# Patient Record
Sex: MALE | Race: WHITE | NOT HISPANIC OR LATINO | Employment: UNEMPLOYED | URBAN - METROPOLITAN AREA
[De-identification: names, ages, dates, MRNs, and addresses within clinical notes are randomized per-mention and may not be internally consistent; named-entity substitution may affect disease eponyms.]

---

## 2017-02-07 ENCOUNTER — OFFICE VISIT (OUTPATIENT)
Dept: FAMILY MEDICINE | Facility: CLINIC | Age: 32
End: 2017-02-07
Payer: COMMERCIAL

## 2017-02-07 VITALS
BODY MASS INDEX: 26.65 KG/M2 | DIASTOLIC BLOOD PRESSURE: 76 MMHG | WEIGHT: 191 LBS | HEART RATE: 68 BPM | SYSTOLIC BLOOD PRESSURE: 143 MMHG

## 2017-02-07 DIAGNOSIS — R25.2 BILATERAL LEG CRAMPS: ICD-10-CM

## 2017-02-07 DIAGNOSIS — G47.62 NOCTURNAL LEG CRAMPS: ICD-10-CM

## 2017-02-07 DIAGNOSIS — G47.00 PERSISTENT DISORDER OF INITIATING OR MAINTAINING SLEEP: Primary | ICD-10-CM

## 2017-02-07 PROCEDURE — 99213 OFFICE O/P EST LOW 20 MIN: CPT | Performed by: FAMILY MEDICINE

## 2017-02-07 RX ORDER — GABAPENTIN 600 MG/1
600 TABLET ORAL
Qty: 90 TABLET | Refills: 1 | Status: SHIPPED | OUTPATIENT
Start: 2017-02-07 | End: 2019-04-08

## 2017-02-07 RX ORDER — TRAZODONE HYDROCHLORIDE 100 MG/1
50-100 TABLET ORAL
Qty: 90 TABLET | Refills: 1 | Status: SHIPPED | OUTPATIENT
Start: 2017-02-07 | End: 2019-04-08

## 2017-02-07 NOTE — PROGRESS NOTES
SUBJECTIVE:                                                    Germain Iraheta is a 31 year old male who presents to clinic today for the following health issues:    Chief Complaint   Patient presents with     Medication Request     refill gabapentin and discuss getting back on ambien for sleep.      Germain Iraheta is a 31 year old male who  Has not been seen in this clinic since August 2015, though he did have some ED visits after that. He was being treated at that time for insomnia and muscle cramps with gabapentin and Ambien.  He got into some legal trouble and was able to avoid incarceration by going through the program at [facility], where he spent some months. This program did not allow the Ambien and he was placed on trazodone, which he states is working as well or better. The gabapentin has been adjust to 600 mg at bedtime.    He has a new job now that has him sitting more, so we discussed the need for muscle stretching and activity.    Since the trazodone is working for sleep, he is not interested in seeing a sleep physician.    He denies use of any illicit substances, admits to 5 alcoholic beverages a week, quit smoking 10 years ago.    OBJECTIVE: /76 mmHg  Pulse 68  Wt 191 lb (86.637 kg)    He is pleasant, calm, gives a clear history, states he is determined not to make the same mistake that got him into trouble, has a fiancee with children and feels responsible. 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    ASSESSMENT: /PLAN:      ICD-10-CM    1. Persistent disorder of initiating or maintaining sleep G47.00 traZODone (DESYREL) 100 MG tablet   2. Bilateral leg cramps R25.2 gabapentin (NEURONTIN) 600 MG tablet   3. Nocturnal leg cramps G47.62 gabapentin (NEURONTIN) 600 MG tablet     Current meds renewed for six months.    Maryellen Carrizales md

## 2017-02-07 NOTE — MR AVS SNAPSHOT
"              After Visit Summary   2017    Germain Iraheta    MRN: [id number]           Patient Information     Date Of Birth          1985        Visit Information        Provider Department      2017 2:40 PM Maryellen Carrizales MD [facility]        Today's Diagnoses     Persistent disorder of initiating or maintaining sleep    -  1     Bilateral leg cramps         Nocturnal leg cramps            Follow-ups after your visit        Who to contact     If you have questions or need follow up information about today's clinic visit or your schedule please contact [facility] directly at [phone].  Normal or non-critical lab and imaging results will be communicated to you by Mitralign[employer], letter or phone within 4 business days after the clinic has received the results. If you do not hear from us within 7 days, please contact the clinic through Mitralignhart or phone. If you have a critical or abnormal lab result, we will notify you by phone as soon as possible.  Submit refill requests through [employer] or call your pharmacy and they will forward the refill request to us. Please allow 3 business days for your refill to be completed.          Additional Information About Your Visit        MyChart Information     [employer] lets you send messages to your doctor, view your test results, renew your prescriptions, schedule appointments and more. To sign up, go to [email or web address] . Click on \"Log in\" on the left side of the screen, which will take you to the Welcome page. Then click on \"Sign up Now\" on the right side of the page.     You will be asked to enter the access code listed below, as well as some personal information. Please follow the directions to create your username and password.     Your access code is: [email or web address]  Expires: 2017  3:39 PM     Your access code will  in 90 days. If you need help or a new code, please call your [facility] or " [phone].        Care EveryWhere ID     This is your Care EveryWhere ID. This could be used by other organizations to access your [facility] medical records  [id number]        Your Vitals Were     Pulse                   68            Blood Pressure from Last 3 Encounters:   02/07/17 143/76   11/15/15 140/80   10/07/15 152/98    Weight from Last 3 Encounters:   02/07/17 191 lb (86.637 kg)   11/15/15 193 lb (87.544 kg)   08/24/15 198 lb (89.812 kg)              Today, you had the following     No orders found for display         Today's Medication Changes          These changes are accurate as of: 2/7/17  3:39 PM.  If you have any questions, ask your nurse or doctor.               Start taking these medicines.        Dose/Directions    gabapentin 600 MG tablet   Commonly known as:  NEURONTIN   Used for:  Nocturnal leg cramps, Bilateral leg cramps   Replaces:  gabapentin 400 MG capsule   Started by:  Maryellen Carrizales MD        Dose:  600 mg   Take 1 tablet (600 mg) by mouth nightly as needed   Quantity:  90 tablet   Refills:  1       traZODone 100 MG tablet   Commonly known as:  DESYREL   Used for:  Persistent disorder of initiating or maintaining sleep   Started by:  Maryellen Carrizales MD        Dose:   mg   Take 0.5-1 tablets ( mg) by mouth nightly as needed for sleep   Quantity:  90 tablet   Refills:  1         Stop taking these medicines if you haven't already. Please contact your care team if you have questions.     AMBIEN 5 MG tablet   Generic drug:  zolpidem   Stopped by:  Maryellen Carrizales MD           cyclobenzaprine 10 MG tablet   Commonly known as:  FLEXERIL   Stopped by:  Maryellen Carrizales MD           gabapentin 400 MG capsule   Commonly known as:  NEURONTIN   Replaced by:  gabapentin 600 MG tablet   Stopped by:  Maryellen Carrizales MD                Where to get your medicines      These medications were sent to [facility] – [place] - [address]  [address] [facility] [address]     Phone:  [phone]    - gabapentin 600 MG tablet  - traZODone 100 MG tablet             Primary Care Provider Office Phone # Fax #    Maryellen Naty Carrizales [phone]       [facility] [address]  [facility] [address]        Thank you!     Thank you for choosing [facility]  for your care. Our goal is always to provide you with excellent care. Hearing back from our patients is one way we can continue to improve our services. Please take a few minutes to complete the written survey that you may receive in the mail after your visit with us. Thank you!             Your Updated Medication List - Protect others around you: Learn how to safely use, store and throw away your medicines at www.disposemymeds.org.          This list is accurate as of: 2/7/17  3:39 PM.  Always use your most recent med list.                   Brand Name Dispense Instructions for use    gabapentin 600 MG tablet    NEURONTIN    90 tablet    Take 1 tablet (600 mg) by mouth nightly as needed       tadalafil 20 MG tablet    CIALIS    6 tablet    Take 1 tablet by mouth daily as needed for erectile dysfunction.       traZODone 100 MG tablet    DESYREL    90 tablet    Take 0.5-1 tablets ( mg) by mouth nightly as needed for sleep

## 2017-02-07 NOTE — NURSING NOTE
"Chief Complaint   Patient presents with     Medication Request     refill gabapentin and discuss getting back on ambien for sleep.        Initial /76 mmHg  Pulse 68  Wt 191 lb (86.637 kg) Estimated body mass index is 26.65 kg/(m^2) as calculated from the following:    Height as of 11/15/15: 5' 10.98\" (1.803 m).    Weight as of this encounter: 191 lb (86.637 kg).  Medication Reconciliation: complete   Shilpi Tamez CMA    "

## 2017-06-05 ENCOUNTER — HOSPITAL ENCOUNTER (EMERGENCY)
Facility: CLINIC | Age: 32
Discharge: LEFT BEFORE DISCHARGE | End: 2017-06-05
Admitting: EMERGENCY MEDICINE
Payer: COMMERCIAL

## 2017-06-05 ENCOUNTER — HOSPITAL ENCOUNTER (EMERGENCY)
Facility: CLINIC | Age: 32
Discharge: HOME | End: 2017-06-05
Attending: PHYSICIAN ASSISTANT | Admitting: PHYSICIAN ASSISTANT
Payer: COMMERCIAL

## 2017-06-05 ENCOUNTER — APPOINTMENT (OUTPATIENT)
Dept: GENERAL RADIOLOGY | Facility: CLINIC | Age: 32
End: 2017-06-05
Attending: PHYSICIAN ASSISTANT
Payer: COMMERCIAL

## 2017-06-05 VITALS
SYSTOLIC BLOOD PRESSURE: 133 MMHG | OXYGEN SATURATION: 97 % | HEIGHT: 71 IN | BODY MASS INDEX: 28.7 KG/M2 | RESPIRATION RATE: 18 BRPM | TEMPERATURE: 98 F | HEART RATE: 68 BPM | DIASTOLIC BLOOD PRESSURE: 86 MMHG | WEIGHT: 205 LBS

## 2017-06-05 DIAGNOSIS — M79.672 PAIN OF LEFT HEEL: ICD-10-CM

## 2017-06-05 PROCEDURE — 99213 OFFICE O/P EST LOW 20 MIN: CPT | Performed by: PHYSICIAN ASSISTANT

## 2017-06-05 PROCEDURE — 99213 OFFICE O/P EST LOW 20 MIN: CPT

## 2017-06-05 PROCEDURE — 73650 X-RAY EXAM OF HEEL: CPT | Mod: LT

## 2017-06-05 PROCEDURE — 73630 X-RAY EXAM OF FOOT: CPT | Mod: LT

## 2017-06-05 RX ORDER — ROPINIROLE 2 MG/1
2 TABLET, FILM COATED, EXTENDED RELEASE ORAL AT BEDTIME
COMMUNITY
End: 2019-04-08 | Stop reason: ALTCHOICE

## 2017-06-05 NOTE — ED AVS SNAPSHOT
[facility] Emergency Department    [address] [address]    Phone:  [phone]    Fax:  [phone]                                       Germain Iraheta   MRN: [id number]    Department:  [facility] Emergency Department   Date of Visit:  6/5/2017           Patient Information     Date Of Birth          1985        Your diagnoses for this visit were:     Pain of left heel        You were seen by Aidee Lopez PA-C.      Follow-up Information     Follow up with Rufus Mckinney DPM In 1 week.    Specialty:  Podiatry    Why:  as needed if symptoms worsen    Contact information:    [facility]  [address] [address] [phone]          Follow up with Javier Moser DPM In 1 week.    Specialty:  Podiatry    Why:  As needed, If symptoms worsen    Contact information:    [facility]  [address] [facility] [address] [phone]          Discharge Instructions         Understanding Heel Pain  Your heel is the back part of your foot. A band of tissue called the plantar fascia connects the heel bone to the bones in the ball of your foot. Nerves run from the heel up the inside of your ankle and into your leg. When you feel pain in the bottom of your heel, the plantar fascia may be inflamed. Overuse, Achilles tightness, or excess body weight can cause the tissue to tear or pull away from the bone. Sometimes the inflamed plantar fascia also irritates a nerve, causing more pain.    What causes heel pain?  Wearing shoes with poor cushioning can irritate the tissue in your heel (plantar fascia). Being overweight or standing for long periods can also irritate the tissue. Running, walking, tennis, and other sports that put stress on the heels can cause tiny tears in the tissue. If your lower leg muscles are tight, this is more likely to occur. A tight Achilles tendon will also contribute to heel pain.  Symptoms  You may  feel pain on the bottom or on the inside edge of your heel. The pain may be sharp when you get out of bed or when you stand up after sitting for a while. You may feel a dull ache in your heel after you ve been standing for a long time on a hard surface. Running can also cause a dull ache.  Preventing future problems  To prevent future heel pain, wear shoes with well-cushioned heels. And do exercises prescribed by your healthcare provider to stretch the plantar fascia and the muscles in the lower leg.     0294-4258 [employer]. [address] [place] [address]. All rights reserved. This information is not intended as a substitute for professional medical care. Always follow your healthcare professional's instructions.          24 Hour Appointment Hotline       To make an appointment at any [facility]3], call [phone] ([phone]). If you don't have a family doctor or clinic, we will help you find one. [facility] clinics are conveniently located to serve the needs of you and your family.          ED Discharge Orders     Alum Crutches: Adult       Use gait belt during crutch training.                     Review of your medicines      Our records show that you are taking the medicines listed below. If these are incorrect, please call your family doctor or clinic.        Dose / Directions Last dose taken    gabapentin 600 MG tablet   Commonly known as:  NEURONTIN   Dose:  600 mg   Quantity:  90 tablet        Take 1 tablet (600 mg) by mouth nightly as needed   Refills:  1        rOPINIRole HCl 2 MG Tb24 tablet   Commonly known as:  REQUIP   Dose:  2 mg        Take 2 mg by mouth At Bedtime   Refills:  0        tadalafil 20 MG tablet   Commonly known as:  CIALIS   Dose:  20 mg   Quantity:  6 tablet        Take 1 tablet by mouth daily as needed for erectile dysfunction.   Refills:  12        traZODone 100 MG tablet   Commonly known as:  DESYREL   Dose:   mg   Quantity:  90 tablet      "   Take 0.5-1 tablets ( mg) by mouth nightly as needed for sleep   Refills:  1                Procedures and tests performed during your visit     Foot XR, G/E 3 views, left    XR Calcaneus Left G/E 2 Views      Orders Needing Specimen Collection     None      Pending Results     No orders found from 6/3/2017 to 6/6/2017.            Pending Culture Results     No orders found from 6/3/2017 to 6/6/2017.            Pending Results Instructions     If you had any lab results that were not finalized at the time of your Discharge, you can call the ED Lab Result RN at [phone]. You will be contacted by this team for any positive Lab results or changes in treatment. The nurses are available 7 days a week from 10A to 6:30P.  You can leave a message 24 hours per day and they will return your call.        Test Results From Your Hospital Stay        6/5/2017  6:21 PM      Narrative     XR FOOT LT G/E 3 VW 6/5/2017 6:08 PM    HISTORY: Pain.    COMPARISON: None.        Impression     IMPRESSION: No evidence of acute fracture or malalignment.    WILLIAM BUSH MD         6/5/2017  6:14 PM      Narrative     XR CALCANEUS LT G/E 2 VW 6/5/2017 6:08 PM    HISTORY: Pain.    COMPARISON: None.        Impression     IMPRESSION: No evidence of acute fracture.    WILLIAM BUSH MD                Thank you for choosing [facility]       Thank you for choosing [facility] for your care. Our goal is always to provide you with excellent care. Hearing back from our patients is one way we can continue to improve our services. Please take a few minutes to complete the written survey that you may receive in the mail after you visit with us. Thank you!        [employer] Information     [employer] lets you send messages to your doctor, view your test results, renew your prescriptions, schedule appointments and more. To sign up, go to [email or web address] . Click on \"Log in\" on the left side of the screen, which will take you to the Welcome page. " "Then click on \"Sign up Now\" on the right side of the page.     You will be asked to enter the access code listed below, as well as some personal information. Please follow the directions to create your username and password.     Your access code is: [email or web address]  Expires: 9/3/2017  6:28 PM     Your access code will  in 90 days. If you need help or a new code, please call your [facility] clinic or [phone].        Care EveryWhere ID     This is your Care EveryWhere ID. This could be used by other organizations to access your [facility] medical records  [id number]        After Visit Summary       This is your record. Keep this with you and show to your community pharmacist(s) and doctor(s) at your next visit.                  "

## 2017-06-05 NOTE — DISCHARGE INSTRUCTIONS
Understanding Heel Pain  Your heel is the back part of your foot. A band of tissue called the plantar fascia connects the heel bone to the bones in the ball of your foot. Nerves run from the heel up the inside of your ankle and into your leg. When you feel pain in the bottom of your heel, the plantar fascia may be inflamed. Overuse, Achilles tightness, or excess body weight can cause the tissue to tear or pull away from the bone. Sometimes the inflamed plantar fascia also irritates a nerve, causing more pain.    What causes heel pain?  Wearing shoes with poor cushioning can irritate the tissue in your heel (plantar fascia). Being overweight or standing for long periods can also irritate the tissue. Running, walking, tennis, and other sports that put stress on the heels can cause tiny tears in the tissue. If your lower leg muscles are tight, this is more likely to occur. A tight Achilles tendon will also contribute to heel pain.  Symptoms  You may feel pain on the bottom or on the inside edge of your heel. The pain may be sharp when you get out of bed or when you stand up after sitting for a while. You may feel a dull ache in your heel after you ve been standing for a long time on a hard surface. Running can also cause a dull ache.  Preventing future problems  To prevent future heel pain, wear shoes with well-cushioned heels. And do exercises prescribed by your healthcare provider to stretch the plantar fascia and the muscles in the lower leg.     4589-5335 [employer]. [address] [place] [address], [place] [address]. All rights reserved. This information is not intended as a substitute for professional medical care. Always follow your healthcare professional's instructions.

## 2017-06-05 NOTE — ED PROVIDER NOTES
History     Chief Complaint   Patient presents with     Ankle Pain     pt jumping from rock to rock while swimming yesterday and landed on L ankle.  c/o pain     HPI  Germain Iraheta is a 31 year old male who resents to the urgent care with concerns over left heel pain after sustaining injury yesterday.  Patient states that he was playing at [employer] and jumped from rock to rock.  He states that he jumped from a height of the 4-5 feet landing directly on his left heel.  He had acute onset of pain.  He additionally complains of ecchymosis, swelling.  He denies any distal numbness or paresthesias.  He states he has been unable to bear weight on the heel due to discomfort.  He has attempted to treat with ice and ibuprofen without relief.  He denies any any history of frequent recurrent heel pain previously.      History reviewed. No pertinent past medical history.  Current Outpatient Prescriptions   Medication Sig Dispense Refill     gabapentin (NEURONTIN) 600 MG tablet Take 1 tablet (600 mg) by mouth nightly as needed 90 tablet 1     rOPINIRole HCl (REQUIP) 2 MG TB24 tablet Take 2 mg by mouth At Bedtime       traZODone (DESYREL) 100 MG tablet Take 0.5-1 tablets ( mg) by mouth nightly as needed for sleep 90 tablet 1     tadalafil (CIALIS) 20 MG tablet Take 1 tablet by mouth daily as needed for erectile dysfunction. 6 tablet 12     Social History   Substance Use Topics     Smoking status: Former Smoker     Smokeless tobacco: Never Used      Comment: quit 2007     Alcohol use 0.0 oz/week     0 Standard drinks or equivalent per week      Comment: 5 drinks a week     I have reviewed the Medications, Allergies, Past Medical and Surgical History, and Social History in the Epic system.    Review of Systems  CONSTITUTIONAL:NEGATIVE for fever, chills, change in weight  INTEGUMENTARY/SKIN: POSITIVE for ecchymosis NEGATIVE for lacerations, abrasions or rashes   RESP:NEGATIVE for significant cough or  "SOB  MUSCULOSKELETAL: POSITIVE  for left heel pain and swelling and NEGATIVE for other significant arthralgias or myalgias   NEURO: NEGATIVE for numbness or paresthesias   Physical Exam   /86  Pulse 68  Temp 98  F (36.7  C) (Temporal)  Resp 18  Ht 1.803 m (5' 11\")  Wt 93 kg (205 lb)  SpO2 97%  BMI 28.59 kg/m2  Physical Exam   Constitutional: He is oriented to person, place, and time. He appears well-developed and well-nourished. He appears distressed (mild patient appars in pain).   Cardiovascular:   Pulses:       Dorsalis pedis pulses are 2+ on the left side.        Posterior tibial pulses are 2+ on the left side.   Musculoskeletal:        Left ankle: He exhibits decreased range of motion (actively with dorsiflexion due to pain). He exhibits no swelling and no ecchymosis. No tenderness. Achilles tendon exhibits no defect and normal Osullivan's test results.        Left lower leg: Normal.        Left foot: There is tenderness, bony tenderness (left calcaneus, posterior heel) and swelling. There is normal range of motion, normal capillary refill, no crepitus, no deformity and no laceration.        Feet:    Neurological: He is alert and oriented to person, place, and time. No sensory deficit.   Skin: Skin is warm and dry. Ecchymosis noted. No abrasion, no laceration and no rash noted. No erythema.       ED Course     ED Course     Procedures        Critical Care time:  none            Results for orders placed or performed during the hospital encounter of 06/05/17   Foot XR, G/E 3 views, left    Narrative    XR FOOT LT G/E 3 VW 6/5/2017 6:08 PM    HISTORY: Pain.    COMPARISON: None.      Impression    IMPRESSION: No evidence of acute fracture or malalignment.    WILLIAM BUSH MD   XR Calcaneus Left G/E 2 Views    Narrative    XR CALCANEUS LT G/E 2 VW 6/5/2017 6:08 PM    HISTORY: Pain.    COMPARISON: None.      Impression    IMPRESSION: No evidence of acute fracture.    WILLIAM BUSH MD     Labs Ordered and " Resulted from Time of ED Arrival Up to the Time of Departure from the ED - No data to display    Assessments & Plan (with Medical Decision Making)     I have reviewed the nursing notes.    I have reviewed the findings, diagnosis, plan and need for follow up with the patient.  Discharge Medication List as of 6/5/2017  6:28 PM        Final diagnoses:   Pain of left heel     31-year-old male presents to urgent care with concerns of her left heel pain after injury yesterday when he jumped from a height of 3-5 feet landing directly on his left heel with immediate onsets of pain.  He had stable vital signs upon arrival.  Physical exam findings as described above. He did not have any defect of his achilles tendon. His distal neurovascular status was intact.  As part of evaluation patient had x-ray of his left foot and calcaneus which are negative for acute fracture, dislocation.  Despite negative x-rays, I did recommend splinting for comfort given patient's mechanism of injury and physical exam findings.  He declines, however he did agree to use crutches for the next several days.  He is instructed to follow up with podiatry if no improvement of pain for the next 5-7 days.  Consider repeat imaging at that time.  Worrisome reasons to return to ER/UC sooner discussed.      Disclaimer: This note consists of symbols derived from keyboarding, dictation, and/or voice recognition software. As a result, there may be errors in the script that have gone undetected.  Please consider this when interpreting information found in the chart.     6/5/2017   [facility] EMERGENCY DEPARTMENT     Aidee Lopez PA-C  06/06/17 1246

## 2017-06-05 NOTE — ED AVS SNAPSHOT
[facility] Emergency Department    [address] [address]    Phone:  [phone]    Fax:  [phone]                                       Germain Iraheta   MRN: [id number]    Department:  [facility] Emergency Department   Date of Visit:  6/5/2017           After Visit Summary Signature Page     I have received my discharge instructions, and my questions have been answered. I have discussed any challenges I see with this plan with the nurse or doctor.    ..........................................................................................................................................  Patient/Patient Representative Signature      ..........................................................................................................................................  Patient Representative Print Name and Relationship to Patient    ..................................................               ................................................  Date                                            Time    ..........................................................................................................................................  Reviewed by Signature/Title    ...................................................              ..............................................  Date                                                            Time

## 2017-12-18 ENCOUNTER — TELEPHONE (OUTPATIENT)
Dept: FAMILY MEDICINE | Facility: CLINIC | Age: 32
End: 2017-12-18

## 2017-12-18 NOTE — TELEPHONE ENCOUNTER
Pt with bad cough which is causing back pain,pulled muscle every time he coughs.  Hard for pt to talk with out coughing.  Deep breath is painful.  No clinic appts.  Recommended Urgent Care and pt agreed.Manuel

## 2017-12-18 NOTE — TELEPHONE ENCOUNTER
Reason for call:  Patient reporting a symptom    Symptom or request: Pt has had a bad cough and congestion X 2 weeks and he now has back pain from coughing so hard.  Pt wants to know if can get in today for an appt.      Duration (how long have symptoms been present): 2 weeks    Have you been treated for this before? No    Additional comments:     Phone Number patient can be reached at:  Cell number on file:    Telephone Information:   Mobile [phone]       Best Time:  any    Can we leave a detailed message on this number:  YES    Call taken on 12/18/2017 at 11:04 AM by Kaitlynn Langford

## 2018-01-06 ENCOUNTER — HOSPITAL ENCOUNTER (EMERGENCY)
Facility: CLINIC | Age: 33
Discharge: HOME | End: 2018-01-06
Attending: PHYSICIAN ASSISTANT | Admitting: PHYSICIAN ASSISTANT

## 2018-01-06 VITALS
WEIGHT: 215 LBS | TEMPERATURE: 97.8 F | HEART RATE: 77 BPM | RESPIRATION RATE: 16 BRPM | BODY MASS INDEX: 29.99 KG/M2 | SYSTOLIC BLOOD PRESSURE: 136 MMHG | DIASTOLIC BLOOD PRESSURE: 88 MMHG | OXYGEN SATURATION: 99 %

## 2018-01-06 DIAGNOSIS — R07.0 THROAT PAIN: ICD-10-CM

## 2018-01-06 LAB
INTERNAL QC OK POCT: YES
S PYO AG THROAT QL IA.RAPID: NEGATIVE

## 2018-01-06 PROCEDURE — 87081 CULTURE SCREEN ONLY: CPT | Performed by: PHYSICIAN ASSISTANT

## 2018-01-06 PROCEDURE — 99213 OFFICE O/P EST LOW 20 MIN: CPT | Performed by: PHYSICIAN ASSISTANT

## 2018-01-06 PROCEDURE — G0463 HOSPITAL OUTPT CLINIC VISIT: HCPCS

## 2018-01-06 PROCEDURE — 87880 STREP A ASSAY W/OPTIC: CPT | Performed by: PHYSICIAN ASSISTANT

## 2018-01-06 RX ORDER — DEXAMETHASONE 4 MG/1
8 TABLET ORAL ONCE
Qty: 2 TABLET | Refills: 0 | Status: SHIPPED | OUTPATIENT
Start: 2018-01-06 | End: 2019-07-03

## 2018-01-06 NOTE — DISCHARGE INSTRUCTIONS
Use Medication as directed; no antibiotics indicated at this time; will wait for throat culture.     Patient advised to call for any lab results (if obtained during visit) within 2-3 days.     Symptomatic treatment with fluids, rest, salt water gargles, and cool humidifier.  May use acetaminophen, ibuprofen prn.    Return to care if any worsening symptoms or if not improving (Kinney may need to be ruled out if symptoms fail to improve).    Patient to go to Emergency Room if drooling, change in voice, difficulty swallowing or talking, or persistent fevers occur.      Patient voiced understanding of instructions given.

## 2018-01-06 NOTE — ED PROVIDER NOTES
History     Chief Complaint   Patient presents with     Pharyngitis     HPI    Germain Iraheta  is a 32 year old male who is here today because of: Sore Throat for the past 3 days. URI symptoms on and off for the past month.   The patient has had symptoms of cough, sore throat, nasal congestion/runny nose and headache.   Onset of symptoms was 3 days ago. Course of illness is same.  Patient denies exposure to illness at home or work/school.   Patient denies fever, earache, vomiting and diarrhea  Treatment measures tried include ibuprofen.    Problem list, Medication list, Allergies, and Medical/Social/Surgical histories reviewed in [facility] and updated as appropriate.    Problem List:    Patient Active Problem List    Diagnosis Date Noted     Impotence of organic origin 06/05/2013     Priority: Medium     CARDIOVASCULAR SCREENING; LDL GOAL LESS THAN 160 06/04/2013     Priority: Medium        Past Medical History:    No past medical history on file.    Past Surgical History:    No past surgical history on file.    Family History:    Family History   Problem Relation Age of Onset     DIABETES Father      CANCER Maternal Grandfather      lung     Hypertension No family hx of        Social History:  Marital Status:  Single [1]  Social History   Substance Use Topics     Smoking status: Former Smoker     Smokeless tobacco: Never Used      Comment: quit 2007     Alcohol use 0.0 oz/week     0 Standard drinks or equivalent per week      Comment: 5 drinks a week        Medications:      dexamethasone (DECADRON) 4 MG tablet   rOPINIRole HCl (REQUIP) 2 MG TB24 tablet   traZODone (DESYREL) 100 MG tablet   gabapentin (NEURONTIN) 600 MG tablet   tadalafil (CIALIS) 20 MG tablet         Review of Systems    Physical Exam   BP: 136/88  Pulse: 77  Temp: 97.8  F (36.6  C)  Resp: 16  Weight: 97.5 kg (215 lb)  SpO2: 99 %      Physical Exam     /88  Pulse 77  Temp 97.8  F (36.6  C) (Temporal)  Resp 16  Wt 97.5 kg (215 lb)  SpO2  99%  BMI 29.99 kg/m2  General: healthy, alert with no acute distress, and non toxic in appearance  Eyes - conjunctivae clear.  Ears - External ears normal. Canals clear. TM's normal.  Nose/Sinuses - Nares normal.Mucosa normal. No drainage or sinus tenderness.  Oropharynx - Lips, mucosa, and tongue normal. Positive findings: oropharyngeal erythema, tonsillar hypertrophy with no exudates present.   Neck - Neck supple; Positive findings: moderate anterior cervical nodes, no meningeal signs.   Lungs - Lungs clear; no wheezing or rales.  Heart - regular rate and rhythm. No murmurs, rub.  Abdomen: Abdomen soft, non-tender. BS normal. No masses, organomegaly  SKIN: no suspicious lesions or rashes    Labs:  Rapid Strep test is negative; await throat culture results.  Results for orders placed or performed during the hospital encounter of 01/06/18 (from the past 24 hour(s))   Rapid strep group A screen POCT   Result Value Ref Range    Rapid Strep A Screen NEGATIVE neg    Internal QC OK Yes        ED Course     ED Course     Procedures              Critical Care time:  none               Labs Ordered and Resulted from Time of ED Arrival Up to the Time of Departure from the ED   RAPID STREP GROUP A SCREEN POCT   RAPID STREP GROUP A SCREEN POCT   BETA STREP GROUP A CULTURE       Assessments & Plan (with Medical Decision Making)     I have reviewed the nursing notes.    I have reviewed the findings, diagnosis, plan and need for follow up with the patient.    Throat culture pending.        New Prescriptions    DEXAMETHASONE (DECADRON) 4 MG TABLET    Take 2 tablets (8 mg) by mouth once for 1 dose       Final diagnoses:   Throat pain       1/6/2018   [facility] EMERGENCY DEPARTMENT     Emi Villalobos PA-C  01/06/18 [id number]

## 2018-01-06 NOTE — ED AVS SNAPSHOT
[facility] Emergency Department    [address] [address]    Phone:  [phone]    Fax:  [phone]                                       Germain Iraheta   MRN: [id number]    Department:  [facility] Emergency Department   Date of Visit:  1/6/2018           After Visit Summary Signature Page     I have received my discharge instructions, and my questions have been answered. I have discussed any challenges I see with this plan with the nurse or doctor.    ..........................................................................................................................................  Patient/Patient Representative Signature      ..........................................................................................................................................  Patient Representative Print Name and Relationship to Patient    ..................................................               ................................................  Date                                            Time    ..........................................................................................................................................  Reviewed by Signature/Title    ...................................................              ..............................................  Date                                                            Time

## 2018-01-06 NOTE — ED AVS SNAPSHOT
[facility] Emergency Department    [address] [address]    Phone:  [phone]    Fax:  [phone]                                       Germain Iraheta   MRN: [id number]    Department:  [facility] Emergency Department   Date of Visit:  1/6/2018           Patient Information     Date Of Birth          1985        Your diagnoses for this visit were:     Throat pain        You were seen by Emi Villalobos PA-C.      Follow-up Information     Follow up with Maryellen Carrizales MD.    Specialty:  Family Practice    Why:  As needed, If symptoms worsen        Discharge Instructions       Use Medication as directed; no antibiotics indicated at this time; will wait for throat culture.     Patient advised to call for any lab results (if obtained during visit) within 2-3 days.     Symptomatic treatment with fluids, rest, salt water gargles, and cool humidifier.  May use acetaminophen, ibuprofen prn.    Return to care if any worsening symptoms or if not improving ([place] may need to be ruled out if symptoms fail to improve).    Patient to go to Emergency Room if drooling, change in voice, difficulty swallowing or talking, or persistent fevers occur.      Patient voiced understanding of instructions given.            24 Hour Appointment Hotline       To make an appointment at any [facility], call [phone] ([phone]). If you don't have a family doctor or clinic, we will help you find one. [facility] clinics are conveniently located to serve the needs of you and your family.             Review of your medicines      START taking        Dose / Directions Last dose taken    dexamethasone 4 MG tablet   Commonly known as:  DECADRON   Dose:  8 mg   Quantity:  2 tablet        Take 2 tablets (8 mg) by mouth once for 1 dose   Refills:  0          Our records show that you are taking the medicines listed below. If these are incorrect, please call your family doctor or clinic.         Dose / Directions Last dose taken    gabapentin 600 MG tablet   Commonly known as:  NEURONTIN   Dose:  600 mg   Quantity:  90 tablet        Take 1 tablet (600 mg) by mouth nightly as needed   Refills:  1        rOPINIRole HCl 2 MG Tb24 tablet   Commonly known as:  REQUIP   Dose:  2 mg        Take 2 mg by mouth At Bedtime   Refills:  0        tadalafil 20 MG tablet   Commonly known as:  CIALIS   Dose:  20 mg   Quantity:  6 tablet        Take 1 tablet by mouth daily as needed for erectile dysfunction.   Refills:  12        traZODone 100 MG tablet   Commonly known as:  DESYREL   Dose:   mg   Quantity:  90 tablet        Take 0.5-1 tablets ( mg) by mouth nightly as needed for sleep   Refills:  1                Prescriptions were sent or printed at these locations (1 Prescription)                   [facility] [address] [place] [address]   [address] [address]    Telephone:  [phone]   Fax:  [phone]   Hours:                  E-Prescribed (1 of 1)         dexamethasone (DECADRON) 4 MG tablet                Procedures and tests performed during your visit     Beta strep group A r/o culture    Rapid strep group A screen POCT      Orders Needing Specimen Collection     None      Pending Results     No orders found from 1/4/2018 to 1/7/2018.            Pending Culture Results     No orders found from 1/4/2018 to 1/7/2018.            Pending Results Instructions     If you had any lab results that were not finalized at the time of your Discharge, you can call the ED Lab Result RN at [phone]. You will be contacted by this team for any positive Lab results or changes in treatment. The nurses are available 7 days a week from 10A to 6:30P.  You can leave a message 24 hours per day and they will return your call.        Test Results From Your Hospital Stay        1/6/2018  5:22 PM      Component Results     Component Value Ref Range & Units Status    Rapid Strep A  "Screen NEGATIVE neg Final    Internal QC OK Yes  Final                Thank you for choosing [facility]       Thank you for choosing [facility] for your care. Our goal is always to provide you with excellent care. Hearing back from our patients is one way we can continue to improve our services. Please take a few minutes to complete the written survey that you may receive in the mail after you visit with us. Thank you!        [employer] Information     [email or web address] lets you send messages to your doctor, view your test results, renew your prescriptions, schedule appointments and more. To sign up, go to [email or web address] . Click on \"Log in\" on the left side of the screen, which will take you to the Welcome page. Then click on \"Sign up Now\" on the right side of the page.     You will be asked to enter the access code listed below, as well as some personal information. Please follow the directions to create your username and password.     Your access code is: [email or web address]  Expires: 2018  5:31 PM     Your access code will  in 90 days. If you need help or a new code, please call your [facility] clinic or [phone].        Care EveryWhere ID     This is your Care EveryWhere ID. This could be used by other organizations to access your [facility] medical records  [id number]        Equal Access to Services     JOSE MARIA VALDEZ : Khadra Martini, waaxda luqadaha, qaybta kaalmada adesepidehyada, deborah hernandez. So [facility] [phone].    ATENCIÓN: Si habla español, tiene a berger disposición servicios gratuitos de asistencia lingüística. Llame al [phone].    We comply with applicable federal civil rights laws and [place] laws. We do not discriminate on the basis of race, color, national origin, age, disability, sex, sexual orientation, or gender identity.            After Visit Summary       This is your record. Keep this with you and show to your community pharmacist(s) and doctor(s) at " your next visit.

## 2018-01-08 LAB
BACTERIA SPEC CULT: NORMAL
Lab: NORMAL
SPECIMEN SOURCE: NORMAL

## 2018-12-03 ENCOUNTER — OFFICE VISIT (OUTPATIENT)
Dept: FAMILY MEDICINE | Facility: CLINIC | Age: 33
End: 2018-12-03

## 2018-12-03 VITALS
HEIGHT: 71 IN | HEART RATE: 70 BPM | TEMPERATURE: 97.4 F | WEIGHT: 215 LBS | SYSTOLIC BLOOD PRESSURE: 134 MMHG | DIASTOLIC BLOOD PRESSURE: 82 MMHG | RESPIRATION RATE: 16 BRPM | OXYGEN SATURATION: 96 % | BODY MASS INDEX: 30.1 KG/M2

## 2018-12-03 DIAGNOSIS — M62.830 SPASM OF MUSCLE OF LOWER BACK: Primary | ICD-10-CM

## 2018-12-03 DIAGNOSIS — S39.012A STRAIN OF LUMBAR REGION, INITIAL ENCOUNTER: ICD-10-CM

## 2018-12-03 PROCEDURE — 99214 OFFICE O/P EST MOD 30 MIN: CPT | Performed by: FAMILY MEDICINE

## 2018-12-03 RX ORDER — CYCLOBENZAPRINE HCL 10 MG
10 TABLET ORAL 3 TIMES DAILY PRN
Qty: 90 TABLET | Refills: 1 | Status: SHIPPED | OUTPATIENT
Start: 2018-12-03 | End: 2019-04-08

## 2018-12-03 RX ORDER — ASPIRIN 81 MG
TABLET,CHEWABLE ORAL 4 TIMES DAILY
Qty: 30 G | Refills: 11 | Status: SHIPPED | OUTPATIENT
Start: 2018-12-03 | End: 2019-04-08

## 2018-12-03 NOTE — NURSING NOTE
"Initial /82  Pulse 70  Temp 97.4  F (36.3  C) (Tympanic)  Resp 16  Ht 5' 11\" (1.803 m)  Wt 215 lb (97.5 kg)  SpO2 96%  BMI 29.99 kg/m2 Estimated body mass index is 29.99 kg/(m^2) as calculated from the following:    Height as of this encounter: 5' 11\" (1.803 m).    Weight as of this encounter: 215 lb (97.5 kg). .      "

## 2018-12-03 NOTE — PROGRESS NOTES
"  SUBJECTIVE:   Germain Iraheta is a 32 year old male who presents to clinic today for the following health issues:    Back Pain       Duration: 4 days        Specific cause: work-related    Description:   Location of pain: low back bilateral  Character of pain: sharp, dull ache, stabbing and constant  Pain radiation:radiates into the right buttocks and radiates into the left buttocks  New numbness or weakness in legs, not attributed to pain:  no     Intensity: Currently 10/10, moderate, severe    History:   Pain interferes with job: YES  History of back problems: no prior back problems  Any previous MRI or X-rays: None  Sees a specialist for back pain:  No  Therapies tried without relief: none    Alleviating factors:   Improved by: unknown      Precipitating factors:  Worsened by: Lifting, Bending, Standing, Sitting, Lying Flat, Walking and Coughing      Problem list and histories reviewed & adjusted, as indicated.  Additional history: There was no 1 specific incident that he can recall that triggered the pain.  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  There is no radiation down into his legs and he does not notice any weakness.  He has never had any previous significant episodes of back pain        Reviewed and updated as needed this visit by clinical staff  Tobacco  Allergies  Meds  Med Hx  Surg Hx  Fam Hx  Soc Hx      Reviewed and updated as needed this visit by Provider                 ROS:  Constitutional, HEENT, cardiovascular, pulmonary, gi and gu systems are negative, except as otherwise noted.        OBJECTIVE:                                                    /82  Pulse 70  Temp 97.4  F (36.3  C) (Tympanic)  Resp 16  Ht 5' 11\" (1.803 m)  Wt 215 lb (97.5 kg)  SpO2 96%  BMI 29.99 kg/m2    GENERAL: healthy, alert and no " distress  EYES: Eyes grossly normal to inspection, extraocular movements - intact, and PERRL  MS: Back exam: Normal appearance and posture, able to move about the exam room without too much difficulty; range of motion three quarters normal in all directions, with pain at the extremes; straight leg raising negative bilaterally  SKIN: no suspicious lesions, no rashes  NEURO: strength and tone- normal, sensory exam- grossly normal, mentation- intact,  reflexes-both lower extremities normal and symmetric         ASSESSMENT/PLAN:                                                    ASSESSMENT:  1. Spasm of muscle of lower back    2. Strain of lumbar region, initial encounter    Discussed pathophysiology of this condition and implications.  Questions answered.  He wondered about an x-ray but I told him with his description it is not likely to give us any additional information    PLAN:  Orders Placed This Encounter     cyclobenzaprine (FLEXERIL) 10 MG tablet     Capsaicin 0.1 % cream   Discussed how to take the medication(s), expected outcomes, potential side effects..    His employer does not have any work scheduled for the next 2 weeks, so hopefully with the above measures and with resting and doing exercises below this will settle down.  If he is not able to return to work when the employer calls him back will let us know and we can give him a note.  Stressed the importance of avoiding twisting and bending with lifting or pulling    Patient Instructions        Low Back Pain        What is low back pain?   Low back pain is pain and stiffness in the lower back. It is one of the most common reasons people miss work.   How does it occur?   Your lower back is called your lumbar spine. It is made up of 5 bones called lumbar vertebrae. In between the vertebrae are shock absorbers called disks. Back pain can occur from an injury to the vertebrae or when a disk bulges or herniates.   Low back pain is usually caused when a  ligament or muscle holding a vertebra in its proper position is strained. Vertebrae are bones that make up the spinal column through which the spinal cord passes. When these muscles or ligaments become weak or strained, the spine loses its stability, resulting in pain.   Low back pain can occur if your job involves lifting and carrying heavy objects, or if you spend a lot of time sitting or standing in one position or bending over. It can be caused by a fall or by unusually strenuous exercise. It can be brought on by the tension and stress that cause headaches in some people. It can even be brought on by violent sneezing or coughing.   People who are overweight may have low back pain because of the added stress on their back.   Back pain may occur when the muscles, joints, bones, and connective tissues of the back become inflamed as a result of an infection or an immune system problem. Arthritic disorders as well as some congenital and degenerative conditions may cause back pain.   Back pain accompanied by loss of bladder or bowel control, trouble moving your legs, or numbness or tingling in your arms or legs requires immediate medical treatment.   What are the symptoms?   Symptoms include:   pain in the back or legs   stiffness, spasm, or limited motion   The pain may be constant or may happen only in certain positions. It may get worse when you cough, sneeze, bend, twist, or strain during a bowel movement. The pain may be in only one spot or may spread to other areas, most commonly down the buttocks and into the back of the thigh.   A low back strain typically does not produce pain past the knee into the calf or foot. Tingling or numbness in the calf or foot may indicate a herniated disk or pinched nerve.   Be sure to see your healthcare provider if:   You have weakness in your leg, especially if you cannot lift your foot, because this may be a sign of nerve damage.   You have new bowel or bladder problems as well  as back pain, which may be a sign of severe injury to your spinal cord.   You have pain that gets worse despite treatment.   How is it diagnosed?   Your healthcare provider will review your medical history and examine you. You may have X-rays, an MRI, CT scan, or a bone scan.   How is it treated?   To treat this condition:   Put an ice pack, gel pack, or package of frozen vegetables, wrapped in a cloth on the area every 3 to 4 hours, for up to 20 minutes at a time for the first 2 or 3 days.   Use a heating pad or hot water bottle. Don't let the heating pad get too hot, and don't fall asleep with it. You could get a burn.   Rest in bed on a firm mattress. Often it helps to lie on your back with your knees raised on a pillow. However, some people prefer to lie on their side with their knees bent. It's best to try to stay active, so try not to rest in bed longer than 1 to 2 days.   Take muscle relaxants as recommended by your healthcare provider.   Take an anti-inflammatory such as ibuprofen, or other medicine as directed by your provider. Nonsteroidal anti-inflammatory medicines (NSAIDs) may cause stomach bleeding and other problems. These risks increase with age. Read the label and take as directed. Unless recommended by your healthcare provider, do not take for more than 10 days.   Get a back massage by a trained person.   Wear a belt or corset to support your back.   Do the exercises recommended by your provider. Your provider may also prescribe physical therapy.   Talk with a counselor, if your back pain is related to tension caused by emotional problems.   When the pain is gone, ask your healthcare provider about starting an exercise program such as the following:   Exercise moderately every day, using stretching and warm-up exercises suggested by your provider or physical therapist.   Exercise vigorously for about 30 minutes 3 times a week by walking, swimming, using a stationary bicycle, or doing low-impact  aerobics.   Exercising regularly will not only help your back, it will also help keep you healthier overall.   How long will the effects last?   The effects of back pain last as long as the cause exists or until your body recovers from the strain, usually a day or two but sometimes weeks.   How can I take care of myself?   In addition to the treatment described above, keep in mind these suggestions:   Practice good posture. Stand with your head up, shoulders straight, chest forward, weight balanced evenly on both feet, and pelvis tucked in.   Lose weight if you are overweight   Keep your core muscles strong. These are your abdominal and back muscles.   Sleep without a pillow under your head.   Pain is the best way to  the pace you should set in increasing your activity and exercise. Minor discomfort, stiffness, soreness, and mild aches need not interfere with activity. However, limit your activities temporarily if:   Your symptoms return.   The pain increases when you are more active.   The pain increases within 24 hours after a new or higher level of activity.   When can I return to my normal activities?   Everyone recovers from an injury at a different rate. Return to your activities depends on how soon your back recovers, not by how many days or weeks it has been since your injury has occurred. In general, the longer you have symptoms before you start treatment, the longer it will take to get better. The goal is to return to your normal activities as soon as is safely possible. If you return too soon you may worsen your injury.   It is important that you have fully recovered from your low back pain before you return to any strenuous activity. You must be able to have the same range of motion that you had before your injury. You must be able to walk and twist without pain.   What can I do to help prevent low back pain?   You can reduce the strain on your back by doing the following:   Don't push with your  arms when you move a heavy object. Turn around and push backwards so the strain is taken by your legs.   Whenever you sit, sit in a straight-backed chair and hold your spine against the back of the chair.   Bend your knees and hips and keep your back straight when you lift a heavy object.   Avoid lifting heavy objects higher than your waist.   Hold packages you carry close to your body, with your arms bent.   Use a footrest for one foot when you stand or sit in one spot for a long time. This keeps your back straight.   Bend your knees when you bend over.   Sit close to the pedals when you drive and use your seat belt and a hard backrest or pillow.   Lie on your side with your knees bent when you sleep or rest. It may help to put a pillow between your knees.   Put a pillow under your knees when you sleep on your back.   Raise the foot of the bed 8 inches to discourage sleeping on your stomach unless you have other problems that require that you keep your head elevated.   To rest your back, hold each of these positions for 5?minutes or longer:   Lie on your back, bend your knees, and put pillows under your knees.   Lie on your back on the floor with a pillow under your neck. Bend your knees to a 90-degree angle, and put your lower legs and feet on a chair.   Lie on your back, bend your knees, and bring one knee up to your chest and hold it there. Repeat with the other knee, then bring both knees to your chest. When holding your knee to your chest, grab your thigh rather than your lower leg to avoid over flexing your knee.     Published by [employer].  This content is reviewed periodically and is subject to change as new health information becomes available. The information is intended to inform and educate and is not a replacement for medical evaluation, advice, diagnosis or treatment by a healthcare professional.   Developed by Carolina Conrad RN, [place], and [facility].   ? 2010 [employer] and/or its affiliates.  All Rights Reserved.           Low Back Pain Exercise      Standing hamstring stretch: Put the heel of one leg on a stool about 15 inches high. Keep your leg straight. Lean forward, bending at the hips until you feel a mild stretch in the back of your thigh. Make sure you do not roll your shoulders or bend at the waist when doing this. You want to stretch your leg, not your lower back. Hold the stretch for 15 to 30 seconds. Repeat with each leg 3 times.   Cat and camel: Get down on your hands and knees. Let your stomach sag, allowing your back to curve downward. Hold this position for 5 seconds. Then arch your back and hold for 5 seconds. Do 3 sets of 10.   Quadruped arm and leg raise: Get down on your hands and knees. Pull in your belly button and tighten your abdominal muscles to stiffen your spine. While keeping your abdominals tight, raise one arm and the opposite leg away from you. Hold this position for 5 seconds. Lower your arm and leg slowly and change sides. Do this 10 times on each side.   Pelvic tilt: Lie on your back with your knees bent and your feet flat on the floor. Tighten your abdominal muscles and push your lower back into the floor. Hold this position for 5 seconds, then relax. Do 3 sets of 10.   Partial curl: Lie on your back with your knees bent and your feet flat on the floor. Tighten your stomach muscles. Tuck your chin to your chest. With your hands stretched out in front of you, curl your upper body forward until your shoulders clear the floor. Hold this position for 3 seconds. Don't hold your breath. It helps to breathe out as you lift your shoulders up. Relax back to the floor. Repeat 10 times. Build to 3 sets of 10. To challenge yourself, clasp your hands behind your head and keep your elbows out to the side.   Gluteal stretch: Lie on your back with both knees bent. Rest the ankle of one leg over the knee of your other leg. Grasp the thigh of the bottom leg and pull toward your chest.  You will feel a stretch along the buttocks and possibly along the outside of your hip. Hold the stretch for 15 to 30 seconds. Repeat 3 times with each leg.   Extension exercise:   0. Lie face down on the floor for 5 minutes. If this hurts too much, lie face down with a pillow under your stomach. This should relieve your leg or back pain. When you can lie on your stomach for 5 minutes without a pillow, you can continue with Part B of this exercise.   0. After lying on your stomach for 5 minutes, prop yourself up on your elbows for another 5 minutes. If you can do this without having more leg or buttock pain, you can start doing part C of this exercise.   0. Lie on your stomach with your hands under your shoulders. Then press down on your hands and extend your elbows while keeping your hips flat on the floor. Hold for 1 second and lower yourself to the floor. Do 3 to 5 sets of 10 repetitions. Rest for 1 minute between sets. You should have no pain in your legs when you do this, but it is normal to feel some pain in your lower back.   Do this exercise several times a day.   Side plank: Lie on your side with your legs, hips, and shoulders in a straight line. Prop yourself up onto your forearm so your elbow is directly under your shoulder. Lift your hips off the floor and balance on your forearm and the outside of your foot. Try to hold this position for 15 seconds, then slowly lower your hip to the ground. Switch sides and repeat. Work up to holding for 1 minute or longer. This exercise can be made easier by starting with your knees and hips flexed toward your chest.   Published by [employer].  This content is reviewed periodically and is subject to change as new health information becomes available. The information is intended to inform and educate and is not a replacement for medical evaluation, advice, diagnosis or treatment by a healthcare professional.   Written by Liat Cannon, MS, PT, and Carolina Gonzalez, PT,  [facility], for [employer]   ? 2010 [facility] and/or its affiliates. All Rights Reserved.         Copyright   [employer] 2011              HARSHA Kelly  [facility]

## 2018-12-03 NOTE — PATIENT INSTRUCTIONS
Low Back Pain        What is low back pain?   Low back pain is pain and stiffness in the lower back. It is one of the most common reasons people miss work.   How does it occur?   Your lower back is called your lumbar spine. It is made up of 5 bones called lumbar vertebrae. In between the vertebrae are shock absorbers called disks. Back pain can occur from an injury to the vertebrae or when a disk bulges or herniates.   Low back pain is usually caused when a ligament or muscle holding a vertebra in its proper position is strained. Vertebrae are bones that make up the spinal column through which the spinal cord passes. When these muscles or ligaments become weak or strained, the spine loses its stability, resulting in pain.   Low back pain can occur if your job involves lifting and carrying heavy objects, or if you spend a lot of time sitting or standing in one position or bending over. It can be caused by a fall or by unusually strenuous exercise. It can be brought on by the tension and stress that cause headaches in some people. It can even be brought on by violent sneezing or coughing.   People who are overweight may have low back pain because of the added stress on their back.   Back pain may occur when the muscles, joints, bones, and connective tissues of the back become inflamed as a result of an infection or an immune system problem. Arthritic disorders as well as some congenital and degenerative conditions may cause back pain.   Back pain accompanied by loss of bladder or bowel control, trouble moving your legs, or numbness or tingling in your arms or legs requires immediate medical treatment.   What are the symptoms?   Symptoms include:   pain in the back or legs   stiffness, spasm, or limited motion   The pain may be constant or may happen only in certain positions. It may get worse when you cough, sneeze, bend, twist, or strain during a bowel movement. The pain may be in only one spot or may spread to  other areas, most commonly down the buttocks and into the back of the thigh.   A low back strain typically does not produce pain past the knee into the calf or foot. Tingling or numbness in the calf or foot may indicate a herniated disk or pinched nerve.   Be sure to see your healthcare provider if:   You have weakness in your leg, especially if you cannot lift your foot, because this may be a sign of nerve damage.   You have new bowel or bladder problems as well as back pain, which may be a sign of severe injury to your spinal cord.   You have pain that gets worse despite treatment.   How is it diagnosed?   Your healthcare provider will review your medical history and examine you. You may have X-rays, an MRI, CT scan, or a bone scan.   How is it treated?   To treat this condition:   Put an ice pack, gel pack, or package of frozen vegetables, wrapped in a cloth on the area every 3 to 4 hours, for up to 20 minutes at a time for the first 2 or 3 days.   Use a heating pad or hot water bottle. Don't let the heating pad get too hot, and don't fall asleep with it. You could get a burn.   Rest in bed on a firm mattress. Often it helps to lie on your back with your knees raised on a pillow. However, some people prefer to lie on their side with their knees bent. It's best to try to stay active, so try not to rest in bed longer than 1 to 2 days.   Take muscle relaxants as recommended by your healthcare provider.   Take an anti-inflammatory such as ibuprofen, or other medicine as directed by your provider. Nonsteroidal anti-inflammatory medicines (NSAIDs) may cause stomach bleeding and other problems. These risks increase with age. Read the label and take as directed. Unless recommended by your healthcare provider, do not take for more than 10 days.   Get a back massage by a trained person.   Wear a belt or corset to support your back.   Do the exercises recommended by your provider. Your provider may also prescribe physical  therapy.   Talk with a counselor, if your back pain is related to tension caused by emotional problems.   When the pain is gone, ask your healthcare provider about starting an exercise program such as the following:   Exercise moderately every day, using stretching and warm-up exercises suggested by your provider or physical therapist.   Exercise vigorously for about 30 minutes 3 times a week by walking, swimming, using a stationary bicycle, or doing low-impact aerobics.   Exercising regularly will not only help your back, it will also help keep you healthier overall.   How long will the effects last?   The effects of back pain last as long as the cause exists or until your body recovers from the strain, usually a day or two but sometimes weeks.   How can I take care of myself?   In addition to the treatment described above, keep in mind these suggestions:   Practice good posture. Stand with your head up, shoulders straight, chest forward, weight balanced evenly on both feet, and pelvis tucked in.   Lose weight if you are overweight   Keep your core muscles strong. These are your abdominal and back muscles.   Sleep without a pillow under your head.   Pain is the best way to  the pace you should set in increasing your activity and exercise. Minor discomfort, stiffness, soreness, and mild aches need not interfere with activity. However, limit your activities temporarily if:   Your symptoms return.   The pain increases when you are more active.   The pain increases within 24 hours after a new or higher level of activity.   When can I return to my normal activities?   Everyone recovers from an injury at a different rate. Return to your activities depends on how soon your back recovers, not by how many days or weeks it has been since your injury has occurred. In general, the longer you have symptoms before you start treatment, the longer it will take to get better. The goal is to return to your normal activities as  soon as is safely possible. If you return too soon you may worsen your injury.   It is important that you have fully recovered from your low back pain before you return to any strenuous activity. You must be able to have the same range of motion that you had before your injury. You must be able to walk and twist without pain.   What can I do to help prevent low back pain?   You can reduce the strain on your back by doing the following:   Don't push with your arms when you move a heavy object. Turn around and push backwards so the strain is taken by your legs.   Whenever you sit, sit in a straight-backed chair and hold your spine against the back of the chair.   Bend your knees and hips and keep your back straight when you lift a heavy object.   Avoid lifting heavy objects higher than your waist.   Hold packages you carry close to your body, with your arms bent.   Use a footrest for one foot when you stand or sit in one spot for a long time. This keeps your back straight.   Bend your knees when you bend over.   Sit close to the pedals when you drive and use your seat belt and a hard backrest or pillow.   Lie on your side with your knees bent when you sleep or rest. It may help to put a pillow between your knees.   Put a pillow under your knees when you sleep on your back.   Raise the foot of the bed 8 inches to discourage sleeping on your stomach unless you have other problems that require that you keep your head elevated.   To rest your back, hold each of these positions for 5?minutes or longer:   Lie on your back, bend your knees, and put pillows under your knees.   Lie on your back on the floor with a pillow under your neck. Bend your knees to a 90-degree angle, and put your lower legs and feet on a chair.   Lie on your back, bend your knees, and bring one knee up to your chest and hold it there. Repeat with the other knee, then bring both knees to your chest. When holding your knee to your chest, grab your thigh  rather than your lower leg to avoid over flexing your knee.     Published by [employer].  This content is reviewed periodically and is subject to change as new health information becomes available. The information is intended to inform and educate and is not a replacement for medical evaluation, advice, diagnosis or treatment by a healthcare professional.   Developed by Carolina Conrad RN, [place], and [employer] [facility].   ? 2010 [facility] and/or its affiliates. All Rights Reserved.           Low Back Pain Exercise      Standing hamstring stretch: Put the heel of one leg on a stool about 15 inches high. Keep your leg straight. Lean forward, bending at the hips until you feel a mild stretch in the back of your thigh. Make sure you do not roll your shoulders or bend at the waist when doing this. You want to stretch your leg, not your lower back. Hold the stretch for 15 to 30 seconds. Repeat with each leg 3 times.   Cat and camel: Get down on your hands and knees. Let your stomach sag, allowing your back to curve downward. Hold this position for 5 seconds. Then arch your back and hold for 5 seconds. Do 3 sets of 10.   Quadruped arm and leg raise: Get down on your hands and knees. Pull in your belly button and tighten your abdominal muscles to stiffen your spine. While keeping your abdominals tight, raise one arm and the opposite leg away from you. Hold this position for 5 seconds. Lower your arm and leg slowly and change sides. Do this 10 times on each side.   Pelvic tilt: Lie on your back with your knees bent and your feet flat on the floor. Tighten your abdominal muscles and push your lower back into the floor. Hold this position for 5 seconds, then relax. Do 3 sets of 10.   Partial curl: Lie on your back with your knees bent and your feet flat on the floor. Tighten your stomach muscles. Tuck your chin to your chest. With your hands stretched out in front of you, curl your upper body forward until your shoulders clear the  floor. Hold this position for 3 seconds. Don't hold your breath. It helps to breathe out as you lift your shoulders up. Relax back to the floor. Repeat 10 times. Build to 3 sets of 10. To challenge yourself, clasp your hands behind your head and keep your elbows out to the side.   Gluteal stretch: Lie on your back with both knees bent. Rest the ankle of one leg over the knee of your other leg. Grasp the thigh of the bottom leg and pull toward your chest. You will feel a stretch along the buttocks and possibly along the outside of your hip. Hold the stretch for 15 to 30 seconds. Repeat 3 times with each leg.   Extension exercise:   0. Lie face down on the floor for 5 minutes. If this hurts too much, lie face down with a pillow under your stomach. This should relieve your leg or back pain. When you can lie on your stomach for 5 minutes without a pillow, you can continue with Part B of this exercise.   0. After lying on your stomach for 5 minutes, prop yourself up on your elbows for another 5 minutes. If you can do this without having more leg or buttock pain, you can start doing part C of this exercise.   0. Lie on your stomach with your hands under your shoulders. Then press down on your hands and extend your elbows while keeping your hips flat on the floor. Hold for 1 second and lower yourself to the floor. Do 3 to 5 sets of 10 repetitions. Rest for 1 minute between sets. You should have no pain in your legs when you do this, but it is normal to feel some pain in your lower back.   Do this exercise several times a day.   Side plank: Lie on your side with your legs, hips, and shoulders in a straight line. Prop yourself up onto your forearm so your elbow is directly under your shoulder. Lift your hips off the floor and balance on your forearm and the outside of your foot. Try to hold this position for 15 seconds, then slowly lower your hip to the ground. Switch sides and repeat. Work up to holding for 1 minute or  longer. This exercise can be made easier by starting with your knees and hips flexed toward your chest.   Published by [employer].  This content is reviewed periodically and is subject to change as new health information becomes available. The information is intended to inform and educate and is not a replacement for medical evaluation, advice, diagnosis or treatment by a healthcare professional.   Written by Liat Cannon, MS, PT, and Carolina Gonzalez PT, [facility], [facility], for [facility]   ? 2010 [facility] and/or its affiliates. All Rights Reserved.         Copyright   [employer] 2011

## 2018-12-03 NOTE — MR AVS SNAPSHOT
After Visit Summary   12/3/2018    Germain Iraheta    MRN: [id number]           Patient Information     Date Of Birth          1985        Visit Information        Provider Department      12/3/2018 2:20 PM Robin, HARSHA Solis MD [facility]        Today's Diagnoses     Spasm of muscle of lower back    -  1    Strain of lumbar region, initial encounter          Care Instructions         Low Back Pain        What is low back pain?   Low back pain is pain and stiffness in the lower back. It is one of the most common reasons people miss work.   How does it occur?   Your lower back is called your lumbar spine. It is made up of 5 bones called lumbar vertebrae. In between the vertebrae are shock absorbers called disks. Back pain can occur from an injury to the vertebrae or when a disk bulges or herniates.   Low back pain is usually caused when a ligament or muscle holding a vertebra in its proper position is strained. Vertebrae are bones that make up the spinal column through which the spinal cord passes. When these muscles or ligaments become weak or strained, the spine loses its stability, resulting in pain.   Low back pain can occur if your job involves lifting and carrying heavy objects, or if you spend a lot of time sitting or standing in one position or bending over. It can be caused by a fall or by unusually strenuous exercise. It can be brought on by the tension and stress that cause headaches in some people. It can even be brought on by violent sneezing or coughing.   People who are overweight may have low back pain because of the added stress on their back.   Back pain may occur when the muscles, joints, bones, and connective tissues of the back become inflamed as a result of an infection or an immune system problem. Arthritic disorders as well as some congenital and degenerative conditions may cause back pain.   Back pain accompanied by loss of bladder or bowel control, trouble  moving your legs, or numbness or tingling in your arms or legs requires immediate medical treatment.   What are the symptoms?   Symptoms include:   pain in the back or legs   stiffness, spasm, or limited motion   The pain may be constant or may happen only in certain positions. It may get worse when you cough, sneeze, bend, twist, or strain during a bowel movement. The pain may be in only one spot or may spread to other areas, most commonly down the buttocks and into the back of the thigh.   A low back strain typically does not produce pain past the knee into the calf or foot. Tingling or numbness in the calf or foot may indicate a herniated disk or pinched nerve.   Be sure to see your healthcare provider if:   You have weakness in your leg, especially if you cannot lift your foot, because this may be a sign of nerve damage.   You have new bowel or bladder problems as well as back pain, which may be a sign of severe injury to your spinal cord.   You have pain that gets worse despite treatment.   How is it diagnosed?   Your healthcare provider will review your medical history and examine you. You may have X-rays, an MRI, CT scan, or a bone scan.   How is it treated?   To treat this condition:   Put an ice pack, gel pack, or package of frozen vegetables, wrapped in a cloth on the area every 3 to 4 hours, for up to 20 minutes at a time for the first 2 or 3 days.   Use a heating pad or hot water bottle. Don't let the heating pad get too hot, and don't fall asleep with it. You could get a burn.   Rest in bed on a firm mattress. Often it helps to lie on your back with your knees raised on a pillow. However, some people prefer to lie on their side with their knees bent. It's best to try to stay active, so try not to rest in bed longer than 1 to 2 days.   Take muscle relaxants as recommended by your healthcare provider.   Take an anti-inflammatory such as ibuprofen, or other medicine as directed by your provider.  Nonsteroidal anti-inflammatory medicines (NSAIDs) may cause stomach bleeding and other problems. These risks increase with age. Read the label and take as directed. Unless recommended by your healthcare provider, do not take for more than 10 days.   Get a back massage by a trained person.   Wear a belt or corset to support your back.   Do the exercises recommended by your provider. Your provider may also prescribe physical therapy.   Talk with a counselor, if your back pain is related to tension caused by emotional problems.   When the pain is gone, ask your healthcare provider about starting an exercise program such as the following:   Exercise moderately every day, using stretching and warm-up exercises suggested by your provider or physical therapist.   Exercise vigorously for about 30 minutes 3 times a week by walking, swimming, using a stationary bicycle, or doing low-impact aerobics.   Exercising regularly will not only help your back, it will also help keep you healthier overall.   How long will the effects last?   The effects of back pain last as long as the cause exists or until your body recovers from the strain, usually a day or two but sometimes weeks.   How can I take care of myself?   In addition to the treatment described above, keep in mind these suggestions:   Practice good posture. Stand with your head up, shoulders straight, chest forward, weight balanced evenly on both feet, and pelvis tucked in.   Lose weight if you are overweight   Keep your core muscles strong. These are your abdominal and back muscles.   Sleep without a pillow under your head.   Pain is the best way to  the pace you should set in increasing your activity and exercise. Minor discomfort, stiffness, soreness, and mild aches need not interfere with activity. However, limit your activities temporarily if:   Your symptoms return.   The pain increases when you are more active.   The pain increases within 24 hours after a new or  higher level of activity.   When can I return to my normal activities?   Everyone recovers from an injury at a different rate. Return to your activities depends on how soon your back recovers, not by how many days or weeks it has been since your injury has occurred. In general, the longer you have symptoms before you start treatment, the longer it will take to get better. The goal is to return to your normal activities as soon as is safely possible. If you return too soon you may worsen your injury.   It is important that you have fully recovered from your low back pain before you return to any strenuous activity. You must be able to have the same range of motion that you had before your injury. You must be able to walk and twist without pain.   What can I do to help prevent low back pain?   You can reduce the strain on your back by doing the following:   Don't push with your arms when you move a heavy object. Turn around and push backwards so the strain is taken by your legs.   Whenever you sit, sit in a straight-backed chair and hold your spine against the back of the chair.   Bend your knees and hips and keep your back straight when you lift a heavy object.   Avoid lifting heavy objects higher than your waist.   Hold packages you carry close to your body, with your arms bent.   Use a footrest for one foot when you stand or sit in one spot for a long time. This keeps your back straight.   Bend your knees when you bend over.   Sit close to the pedals when you drive and use your seat belt and a hard backrest or pillow.   Lie on your side with your knees bent when you sleep or rest. It may help to put a pillow between your knees.   Put a pillow under your knees when you sleep on your back.   Raise the foot of the bed 8 inches to discourage sleeping on your stomach unless you have other problems that require that you keep your head elevated.   To rest your back, hold each of these positions for 5?minutes or longer:    Lie on your back, bend your knees, and put pillows under your knees.   Lie on your back on the floor with a pillow under your neck. Bend your knees to a 90-degree angle, and put your lower legs and feet on a chair.   Lie on your back, bend your knees, and bring one knee up to your chest and hold it there. Repeat with the other knee, then bring both knees to your chest. When holding your knee to your chest, grab your thigh rather than your lower leg to avoid over flexing your knee.     Published by [employer].  This content is reviewed periodically and is subject to change as new health information becomes available. The information is intended to inform and educate and is not a replacement for medical evaluation, advice, diagnosis or treatment by a healthcare professional.   Developed by Carolina Conrad RN, [place], and [employer].   ? 2010 [facility] and/or its affiliates. All Rights Reserved.           Low Back Pain Exercise      Standing hamstring stretch: Put the heel of one leg on a stool about 15 inches high. Keep your leg straight. Lean forward, bending at the hips until you feel a mild stretch in the back of your thigh. Make sure you do not roll your shoulders or bend at the waist when doing this. You want to stretch your leg, not your lower back. Hold the stretch for 15 to 30 seconds. Repeat with each leg 3 times.   Cat and camel: Get down on your hands and knees. Let your stomach sag, allowing your back to curve downward. Hold this position for 5 seconds. Then arch your back and hold for 5 seconds. Do 3 sets of 10.   Quadruped arm and leg raise: Get down on your hands and knees. Pull in your belly button and tighten your abdominal muscles to stiffen your spine. While keeping your abdominals tight, raise one arm and the opposite leg away from you. Hold this position for 5 seconds. Lower your arm and leg slowly and change sides. Do this 10 times on each side.   Pelvic tilt: Lie on your back with your  knees bent and your feet flat on the floor. Tighten your abdominal muscles and push your lower back into the floor. Hold this position for 5 seconds, then relax. Do 3 sets of 10.   Partial curl: Lie on your back with your knees bent and your feet flat on the floor. Tighten your stomach muscles. Tuck your chin to your chest. With your hands stretched out in front of you, curl your upper body forward until your shoulders clear the floor. Hold this position for 3 seconds. Don't hold your breath. It helps to breathe out as you lift your shoulders up. Relax back to the floor. Repeat 10 times. Build to 3 sets of 10. To challenge yourself, clasp your hands behind your head and keep your elbows out to the side.   Gluteal stretch: Lie on your back with both knees bent. Rest the ankle of one leg over the knee of your other leg. Grasp the thigh of the bottom leg and pull toward your chest. You will feel a stretch along the buttocks and possibly along the outside of your hip. Hold the stretch for 15 to 30 seconds. Repeat 3 times with each leg.   Extension exercise:   0. Lie face down on the floor for 5 minutes. If this hurts too much, lie face down with a pillow under your stomach. This should relieve your leg or back pain. When you can lie on your stomach for 5 minutes without a pillow, you can continue with Part B of this exercise.   0. After lying on your stomach for 5 minutes, prop yourself up on your elbows for another 5 minutes. If you can do this without having more leg or buttock pain, you can start doing part C of this exercise.   0. Lie on your stomach with your hands under your shoulders. Then press down on your hands and extend your elbows while keeping your hips flat on the floor. Hold for 1 second and lower yourself to the floor. Do 3 to 5 sets of 10 repetitions. Rest for 1 minute between sets. You should have no pain in your legs when you do this, but it is normal to feel some pain in your lower back.   Do this  exercise several times a day.   Side plank: Lie on your side with your legs, hips, and shoulders in a straight line. Prop yourself up onto your forearm so your elbow is directly under your shoulder. Lift your hips off the floor and balance on your forearm and the outside of your foot. Try to hold this position for 15 seconds, then slowly lower your hip to the ground. Switch sides and repeat. Work up to holding for 1 minute or longer. This exercise can be made easier by starting with your knees and hips flexed toward your chest.   Published by [employer].  This content is reviewed periodically and is subject to change as new health information becomes available. The information is intended to inform and educate and is not a replacement for medical evaluation, advice, diagnosis or treatment by a healthcare professional.   Written by Liat Cannon, MS, PT, and Carolina Gonzalez PT, [facility], [facility], for [facility]   ? 2010 [facility] and/or its affiliates. All Rights Reserved.         Copyright   [employer] 2011                  Follow-ups after your visit        Who to contact     If you have questions or need follow up information about today's clinic visit or your schedule please contact [facility] directly at [phone].  Normal or non-critical lab and imaging results will be communicated to you by MyChart, letter or phone within 4 business days after the clinic has received the results. If you do not hear from us within 7 days, please contact the clinic through MyChart or phone. If you have a critical or abnormal lab result, we will notify you by phone as soon as possible.  Submit refill requests through [employer] or call your pharmacy and they will forward the refill request to us. Please allow 3 business days for your refill to be completed.          Additional Information About Your Visit        Care EveryWhere ID     This is your Care EveryWhere ID. This could be used by other  "organizations to access your [facility] medical records  [id number]        Your Vitals Were     Pulse Temperature Respirations Height Pulse Oximetry BMI (Body Mass Index)    70 97.4  F (36.3  C) (Tympanic) 16 5' 11\" (1.803 m) 96% 29.99 kg/m2       Blood Pressure from Last 3 Encounters:   12/03/18 134/82   01/06/18 136/88   06/05/17 133/86    Weight from Last 3 Encounters:   12/03/18 215 lb (97.5 kg)   01/06/18 215 lb (97.5 kg)   06/05/17 205 lb (93 kg)              Today, you had the following     No orders found for display         Today's Medication Changes          These changes are accurate as of 12/3/18  2:57 PM.  If you have any questions, ask your nurse or doctor.               Start taking these medicines.        Dose/Directions    Capsaicin 0.1 % cream   Used for:  Strain of lumbar region, initial encounter, Spasm of muscle of lower back   Started by:  HARSHA Kelly MD        Apply topically 4 times daily Apply sparingly using rubber gloves.   Quantity:  30 g   Refills:  11       cyclobenzaprine 10 MG tablet   Commonly known as:  FLEXERIL   Used for:  Spasm of muscle of lower back, Strain of lumbar region, initial encounter   Started by:  HARSHA Kelly MD        Dose:  10 mg   Take 1 tablet (10 mg) by mouth 3 times daily as needed for muscle spasms   Quantity:  90 tablet   Refills:  1            Where to get your medicines      These medications were sent to [facility] [place], [place] - [address]  [address] [facility] [address]     Phone:  [phone]     Capsaicin 0.1 % cream    cyclobenzaprine 10 MG tablet                Primary Care Provider Office Phone # Fax #    Maryellen Carrizales [phone] [address] [facility] [address]        Equal Access to Services     [facility] COURTNEY AH: Hadii raquel osorio hadasho Soomaali, waaxda luqadaha, qaybta kaalmada adeegyada, deborah hernandez. So [facility] " [phone].    ATENCIÓN: Si kamran liu, tiene a berger disposición servicios gratuitos de asistencia lingüística. Latosha koenig [phone].    We comply with applicable federal civil rights laws and [place] laws. We do not discriminate on the basis of race, color, national origin, age, disability, sex, sexual orientation, or gender identity.            Thank you!     Thank you for choosing [facility]  for your care. Our goal is always to provide you with excellent care. Hearing back from our patients is one way we can continue to improve our services. Please take a few minutes to complete the written survey that you may receive in the mail after your visit with us. Thank you!             Your Updated Medication List - Protect others around you: Learn how to safely use, store and throw away your medicines at www.disposemymeds.org.          This list is accurate as of 12/3/18  2:57 PM.  Always use your most recent med list.                   Brand Name Dispense Instructions for use Diagnosis    Capsaicin 0.1 % cream     30 g    Apply topically 4 times daily Apply sparingly using rubber gloves.    Strain of lumbar region, initial encounter, Spasm of muscle of lower back       cyclobenzaprine 10 MG tablet    FLEXERIL    90 tablet    Take 1 tablet (10 mg) by mouth 3 times daily as needed for muscle spasms    Spasm of muscle of lower back, Strain of lumbar region, initial encounter       dexamethasone 4 MG tablet    DECADRON    2 tablet    Take 2 tablets (8 mg) by mouth once for 1 dose        gabapentin 600 MG tablet    NEURONTIN    90 tablet    Take 1 tablet (600 mg) by mouth nightly as needed    Nocturnal leg cramps, Bilateral leg cramps       rOPINIRole 2 MG 24 hr tablet    REQUIP ER     Take 2 mg by mouth At Bedtime        tadalafil 20 MG tablet    CIALIS    6 tablet    Take 1 tablet by mouth daily as needed for erectile dysfunction.    Impotence of organic origin       traZODone 100 MG tablet     DESYREL    90 tablet    Take 0.5-1 tablets ( mg) by mouth nightly as needed for sleep    Persistent disorder of initiating or maintaining sleep

## 2019-03-23 PROCEDURE — 99283 EMERGENCY DEPT VISIT LOW MDM: CPT | Performed by: FAMILY MEDICINE

## 2019-03-23 PROCEDURE — 99282 EMERGENCY DEPT VISIT SF MDM: CPT | Mod: Z6 | Performed by: FAMILY MEDICINE

## 2019-03-24 ENCOUNTER — HOSPITAL ENCOUNTER (EMERGENCY)
Facility: CLINIC | Age: 34
Discharge: HOME | End: 2019-03-24
Attending: FAMILY MEDICINE | Admitting: FAMILY MEDICINE
Payer: COMMERCIAL

## 2019-03-24 ENCOUNTER — APPOINTMENT (OUTPATIENT)
Dept: GENERAL RADIOLOGY | Facility: CLINIC | Age: 34
End: 2019-03-24
Attending: FAMILY MEDICINE
Payer: COMMERCIAL

## 2019-03-24 VITALS
HEIGHT: 71 IN | TEMPERATURE: 97.9 F | OXYGEN SATURATION: 95 % | DIASTOLIC BLOOD PRESSURE: 98 MMHG | RESPIRATION RATE: 18 BRPM | SYSTOLIC BLOOD PRESSURE: 144 MMHG | WEIGHT: 212 LBS | HEART RATE: 86 BPM | BODY MASS INDEX: 29.68 KG/M2

## 2019-03-24 DIAGNOSIS — S59.909A: ICD-10-CM

## 2019-03-24 DIAGNOSIS — S50.01XA CONTUSION OF RIGHT ELBOW, INITIAL ENCOUNTER: ICD-10-CM

## 2019-03-24 PROCEDURE — 73080 X-RAY EXAM OF ELBOW: CPT | Mod: RT

## 2019-03-24 ASSESSMENT — MIFFLIN-ST. JEOR: SCORE: 1918.76

## 2019-03-24 NOTE — ED AVS SNAPSHOT
[facility] Emergency Department  [address] [facility] [address]  Phone:  [phone]  Fax:  [phone]                                    Germain Iraheta   MRN: [id number]    Department:  [facility] Emergency Department   Date of Visit:  3/23/2019           After Visit Summary Signature Page    I have received my discharge instructions, and my questions have been answered. I have discussed any challenges I see with this plan with the nurse or doctor.    ..........................................................................................................................................  Patient/Patient Representative Signature      ..........................................................................................................................................  Patient Representative Print Name and Relationship to Patient    ..................................................               ................................................  Date                                   Time    ..........................................................................................................................................  Reviewed by Signature/Title    ...................................................              ..............................................  Date                                               Time          22EPIC Rev 08/18

## 2019-03-24 NOTE — ED PROVIDER NOTES
"HPI  Current medications, past medical history, and social history are reviewed.    Patient is a 33-year-old male presenting with an elbow injury.  He fell twice today bumping his right elbow.  Most recent was while skiing.  He denies severe pain but he does say it is quite tender to the touch.  No prior fractures or dislocations of the elbow are reported.  No other injury reported during the fall.    ROS: All other review of systems are negative other than that noted above.      No past medical history on file.  No past surgical history on file.  Medicines    Capsaicin 0.1 % cream   cyclobenzaprine (FLEXERIL) 10 MG tablet   dexamethasone (DECADRON) 4 MG tablet   gabapentin (NEURONTIN) 600 MG tablet   rOPINIRole HCl (REQUIP) 2 MG TB24 tablet   tadalafil (CIALIS) 20 MG tablet   traZODone (DESYREL) 100 MG tablet     Family History   Problem Relation Age of Onset     Diabetes Father      Cancer Maternal Grandfather         lung     Hypertension No family hx of      Social History     Tobacco Use     Smoking status: Former Smoker     Smokeless tobacco: Never Used     Tobacco comment: quit 2007   Substance Use Topics     Alcohol use: Yes     Alcohol/week: 0.0 oz     Comment: 5 drinks a week     Drug use: No         PHYSICAL  BP (!) 144/98   Pulse 86   Temp 97.9  F (36.6  C) (Oral)   Resp 18   Ht 1.803 m (5' 11\")   Wt 96.2 kg (212 lb)   SpO2 95%   BMI 29.57 kg/m    General: Patient is alert and in moderate distress.  Neurological: Alert.  Moving upper and lower extremities equally, bilaterally.  Head / Neck: Atraumatic.  Ears: Not done.  Eyes: Pupils are equal, round, and reactive.  Normal conjunctiva.  Nose: Midline.  No epistaxis.  Mouth / Throat:  Moist. Respiratory: No respiratory distress.  Cardiovascular: Regular rhythm.  Peripheral extremities are warm.  Abdomen / Pelvis: Not done.  Genitalia: Not done.  Musculoskeletal: The patient is a large area of swelling over the olecranon, right sided.  He has " tenderness when palpating in this region.  He has full flexion and extension with minimal pain.  Skin: There is a superficial skin tear/laceration involving the right proximal forearm, just distal to the olecranon.  No foreign body.  Not contaminated.  No active bleeding.      PHYSICIAN  0012.  The patient has an obvious injury involving his right elbow.  X-ray pending.    IMAGING  Images reviewed by me.  Radiology report also reviewed.  XR Elbow Right G/E 3 Views   Final Result   IMPRESSION: Mild soft tissue swelling about the elbow. No acute   fracture or effusion. Minimal degenerative changes.      SHANIA URBINA MD          IMPRESSION    ICD-10-CM    1. Contusion of right elbow, initial encounter S50.01XA    2. Injury of bursa of elbow S59.909A             Modesto Pradhan MD  03/27/19 7323

## 2019-03-24 NOTE — LETTER
March 24, 2019      To Whom It May Concern:      Germain Iraheta was seen in our Emergency Department today, 03/24/19.  I expect his condition to improve over the next 3-5 days.  He may return to work/school immediately but may benefit from limited use of the right arm over the next 3 days.    Sincerely,        Modesto Pradhan MD

## 2019-03-24 NOTE — ED TRIAGE NOTES
Bumped R elbow early sat am around 0200 then fell today skiing around 1830 hitting same elbow- has significantly swelling and pain

## 2019-03-24 NOTE — DISCHARGE INSTRUCTIONS
Return to the Emergency Room if the following occurs:     Fever >101, severely worsened pain, or for any concern at anytime.    Or, follow-up with the following provider as we discussed:     Return to your primary doctor as needed.    Medications discussed:    Ibuprofen 600 mg every six hours for pain (7 days duration).  Tylenol 1000 mg every six hours for pain (7 days duration).  Therefore, you can alternate these every three hours and do it safely.    If you received pain-relieving or sedating medication during your time in the ER, avoid alcohol, driving automobiles, or working with machinery.  Also, a responsible adult must stay with you.        Call the Nurse Advice Line at [phone] or [phone] for any concern at anytime.

## 2019-04-08 ENCOUNTER — OFFICE VISIT (OUTPATIENT)
Dept: FAMILY MEDICINE | Facility: CLINIC | Age: 34
End: 2019-04-08
Payer: COMMERCIAL

## 2019-04-08 VITALS
TEMPERATURE: 98.1 F | WEIGHT: 213 LBS | BODY MASS INDEX: 29.82 KG/M2 | SYSTOLIC BLOOD PRESSURE: 130 MMHG | OXYGEN SATURATION: 97 % | HEART RATE: 70 BPM | RESPIRATION RATE: 16 BRPM | HEIGHT: 71 IN | DIASTOLIC BLOOD PRESSURE: 80 MMHG

## 2019-04-08 DIAGNOSIS — G47.62 NOCTURNAL LEG CRAMPS: ICD-10-CM

## 2019-04-08 DIAGNOSIS — M77.50 BONE SPUR OF FOOT: ICD-10-CM

## 2019-04-08 DIAGNOSIS — M70.21 OLECRANON BURSITIS OF RIGHT ELBOW: ICD-10-CM

## 2019-04-08 DIAGNOSIS — N52.9 IMPOTENCE OF ORGANIC ORIGIN: ICD-10-CM

## 2019-04-08 DIAGNOSIS — M62.830 SPASM OF MUSCLE OF LOWER BACK: ICD-10-CM

## 2019-04-08 DIAGNOSIS — S39.012A STRAIN OF LUMBAR REGION, INITIAL ENCOUNTER: ICD-10-CM

## 2019-04-08 DIAGNOSIS — R25.2 BILATERAL LEG CRAMPS: ICD-10-CM

## 2019-04-08 DIAGNOSIS — G47.00 PERSISTENT DISORDER OF INITIATING OR MAINTAINING SLEEP: Primary | ICD-10-CM

## 2019-04-08 PROCEDURE — 99214 OFFICE O/P EST MOD 30 MIN: CPT | Performed by: NURSE PRACTITIONER

## 2019-04-08 RX ORDER — GABAPENTIN 600 MG/1
600 TABLET ORAL
Qty: 90 TABLET | Refills: 1 | Status: SHIPPED | OUTPATIENT
Start: 2019-04-08 | End: 2019-10-01

## 2019-04-08 RX ORDER — TRAZODONE HYDROCHLORIDE 100 MG/1
50-100 TABLET ORAL
Qty: 90 TABLET | Refills: 1 | Status: SHIPPED | OUTPATIENT
Start: 2019-04-08 | End: 2019-04-08 | Stop reason: DRUGHIGH

## 2019-04-08 RX ORDER — CYCLOBENZAPRINE HCL 10 MG
10 TABLET ORAL 3 TIMES DAILY PRN
Qty: 90 TABLET | Refills: 1 | Status: SHIPPED | OUTPATIENT
Start: 2019-04-08 | End: 2019-07-26

## 2019-04-08 RX ORDER — TRAZODONE HYDROCHLORIDE 100 MG/1
200 TABLET ORAL AT BEDTIME
Qty: 180 TABLET | Refills: 1 | Status: SHIPPED | OUTPATIENT
Start: 2019-04-08 | End: 2019-10-01

## 2019-04-08 RX ORDER — TADALAFIL 20 MG/1
20 TABLET ORAL DAILY PRN
Qty: 6 TABLET | Refills: 12 | Status: SHIPPED | OUTPATIENT
Start: 2019-04-08 | End: 2020-04-27

## 2019-04-08 ASSESSMENT — MIFFLIN-ST. JEOR: SCORE: 1923.29

## 2019-04-08 NOTE — PROGRESS NOTES
"  SUBJECTIVE:   Germain Iraheta is a 33 year old male who presents to clinic today for the following health issues:      HPI  Pt is here for refill medications and right elbow he wants drained or he will drain himself joselin home, he has right foot pain and was told he has a bone spur.  Problem list and histories reviewed & adjusted, as indicated.  Additional history: wishing to establish care.  He states the last provider made him come in every month and that was \"crazy\"  He has RLS, insomnia and also some chronic LBP and wishes to have refills.  He also reports having a \"bone spur\" in his right foot. X rays were normal but he continues to have pain.  Swelling of right distal elbow for a few days.  He works in [employer] at [employer].          Patient Active Problem List   Diagnosis     CARDIOVASCULAR SCREENING; LDL GOAL LESS THAN 160     Impotence of organic origin     Past Surgical History:   Procedure Laterality Date     PE TUBES         Social History     Tobacco Use     Smoking status: Current Every Day Smoker     Packs/day: 0.50     Years: 1.00     Pack years: 0.50     Types: Cigarettes     Smokeless tobacco: Never Used     Tobacco comment: quit 2007   Substance Use Topics     Alcohol use: Yes     Alcohol/week: 0.0 oz     Comment: 5 drinks a week     Family History   Problem Relation Age of Onset     Diabetes Father      Unknown/Adopted Father      Cancer Maternal Grandfather         lung     Hypertension No family hx of          Current Outpatient Medications   Medication Sig Dispense Refill     cyclobenzaprine (FLEXERIL) 10 MG tablet Take 1 tablet (10 mg) by mouth 3 times daily as needed for muscle spasms 90 tablet 1     gabapentin (NEURONTIN) 600 MG tablet Take 1 tablet (600 mg) by mouth nightly as needed (restless leg syndrome) 90 tablet 1     tadalafil (CIALIS) 20 MG tablet Take 1 tablet (20 mg) by mouth daily as needed (erectile dysfunction) 6 tablet 12     traZODone (DESYREL) 100 MG tablet Take 2 " "tablets (200 mg) by mouth At Bedtime 180 tablet 1     dexamethasone (DECADRON) 4 MG tablet Take 2 tablets (8 mg) by mouth once for 1 dose 2 tablet 0     Allergies   Allergen Reactions     Amoxicillin Rash     Ceclor [Cefaclor Monohydrate] Rash     Erythromycin Rash     Lorabid [Loracarbef] Rash     Penicillins Rash     Recent Labs   Lab Test 06/06/13  0725   LDL 99   HDL 49   TRIG 85      BP Readings from Last 3 Encounters:   04/08/19 130/80   03/24/19 (!) 144/98   12/03/18 134/82    Wt Readings from Last 3 Encounters:   04/08/19 96.6 kg (213 lb)   03/24/19 96.2 kg (212 lb)   12/03/18 97.5 kg (215 lb)                     ROS: 10 point ROS neg other than the symptoms noted above in the HPI.    OBJECTIVE:     /80 (BP Location: Right arm, Patient Position: Chair, Cuff Size: Adult Large)   Pulse 70   Temp 98.1  F (36.7  C) (Oral)   Resp 16   Ht 1.803 m (5' 11\")   Wt 96.6 kg (213 lb)   SpO2 97%   BMI 29.71 kg/m    Body mass index is 29.71 kg/m .  GENERAL: healthy, alert and no distress  HENT: ear canals and TM's normal, pharynx without erythema  NECK: no adenopathy, no asymmetry  RESP: lungs clear to auscultation - no rales, rhonchi or wheezes  CV: regular rate and rhythm, normal S1 S2, no S3 or S4, no murmur  ABDOMEN: soft, nontender, no hepatosplenomegaly, no masses and bowel sounds normal  MS: no gross musculoskeletal defects noted      Diagnostic Test Results:  No results found for this or any previous visit (from the past 24 hour(s)).    ASSESSMENT/PLAN:             1. Persistent disorder of initiating or maintaining sleep    - traZODone (DESYREL) 100 MG tablet; Take 2 tablets (200 mg) by mouth At Bedtime  Dispense: 180 tablet; Refill: 1    2. Bilateral leg cramps    - gabapentin (NEURONTIN) 600 MG tablet; Take 1 tablet (600 mg) by mouth nightly as needed (restless leg syndrome)  Dispense: 90 tablet; Refill: 1    3. Nocturnal leg cramps    - gabapentin (NEURONTIN) 600 MG tablet; Take 1 tablet (600 mg) " by mouth nightly as needed (restless leg syndrome)  Dispense: 90 tablet; Refill: 1    4. Impotence of organic origin    - tadalafil (CIALIS) 20 MG tablet; Take 1 tablet (20 mg) by mouth daily as needed (erectile dysfunction)  Dispense: 6 tablet; Refill: 12    5. Spasm of muscle of lower back    - cyclobenzaprine (FLEXERIL) 10 MG tablet; Take 1 tablet (10 mg) by mouth 3 times daily as needed for muscle spasms  Dispense: 90 tablet; Refill: 1    6. Strain of lumbar region, initial encounter    - cyclobenzaprine (FLEXERIL) 10 MG tablet; Take 1 tablet (10 mg) by mouth 3 times daily as needed for muscle spasms  Dispense: 90 tablet; Refill: 1    7. Bone spur of foot    - PODIATRY/FOOT & ANKLE SURGERY REFERRAL    8. Olecranon bursitis of right elbow    Ace wrap applied.        See Patient Instructions  Patient Instructions   Refills sent.  To podiatry.  Wrap right elbow.      Our Clinic hours are:  Mondays    7:20 am - 7 pm  Tues -  Fri  7:20 am - 5 pm    Clinic Phone: [phone]    The clinic lab opens at 7:30 am Mon - Fri and appointments are required.    [facility]  Ph. [phone]  Monday  8 am - 7pm  Tues - Fri 8 am - 5:30 pm             KRISTINA Jones CNP  [facility]

## 2019-04-08 NOTE — PATIENT INSTRUCTIONS
Refills sent.  To podiatry.  Wrap right elbow.      Our Clinic hours are:  Mondays    7:20 am - 7 pm  Tues -  Fri  7:20 am - 5 pm    Clinic Phone: [phone]    The clinic lab opens at 7:30 am Mon - Fri and appointments are required.    [facility]  Ph. [phone]  Monday  8 am - 7pm  Tues - Fri 8 am - 5:30 pm

## 2019-07-03 ENCOUNTER — HOSPITAL ENCOUNTER (EMERGENCY)
Facility: CLINIC | Age: 34
Discharge: HOME | End: 2019-07-03
Attending: STUDENT IN AN ORGANIZED HEALTH CARE EDUCATION/TRAINING PROGRAM | Admitting: STUDENT IN AN ORGANIZED HEALTH CARE EDUCATION/TRAINING PROGRAM
Payer: COMMERCIAL

## 2019-07-03 VITALS
HEART RATE: 75 BPM | DIASTOLIC BLOOD PRESSURE: 92 MMHG | TEMPERATURE: 98.9 F | OXYGEN SATURATION: 98 % | RESPIRATION RATE: 16 BRPM | SYSTOLIC BLOOD PRESSURE: 150 MMHG

## 2019-07-03 DIAGNOSIS — L03.113 CELLULITIS OF RIGHT UPPER EXTREMITY: ICD-10-CM

## 2019-07-03 LAB
ALBUMIN SERPL-MCNC: 4.1 G/DL (ref 3.4–5)
ALP SERPL-CCNC: 91 U/L (ref 40–150)
ALT SERPL W P-5'-P-CCNC: 45 U/L (ref 0–70)
ANION GAP SERPL CALCULATED.3IONS-SCNC: 7 MMOL/L (ref 3–14)
AST SERPL W P-5'-P-CCNC: 23 U/L (ref 0–45)
BASOPHILS # BLD AUTO: 0.1 10E9/L (ref 0–0.2)
BASOPHILS NFR BLD AUTO: 1.2 %
BILIRUB SERPL-MCNC: 0.4 MG/DL (ref 0.2–1.3)
BUN SERPL-MCNC: 13 MG/DL (ref 7–30)
CALCIUM SERPL-MCNC: 9.6 MG/DL (ref 8.5–10.1)
CHLORIDE SERPL-SCNC: 103 MMOL/L (ref 94–109)
CO2 SERPL-SCNC: 28 MMOL/L (ref 20–32)
CREAT SERPL-MCNC: 0.97 MG/DL (ref 0.66–1.25)
DIFFERENTIAL METHOD BLD: NORMAL
EOSINOPHIL # BLD AUTO: 0.3 10E9/L (ref 0–0.7)
EOSINOPHIL NFR BLD AUTO: 4.6 %
ERYTHROCYTE [DISTWIDTH] IN BLOOD BY AUTOMATED COUNT: 12.1 % (ref 10–15)
GFR SERPL CREATININE-BSD FRML MDRD: >90 ML/MIN/{1.73_M2}
GLUCOSE SERPL-MCNC: 85 MG/DL (ref 70–99)
HCT VFR BLD AUTO: 45.3 % (ref 40–53)
HGB BLD-MCNC: 14.9 G/DL (ref 13.3–17.7)
IMM GRANULOCYTES # BLD: 0 10E9/L (ref 0–0.4)
IMM GRANULOCYTES NFR BLD: 0.3 %
LYMPHOCYTES # BLD AUTO: 1.8 10E9/L (ref 0.8–5.3)
LYMPHOCYTES NFR BLD AUTO: 27.2 %
MCH RBC QN AUTO: 29.8 PG (ref 26.5–33)
MCHC RBC AUTO-ENTMCNC: 32.9 G/DL (ref 31.5–36.5)
MCV RBC AUTO: 91 FL (ref 78–100)
MONOCYTES # BLD AUTO: 0.8 10E9/L (ref 0–1.3)
MONOCYTES NFR BLD AUTO: 12.3 %
NEUTROPHILS # BLD AUTO: 3.5 10E9/L (ref 1.6–8.3)
NEUTROPHILS NFR BLD AUTO: 54.4 %
NRBC # BLD AUTO: 0 10*3/UL
NRBC BLD AUTO-RTO: 0 /100
PLATELET # BLD AUTO: 281 10E9/L (ref 150–450)
POTASSIUM SERPL-SCNC: 3.8 MMOL/L (ref 3.4–5.3)
PROT SERPL-MCNC: 7.8 G/DL (ref 6.8–8.8)
RBC # BLD AUTO: 5 10E12/L (ref 4.4–5.9)
SODIUM SERPL-SCNC: 138 MMOL/L (ref 133–144)
WBC # BLD AUTO: 6.5 10E9/L (ref 4–11)

## 2019-07-03 PROCEDURE — 90715 TDAP VACCINE 7 YRS/> IM: CPT | Performed by: STUDENT IN AN ORGANIZED HEALTH CARE EDUCATION/TRAINING PROGRAM

## 2019-07-03 PROCEDURE — 25000128 H RX IP 250 OP 636: Performed by: STUDENT IN AN ORGANIZED HEALTH CARE EDUCATION/TRAINING PROGRAM

## 2019-07-03 PROCEDURE — 99284 EMERGENCY DEPT VISIT MOD MDM: CPT | Mod: Z6 | Performed by: STUDENT IN AN ORGANIZED HEALTH CARE EDUCATION/TRAINING PROGRAM

## 2019-07-03 PROCEDURE — 85025 COMPLETE CBC W/AUTO DIFF WBC: CPT | Performed by: STUDENT IN AN ORGANIZED HEALTH CARE EDUCATION/TRAINING PROGRAM

## 2019-07-03 PROCEDURE — 99284 EMERGENCY DEPT VISIT MOD MDM: CPT | Performed by: STUDENT IN AN ORGANIZED HEALTH CARE EDUCATION/TRAINING PROGRAM

## 2019-07-03 PROCEDURE — 80053 COMPREHEN METABOLIC PANEL: CPT | Performed by: STUDENT IN AN ORGANIZED HEALTH CARE EDUCATION/TRAINING PROGRAM

## 2019-07-03 PROCEDURE — 87040 BLOOD CULTURE FOR BACTERIA: CPT | Performed by: STUDENT IN AN ORGANIZED HEALTH CARE EDUCATION/TRAINING PROGRAM

## 2019-07-03 PROCEDURE — 90471 IMMUNIZATION ADMIN: CPT | Performed by: STUDENT IN AN ORGANIZED HEALTH CARE EDUCATION/TRAINING PROGRAM

## 2019-07-03 PROCEDURE — 96374 THER/PROPH/DIAG INJ IV PUSH: CPT | Performed by: STUDENT IN AN ORGANIZED HEALTH CARE EDUCATION/TRAINING PROGRAM

## 2019-07-03 RX ORDER — SULFAMETHOXAZOLE/TRIMETHOPRIM 800-160 MG
1 TABLET ORAL 2 TIMES DAILY
Qty: 20 TABLET | Refills: 0 | Status: SHIPPED | OUTPATIENT
Start: 2019-07-03 | End: 2019-07-13

## 2019-07-03 RX ORDER — ONDANSETRON 2 MG/ML
4 INJECTION INTRAMUSCULAR; INTRAVENOUS ONCE
Status: COMPLETED | OUTPATIENT
Start: 2019-07-03 | End: 2019-07-03

## 2019-07-03 RX ORDER — CLINDAMYCIN PHOSPHATE 10 MG/G
1 GEL TOPICAL 2 TIMES DAILY
Qty: 60 G | Refills: 0 | Status: SHIPPED | OUTPATIENT
Start: 2019-07-03 | End: 2020-05-29

## 2019-07-03 RX ORDER — CEPHALEXIN 500 MG/1
500 CAPSULE ORAL 4 TIMES DAILY
Qty: 40 CAPSULE | Refills: 0 | Status: SHIPPED | OUTPATIENT
Start: 2019-07-03 | End: 2019-07-13

## 2019-07-03 RX ADMIN — CLOSTRIDIUM TETANI TOXOID ANTIGEN (FORMALDEHYDE INACTIVATED), CORYNEBACTERIUM DIPHTHERIAE TOXOID ANTIGEN (FORMALDEHYDE INACTIVATED), BORDETELLA PERTUSSIS TOXOID ANTIGEN (GLUTARALDEHYDE INACTIVATED), BORDETELLA PERTUSSIS FILAMENTOUS HEMAGGLUTININ ANTIGEN (FORMALDEHYDE INACTIVATED), BORDETELLA PERTUSSIS PERTACTIN ANTIGEN, AND BORDETELLA PERTUSSIS FIMBRIAE 2/3 ANTIGEN 0.5 ML: 5; 2; 2.5; 5; 3; 5 INJECTION, SUSPENSION INTRAMUSCULAR at 17:26

## 2019-07-03 RX ADMIN — ONDANSETRON 4 MG: 2 INJECTION INTRAMUSCULAR; INTRAVENOUS at 17:43

## 2019-07-03 NOTE — ED PROVIDER NOTES
History     Chief Complaint   Patient presents with     Wound Infection     tattoo site     HPI  Germain Iraheta is a 33 year old male who presents for evaluation of a warm, red, and tender rash forming around a new tattoo site of his right upper extremity.  Patient explains that he had his most recent tattoo performed along the proximal and lateral aspect of his right upper extremity at [facility] last Friday.  Over the past 3 days he has noted progressive redness and swelling around the tattoo, the redness has spread nearly circumferentially around the proximal right upper extremity.  He has been applying a and D ointment without improvement.  He denies fever, chills, abscess formation or purulent discharge.  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    Allergies:  Allergies   Allergen Reactions     Amoxicillin Rash     Ceclor [Cefaclor Monohydrate] Rash     Erythromycin Rash     Lorabid [Loracarbef] Rash     Penicillins Rash       Problem List:    Patient Active Problem List    Diagnosis Date Noted     Impotence of organic origin 06/05/2013     Priority: Medium     CARDIOVASCULAR SCREENING; LDL GOAL LESS THAN 160 06/04/2013     Priority: Medium        Past Medical History:    Past Medical History:   Diagnosis Date     ED (erectile dysfunction)      Insomnia      RLS (restless legs syndrome)      Tobacco use disorder        Past Surgical History:    Past Surgical History:   Procedure Laterality Date     PE TUBES         Family History:    Family History   Problem Relation Age of Onset     Diabetes Father      Unknown/Adopted Father      Cancer Maternal Grandfather         lung     Hypertension No family hx of        Social History:  Marital Status:  Single [1]  Social History     Tobacco Use     Smoking status: Current Every Day Smoker     Packs/day: 0.50     Years: 1.00     Pack years: 0.50     Types: Cigarettes     Smokeless  tobacco: Never Used     Tobacco comment: quit 2007   Substance Use Topics     Alcohol use: Yes     Alcohol/week: 0.0 oz     Comment: 5 drinks a week     Drug use: No     Comment: past use        Medications:      cephALEXin (KEFLEX) 500 MG capsule   clindamycin (CLINDAMAX) 1 % external gel   sulfamethoxazole-trimethoprim (BACTRIM DS) 800-160 MG tablet   cyclobenzaprine (FLEXERIL) 10 MG tablet   gabapentin (NEURONTIN) 600 MG tablet   tadalafil (CIALIS) 20 MG tablet   traZODone (DESYREL) 100 MG tablet         Review of Systems  Constitutional:  Negative for fever or chills.  Respiratory:  Negative for cough or shortness of breath.  Gastrointestinal:  Negative for abdominal pain, nausea or vomiting.   Musculoskeletal:  Negative for joint pain or swelling.   Skin:  Positive for warm, red, and tender rash or right arm.    All others reviewed and are negative.      Physical Exam   BP: (!) 153/102  Pulse: 75  Heart Rate: 75  Temp: 98  F (36.7  C)  Resp: 16  SpO2: 97 %      Physical Exam  Constitutional:  Well developed, well nourished.  Appears nontoxic and in no acute distress.   HENT:  Normocephalic and atraumatic.  Eyes:  Conjunctivae are normal.  Neck:  Neck supple.  Cardiovascular:  No cyanosis.   Respiratory:  Effort normal, no respiratory distress.   Musculoskeletal:  Moves extremities spontaneously.  No sign of arthritis of the right shoulder, elbow, wrist.  Neurological:  Patient is alert.  Skin:  Skin is warm and dry.  Warm and tender cellulitic appearing rash around the proximal right upper extremity, spreads from the tattoo site of lateral mid humerus anteriorly and across to the medial aspect of the right upper extremity but is not circumferential or involving the posterior aspect.  No sign of lymphadenitis.  Psychiatric:  Normal mood and affect.      ED Course        Procedures               Critical Care time:  none               Results for orders placed or performed during the hospital encounter of  07/03/19 (from the past 24 hour(s))   CBC with platelets differential   Result Value Ref Range    WBC 6.5 4.0 - 11.0 10e9/L    RBC Count 5.00 4.4 - 5.9 10e12/L    Hemoglobin 14.9 13.3 - 17.7 g/dL    Hematocrit 45.3 40.0 - 53.0 %    MCV 91 78 - 100 fl    MCH 29.8 26.5 - 33.0 pg    MCHC 32.9 31.5 - 36.5 g/dL    RDW 12.1 10.0 - 15.0 %    Platelet Count 281 150 - 450 10e9/L    Diff Method Automated Method     % Neutrophils 54.4 %    % Lymphocytes 27.2 %    % Monocytes 12.3 %    % Eosinophils 4.6 %    % Basophils 1.2 %    % Immature Granulocytes 0.3 %    Nucleated RBCs 0 0 /100    Absolute Neutrophil 3.5 1.6 - 8.3 10e9/L    Absolute Lymphocytes 1.8 0.8 - 5.3 10e9/L    Absolute Monocytes 0.8 0.0 - 1.3 10e9/L    Absolute Eosinophils 0.3 0.0 - 0.7 10e9/L    Absolute Basophils 0.1 0.0 - 0.2 10e9/L    Abs Immature Granulocytes 0.0 0 - 0.4 10e9/L    Absolute Nucleated RBC 0.0    Comprehensive metabolic panel   Result Value Ref Range    Sodium 138 133 - 144 mmol/L    Potassium 3.8 3.4 - 5.3 mmol/L    Chloride 103 94 - 109 mmol/L    Carbon Dioxide 28 20 - 32 mmol/L    Anion Gap 7 3 - 14 mmol/L    Glucose 85 70 - 99 mg/dL    Urea Nitrogen 13 7 - 30 mg/dL    Creatinine 0.97 0.66 - 1.25 mg/dL    GFR Estimate >90 >60 mL/min/[1.73_m2]    GFR Estimate If Black >90 >60 mL/min/[1.73_m2]    Calcium 9.6 8.5 - 10.1 mg/dL    Bilirubin Total 0.4 0.2 - 1.3 mg/dL    Albumin 4.1 3.4 - 5.0 g/dL    Protein Total 7.8 6.8 - 8.8 g/dL    Alkaline Phosphatase 91 40 - 150 U/L    ALT 45 0 - 70 U/L    AST 23 0 - 45 U/L   Blood culture   Result Value Ref Range    Specimen Description Blood Left Arm     Special Requests Aerobic and anaerobic bottles received     Culture Micro PENDING    Blood culture   Result Value Ref Range    Specimen Description Blood Left Arm     Special Requests Aerobic and anaerobic bottles received     Culture Micro PENDING        Medications   Tdap (tetanus-diphtheria-acell pertussis) (ADACEL) injection 0.5 mL (0.5 mLs  Intramuscular Given 7/3/19 1726)   ondansetron (ZOFRAN) injection 4 mg (4 mg Intravenous Given 7/3/19 1743)       Assessments & Plan (with Medical Decision Making)   Germain Iraheta is a 33 year old male who presents to the department for evaluation of right, warm, and tender rash around the new tattoo site of the right upper extremity which he had initially performed 5 days prior.  The appearance is consistent with superficial cellulitis, no palpable underlying abscess.  The rash is extensive but not yet circumferential around the proximal upper extremity.  He is afebrile and hemodynamically stable, CBC without leukocytosis.  Patient will be started on dual antibiotic therapy and encouraged to use topical antibiotic ointment over the tattoo while monitoring closely for expected improvement.  He is in agreement discharge plan including return instructions for persistent or progressive signs of infection.    He is agreeable with the discharge plan we discussed including follow up and return instructions for developing symptoms or any other concerns.      Disclaimer:  This note consists of symbols derived from keyboarding, dictation, and/or voice recognition software.  As a result, there may be errors in the script that have gone undetected.  Please consider this when interpreting information found in the chart.            I have reviewed the nursing notes.    I have reviewed the findings, diagnosis, plan and need for follow up with the patient.         Medication List      Started    cephALEXin 500 MG capsule  Commonly known as:  KEFLEX  500 mg, Oral, 4 TIMES DAILY     clindamycin 1 % external gel  Commonly known as:  CLINDAMAX  1 applicator, Topical, 2 TIMES DAILY     sulfamethoxazole-trimethoprim 800-160 MG tablet  Commonly known as:  BACTRIM DS  1 tablet, Oral, 2 TIMES DAILY            Final diagnoses:   Cellulitis of right upper extremity       7/3/2019   [facility] EMERGENCY DEPARTMENT     Zain Galeano,  DO  07/05/19 0842

## 2019-07-03 NOTE — ED AVS SNAPSHOT
[facility] Emergency Department  [address] [address]  Phone:  [phone]  Fax:  [phone]                                    Germain Iraheta   MRN: [id number]    Department:  [facility] Emergency Department   Date of Visit:  7/3/2019           After Visit Summary Signature Page    I have received my discharge instructions, and my questions have been answered. I have discussed any challenges I see with this plan with the nurse or doctor.    ..........................................................................................................................................  Patient/Patient Representative Signature      ..........................................................................................................................................  Patient Representative Print Name and Relationship to Patient    ..................................................               ................................................  Date                                   Time    ..........................................................................................................................................  Reviewed by Signature/Title    ...................................................              ..............................................  Date                                               Time          22EPIC Rev 08/18

## 2019-07-09 LAB
BACTERIA SPEC CULT: NO GROWTH
BACTERIA SPEC CULT: NO GROWTH
Lab: NORMAL
Lab: NORMAL
SPECIMEN SOURCE: NORMAL
SPECIMEN SOURCE: NORMAL

## 2019-07-09 NOTE — RESULT ENCOUNTER NOTE
Final blood culture report is NEGATIVE.    No treatment or change in treatment per [facility] ED Lab Result protocol.

## 2019-07-22 ENCOUNTER — TRANSFERRED RECORDS (OUTPATIENT)
Dept: HEALTH INFORMATION MANAGEMENT | Facility: CLINIC | Age: 34
End: 2019-07-22

## 2019-07-26 ENCOUNTER — TELEPHONE (OUTPATIENT)
Dept: FAMILY MEDICINE | Facility: CLINIC | Age: 34
End: 2019-07-26

## 2019-07-26 DIAGNOSIS — M62.830 SPASM OF MUSCLE OF LOWER BACK: ICD-10-CM

## 2019-07-26 DIAGNOSIS — S39.012A STRAIN OF LUMBAR REGION, INITIAL ENCOUNTER: ICD-10-CM

## 2019-07-26 NOTE — TELEPHONE ENCOUNTER
Routing refill request to provider for review/approval because:  Drug not on the [facility] refill protocol     Last Written Prescription Date:  4-8-19  Last Fill Quantity: 90,  # refills: 1   Last office visit: 4/8/2019 with prescribing provider:  Humphrey Watson Office Visit:        Yamileth NIXON RN

## 2019-07-26 NOTE — TELEPHONE ENCOUNTER
Patient is calling and hoping to get this taken care of today or Sat. Send to [facility].  [facility]

## 2019-07-27 RX ORDER — CYCLOBENZAPRINE HCL 10 MG
TABLET ORAL
Qty: 90 TABLET | Refills: 0 | Status: SHIPPED | OUTPATIENT
Start: 2019-07-27 | End: 2020-05-29

## 2019-08-12 ENCOUNTER — OFFICE VISIT (OUTPATIENT)
Dept: FAMILY MEDICINE | Facility: CLINIC | Age: 34
End: 2019-08-12
Payer: COMMERCIAL

## 2019-08-12 VITALS
RESPIRATION RATE: 16 BRPM | SYSTOLIC BLOOD PRESSURE: 120 MMHG | BODY MASS INDEX: 27.58 KG/M2 | WEIGHT: 197 LBS | HEART RATE: 74 BPM | TEMPERATURE: 97 F | DIASTOLIC BLOOD PRESSURE: 68 MMHG | HEIGHT: 71 IN | OXYGEN SATURATION: 95 %

## 2019-08-12 DIAGNOSIS — G47.00 PERSISTENT DISORDER OF INITIATING OR MAINTAINING SLEEP: Primary | ICD-10-CM

## 2019-08-12 DIAGNOSIS — N52.9 IMPOTENCE OF ORGANIC ORIGIN: ICD-10-CM

## 2019-08-12 DIAGNOSIS — G47.62 NOCTURNAL LEG CRAMPS: ICD-10-CM

## 2019-08-12 LAB — TSH SERPL DL<=0.005 MIU/L-ACNC: 1.11 MU/L (ref 0.4–4)

## 2019-08-12 PROCEDURE — 99214 OFFICE O/P EST MOD 30 MIN: CPT | Performed by: NURSE PRACTITIONER

## 2019-08-12 PROCEDURE — 84443 ASSAY THYROID STIM HORMONE: CPT | Performed by: NURSE PRACTITIONER

## 2019-08-12 PROCEDURE — 36415 COLL VENOUS BLD VENIPUNCTURE: CPT | Performed by: NURSE PRACTITIONER

## 2019-08-12 ASSESSMENT — MIFFLIN-ST. JEOR: SCORE: 1850.72

## 2019-08-12 NOTE — PROGRESS NOTES
Subjective     Germain Iraheta is a 33 year old male who presents to clinic today for the following health issues:    HPI   Pt is here to get his sleeping medication (Trazadone) refilled.      How many servings of fruits and vegetables do you eat daily?  0-1    On average, how many sweetened beverages do you drink each day (soda, juice, sweet tea, etc)?   1    How many days per week do you miss taking your medication? 0      He is somewhat vague with his concerns but after re directing his main issue is sleep. He cannot fall or maintain sleep. 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  He is on gabapentin and flexeril and he thinks to help him sleep. He does have RLS. He also has had muscle problems in his back. Wasn't aware that typically I don't recommend long term use of flexeril for sleep. He denies anxiety or depression. Although states he does worry a lot. He has ED.         Patient Active Problem List   Diagnosis     CARDIOVASCULAR SCREENING; LDL GOAL LESS THAN 160     Impotence of organic origin     Past Surgical History:   Procedure Laterality Date     PE TUBES         Social History     Tobacco Use     Smoking status: Current Every Day Smoker     Packs/day: 0.50     Years: 3.00     Pack years: 1.50     Types: Cigarettes     Smokeless tobacco: Never Used   Substance Use Topics     Alcohol use: Yes     Alcohol/week: 0.0 oz     Comment: 5 drinks a month     Family History   Problem Relation Age of Onset     Diabetes Father      Unknown/Adopted Father      Cancer Father      Cancer Maternal Grandfather         lung     Hypertension No family hx of          Current Outpatient Medications   Medication Sig Dispense Refill     cyclobenzaprine (FLEXERIL) 10 MG tablet TAKE ONE TABLET BY MOUTH THREE TIMES A DAY AS NEEDED FOR MUSCLE SPASMS 90 tablet 0     gabapentin (NEURONTIN) 600 MG tablet Take 1 tablet (600 mg) by mouth nightly as needed (restless leg syndrome) 90 tablet 1  "    tadalafil (CIALIS) 20 MG tablet Take 1 tablet (20 mg) by mouth daily as needed (erectile dysfunction) 6 tablet 12     traZODone (DESYREL) 100 MG tablet Take 2 tablets (200 mg) by mouth At Bedtime 180 tablet 1     clindamycin (CLINDAMAX) 1 % external gel Apply 1 g topically 2 times daily (Patient not taking: Reported on 8/12/2019) 60 g 0     Allergies   Allergen Reactions     Amoxicillin Rash     Ceclor [Cefaclor Monohydrate] Rash     Erythromycin Rash     Lorabid [Loracarbef] Rash     Penicillins Rash     BP Readings from Last 3 Encounters:   08/12/19 120/68   07/03/19 (!) 150/92   04/08/19 130/80    Wt Readings from Last 3 Encounters:   08/12/19 89.4 kg (197 lb)   04/08/19 96.6 kg (213 lb)   03/24/19 96.2 kg (212 lb)                      Reviewed and updated as needed this visit by Provider         Review of Systems   ROS COMP: Constitutional, HEENT, cardiovascular, pulmonary, gi and gu systems are negative, except as otherwise noted.      Objective    /68 (BP Location: Right arm, Patient Position: Chair, Cuff Size: Adult Large)   Pulse 74   Temp 97  F (36.1  C) (Oral)   Resp 16   Ht 1.803 m (5' 11\")   Wt 89.4 kg (197 lb)   SpO2 95%   BMI 27.48 kg/m    Body mass index is 27.48 kg/m .  Physical Exam   GENERAL: healthy, alert and no distress  NECK:  no asymmetry  RESP: lungs clear to auscultation - no rales, rhonchi or wheezes  CV: regular rate and rhythm  MS: no gross musculoskeletal defects noted  PSYCH: appears somewhat anxious, pleasant    Diagnostic Test Results:  Labs reviewed in Epic  No results found for this or any previous visit (from the past 24 hour(s)).        Assessment & Plan     1. Persistent disorder of initiating or maintaining sleep    - SLEEP EVALUATION & MANAGEMENT REFERRAL - [facility]  [phone] (Age 2 and up); Future  - TSH with free T4 reflex    2. Nocturnal leg cramps      3. Impotence of organic origin         Tobacco Cessation:   reports " "that he has been smoking cigarettes.  He has a 1.50 pack-year smoking history. He has never used smokeless tobacco.  Tobacco Cessation Action Plan: Information offered: Patient not interested at this time      BMI:   Estimated body mass index is 27.48 kg/m  as calculated from the following:    Height as of this encounter: 1.803 m (5' 11\").    Weight as of this encounter: 89.4 kg (197 lb).           See Patient Instructions  Patient Instructions   Will be notified of pending labs.  Continue with trazodone and try to wean down on Flexeril use.  Follow up with sleep medicine.      Our Clinic hours are:  Mondays    7:20 am - 7 pm  Tues -  Fri  7:20 am - 5 pm    Clinic Phone: [phone]    The clinic lab opens at 7:30 am Mon - Fri and appointments are required.    [facility]  Ph. [phone]  Monday  8 am - 7pm  Tues - Fri 8 am - 5:30 pm             Return in about 4 weeks (around 9/9/2019) for or sooner if symptoms persist or worsen.    KRISTINA Jones CNP  [facility]      "

## 2019-08-12 NOTE — LETTER
[facility]  [address]  [facility] [address]  Phone: [phone]      8/13/2019     Germain Iraheta  [address]  [facility] [place] [address]      Dear Germain:    Thank you for allowing me to participate in your care. Your recent test results were reviewed and listed below.      Your results are provided below for your review    Results for orders placed or performed in visit on 08/12/19   TSH with free T4 reflex   Result Value Ref Range    TSH 1.11 0.40 - 4.00 mU/L        Thyroid test was fine.           Thank you for choosing [facility]. As a result, please continue with the treatment plan discussed in the office. Return as discussed or sooner if symptoms worsen or fail to improve.     If you have any further questions or concerns, please do not hesitate to contact us.      Sincerely,        SELENA Nair/mh

## 2019-08-12 NOTE — PATIENT INSTRUCTIONS
Will be notified of pending labs.  Continue with trazodone and try to wean down on Flexeril use.  Follow up with sleep medicine.      Our Clinic hours are:  Mondays    7:20 am - 7 pm  Tues -  Fri  7:20 am - 5 pm    Clinic Phone: [phone]    The clinic lab opens at 7:30 am Mon - Fri and appointments are required.    [facility]  Ph. [phone]  Monday  8 am - 7pm  Tues - Fri 8 am - 5:30 pm

## 2019-10-01 DIAGNOSIS — R25.2 BILATERAL LEG CRAMPS: ICD-10-CM

## 2019-10-01 DIAGNOSIS — G47.62 NOCTURNAL LEG CRAMPS: ICD-10-CM

## 2019-10-01 DIAGNOSIS — G47.00 PERSISTENT DISORDER OF INITIATING OR MAINTAINING SLEEP: ICD-10-CM

## 2019-10-01 NOTE — TELEPHONE ENCOUNTER
"Requested Prescriptions   Pending Prescriptions Disp Refills     gabapentin (NEURONTIN) 600 MG tablet [Pharmacy Med Name: GABAPENTIN 600MG TABS] 90 tablet 0     Sig: TAKE ONE TABLET BY MOUTH AT BEDTIME AS NEEDED (RESTLESS LEG SYNDROME)       There is no refill protocol information for this order        Last Written Prescription Date:  4/8/2019  Last Fill Quantity: 90,   # refills: 1  Last Office Visit: 8/12/2019 with Humphrey   Future Office visit:       Routing refill request to provider for review/approval because:  Drug not on the [facility] refill protocol or controlled substance          traZODone (DESYREL) 100 MG tablet [Pharmacy Med Name: TRAZODONE HCL 100MG TABS] 180 tablet 0     Sig: TAKE TWO TABLETS BY MOUTH AT BEDTIME  Last Written Prescription Date:  4/8/2019  Last Fill Quantity: 180,  # refills: 1   Last office visit: 8/12/2019 with prescribing provider: with [place]      Future Office Visit:           Serotonin Modulators Passed - 10/1/2019 10:09 AM        Passed - Recent (12 mo) or future (30 days) visit within the authorizing provider's specialty     Patient has had an office visit with the authorizing provider or a provider within the authorizing providers department within the previous 12 mos or has a future within next 30 days. See \"Patient Info\" tab in inbasket, or \"Choose Columns\" in Meds & Orders section of the refill encounter.              Passed - Medication is active on med list        Passed - Patient is age 18 or older          "

## 2019-10-02 RX ORDER — TRAZODONE HYDROCHLORIDE 100 MG/1
TABLET ORAL
Qty: 180 TABLET | Refills: 0 | Status: SHIPPED | OUTPATIENT
Start: 2019-10-02 | End: 2019-12-03

## 2019-10-02 RX ORDER — GABAPENTIN 600 MG/1
TABLET ORAL
Qty: 90 TABLET | Refills: 0 | Status: SHIPPED | OUTPATIENT
Start: 2019-10-02 | End: 2019-12-14

## 2019-12-03 DIAGNOSIS — G47.00 PERSISTENT DISORDER OF INITIATING OR MAINTAINING SLEEP: ICD-10-CM

## 2019-12-03 RX ORDER — TRAZODONE HYDROCHLORIDE 100 MG/1
TABLET ORAL
Qty: 180 TABLET | Refills: 0 | Status: SHIPPED | OUTPATIENT
Start: 2019-12-03 | End: 2020-02-04

## 2019-12-03 NOTE — TELEPHONE ENCOUNTER
"Requested Prescriptions   Pending Prescriptions Disp Refills     traZODone (DESYREL) 100 MG tablet [Pharmacy Med Name: TRAZODONE HCL 100MG TABS] 180 tablet 0     Sig: TAKE TWO TABLETS BY MOUTH AT BEDTIME   Last Written Prescription Date:  10/2/19  Last Fill Quantity: 180 tab,  # refills: 0   Last office visit: 8/12/2019 with prescribing provider:  Lashay Yin     Future Office Visit:        Serotonin Modulators Passed - 12/3/2019 12:15 PM        Passed - Recent (12 mo) or future (30 days) visit within the authorizing provider's specialty     Patient has had an office visit with the authorizing provider or a provider within the authorizing providers department within the previous 12 mos or has a future within next 30 days. See \"Patient Info\" tab in inbasket, or \"Choose Columns\" in Meds & Orders section of the refill encounter.              Passed - Medication is active on med list        Passed - Patient is age 18 or older          "

## 2019-12-03 NOTE — TELEPHONE ENCOUNTER
Routing refill request to provider for review/approval because: Patient needs to be seen because:  8/12/19 OV: Return in about 4 weeks (around 9/9/2019) for or sooner if symptoms persist or worsen.  Received #180 with 0 refills 10/2/19    Malathi ADAMS RN

## 2019-12-14 DIAGNOSIS — R25.2 BILATERAL LEG CRAMPS: ICD-10-CM

## 2019-12-14 DIAGNOSIS — G47.62 NOCTURNAL LEG CRAMPS: ICD-10-CM

## 2019-12-16 NOTE — TELEPHONE ENCOUNTER
Requested Prescriptions   Pending Prescriptions Disp Refills     gabapentin (NEURONTIN) 600 MG tablet [Pharmacy Med Name: GABAPENTIN  Last Written Prescription Date:  10/2/19  Last Fill Quantity: 90,  # refills: 1  Last office visit: 8/12/2019 with prescribing provider:  Lashay Yin  Future Office Visit:     600MG TABS] 90 tablet 0     Sig: TAKE ONE TABLET BY MOUTH AT BEDTIME AS NEEDED (RESTLESS LEG SYNDROME)       There is no refill protocol information for this order

## 2019-12-18 RX ORDER — GABAPENTIN 600 MG/1
TABLET ORAL
Qty: 90 TABLET | Refills: 0 | Status: SHIPPED | OUTPATIENT
Start: 2019-12-18 | End: 2020-03-16

## 2019-12-25 DIAGNOSIS — R25.2 BILATERAL LEG CRAMPS: ICD-10-CM

## 2019-12-25 DIAGNOSIS — G47.62 NOCTURNAL LEG CRAMPS: ICD-10-CM

## 2019-12-26 RX ORDER — GABAPENTIN 600 MG/1
TABLET ORAL
Qty: 90 TABLET | Refills: 0 | OUTPATIENT
Start: 2019-12-26

## 2019-12-26 NOTE — TELEPHONE ENCOUNTER
Requested Prescriptions   Pending Prescriptions Disp Refills     gabapentin (NEURONTIN) 600 MG tablet [Pharmacy Med Name: GABAPENTIN 600MG TABS] 90 tablet 0     Sig: TAKE ONE TABLET BY MOUTH AT BEDTIME AS NEEDED (RESTLESS LEG SYNDROME)       There is no refill protocol information for this order        Last Written Prescription Date:  12/18/19  Last Fill Quantity: 90,  # refills: 0   Last office visit: 8/12/2019 with prescribing provider:  Lashay Yin Office Visit:

## 2020-03-15 DIAGNOSIS — G47.62 NOCTURNAL LEG CRAMPS: ICD-10-CM

## 2020-03-15 DIAGNOSIS — R25.2 BILATERAL LEG CRAMPS: ICD-10-CM

## 2020-03-16 RX ORDER — GABAPENTIN 600 MG/1
TABLET ORAL
Qty: 90 TABLET | Refills: 0 | Status: SHIPPED | OUTPATIENT
Start: 2020-03-16 | End: 2020-05-19

## 2020-03-16 NOTE — TELEPHONE ENCOUNTER
Routing refill request to provider for review/approval because:  Drug not on the [facility] refill protocol   LOV:  8/12/2019    Morenita Finnegan RN

## 2020-03-16 NOTE — TELEPHONE ENCOUNTER
Requested Prescriptions   Pending Prescriptions Disp Refills     gabapentin (NEURONTIN) 600 MG tablet [Pharmacy Med Name: GABAPENTIN 600MG TABS] 90 tablet 0     Sig: TAKE ONE TABLET BY MOUTH AT BEDTIME AS NEEDED FOR RESTLESS LEG SYNDROME.       There is no refill protocol information for this order              Last Written Prescription Date:  12/18/2019  Last Fill Quantity: 90,   # refills: 0  Last Office Visit: 8/12/2019 with Humphrey   Future Office visit:       Routing refill request to provider for review/approval because:  Drug not on the [facility] refill protocol or controlled substance

## 2020-04-24 DIAGNOSIS — N52.9 IMPOTENCE OF ORGANIC ORIGIN: ICD-10-CM

## 2020-04-24 NOTE — TELEPHONE ENCOUNTER
"Requested Prescriptions   Pending Prescriptions Disp Refills     tadalafil (CIALIS) 20 MG tablet 6 tablet 12     Sig: Take 1 tablet (20 mg) by mouth daily as needed (erectile dysfunction)       Erectile Dysfuction Protocol Passed - 4/24/2020 12:00 PM        Passed - Absence of nitrates on medication list        Passed - Absence of Alpha Blockers on Med list        Passed - Recent (12 mo) or future (30 days) visit within the authorizing provider's specialty     Patient has had an office visit with the authorizing provider or a provider within the authorizing providers department within the previous 12 mos or has a future within next 30 days. See \"Patient Info\" tab in inbasket, or \"Choose Columns\" in Meds & Orders section of the refill encounter.              Passed - Medication is active on med list        Passed - Patient is age 18 or older           Last Written Prescription Date:  4/8/2019  Last Fill Quantity: 6,  # refills: 12   Last office visit: 8/12/2019 with prescribing provider:  Humphrey    Future Office Visit:      "

## 2020-04-27 RX ORDER — TADALAFIL 20 MG/1
20 TABLET ORAL DAILY PRN
Qty: 6 TABLET | Refills: 3 | Status: SHIPPED | OUTPATIENT
Start: 2020-04-27 | End: 2020-05-29

## 2020-04-27 NOTE — TELEPHONE ENCOUNTER
Prescription approved per [facility] [place].     Yadira SAUNDERS BSN, RN

## 2020-04-29 DIAGNOSIS — G47.00 PERSISTENT DISORDER OF INITIATING OR MAINTAINING SLEEP: ICD-10-CM

## 2020-04-30 RX ORDER — TRAZODONE HYDROCHLORIDE 100 MG/1
TABLET ORAL
Qty: 180 TABLET | Refills: 0 | Status: SHIPPED | OUTPATIENT
Start: 2020-04-30 | End: 2020-09-14

## 2020-04-30 NOTE — TELEPHONE ENCOUNTER
"Requested Prescriptions   Pending Prescriptions Disp Refills     traZODone (DESYREL) 100 MG tablet [Pharmacy Med Name: TRAZODONE HCL 100MG TABS] 180 tablet 0     Sig: TAKE TWO TABLETS BY MOUTH AT BEDTIME       Serotonin Modulators Passed - 4/29/2020 12:16 PM        Passed - Recent (12 mo) or future (30 days) visit within the authorizing provider's specialty     Patient has had an office visit with the authorizing provider or a provider within the authorizing providers department within the previous 12 mos or has a future within next 30 days. See \"Patient Info\" tab in inbasket, or \"Choose Columns\" in Meds & Orders section of the refill encounter.              Passed - Medication is active on med list        Passed - Patient is age 18 or older           Last Written Prescription Date:  2/4/2020  Last Fill Quantity: 180,  # refills: 0   Last office visit: 8/12/2019 with prescribing provider:  Humphrey    Future Office Visit:      "

## 2020-05-17 DIAGNOSIS — G47.62 NOCTURNAL LEG CRAMPS: ICD-10-CM

## 2020-05-17 DIAGNOSIS — R25.2 BILATERAL LEG CRAMPS: ICD-10-CM

## 2020-05-19 RX ORDER — GABAPENTIN 600 MG/1
TABLET ORAL
Qty: 90 TABLET | Refills: 0 | Status: SHIPPED | OUTPATIENT
Start: 2020-05-19 | End: 2020-05-29

## 2020-05-27 DIAGNOSIS — N52.9 IMPOTENCE OF ORGANIC ORIGIN: ICD-10-CM

## 2020-05-28 RX ORDER — TADALAFIL 20 MG/1
TABLET ORAL
Qty: 6 TABLET | Refills: 3 | OUTPATIENT
Start: 2020-05-28

## 2020-05-28 NOTE — TELEPHONE ENCOUNTER
"Requested Prescriptions   Pending Prescriptions Disp Refills     tadalafil (CIALIS) 20 MG tablet [Pharmacy Med Name: TADALAFIL 20MG TABS] 6 tablet 3     Sig: TAKE ONE TABLET BY MOUTH ONCE DAILY AS NEEDED FOR ERECTILE DYSFUNCTION       Erectile Dysfuction Protocol Passed - 5/27/2020  1:41 PM        Passed - Absence of nitrates on medication list        Passed - Absence of Alpha Blockers on Med list        Passed - Recent (12 mo) or future (30 days) visit within the authorizing provider's specialty     Patient has had an office visit with the authorizing provider or a provider within the authorizing providers department within the previous 12 mos or has a future within next 30 days. See \"Patient Info\" tab in inbasket, or \"Choose Columns\" in Meds & Orders section of the refill encounter.              Passed - Medication is active on med list        Passed - Patient is age 18 or older           Too soon, rx sent 4/27/20 for 6 tablets with 3 refills.   "

## 2020-05-29 ENCOUNTER — VIRTUAL VISIT (OUTPATIENT)
Dept: FAMILY MEDICINE | Facility: CLINIC | Age: 35
End: 2020-05-29
Payer: COMMERCIAL

## 2020-05-29 DIAGNOSIS — G47.62 NOCTURNAL LEG CRAMPS: ICD-10-CM

## 2020-05-29 DIAGNOSIS — N52.9 IMPOTENCE OF ORGANIC ORIGIN: ICD-10-CM

## 2020-05-29 DIAGNOSIS — R25.2 BILATERAL LEG CRAMPS: ICD-10-CM

## 2020-05-29 PROCEDURE — 99213 OFFICE O/P EST LOW 20 MIN: CPT | Mod: 95 | Performed by: NURSE PRACTITIONER

## 2020-05-29 RX ORDER — TADALAFIL 20 MG/1
20 TABLET ORAL DAILY PRN
Qty: 6 TABLET | Refills: 3 | Status: SHIPPED | OUTPATIENT
Start: 2020-05-29 | End: 2020-07-24

## 2020-05-29 RX ORDER — GABAPENTIN 600 MG/1
600 TABLET ORAL AT BEDTIME
Qty: 90 TABLET | Refills: 0 | Status: SHIPPED | OUTPATIENT
Start: 2020-05-29 | End: 2020-08-31

## 2020-05-29 NOTE — PROGRESS NOTES
"Germain Iraheta is a 34 year old male who is being evaluated via a billable telephone visit.      The patient has been notified of following:     \"This telephone visit will be conducted via a call between you and your physician/provider. We have found that certain health care needs can be provided without the need for a physical exam.  This service lets us provide the care you need with a short phone conversation.  If a prescription is necessary we can send it directly to your pharmacy.  If lab work is needed we can place an order for that and you can then stop by our lab to have the test done at a later time.    Telephone visits are billed at different rates depending on your insurance coverage. During this emergency period, for some insurers they may be billed the same as an in-person visit.  Please reach out to your insurance provider with any questions.    If during the course of the call the physician/provider feels a telephone visit is not appropriate, you will not be charged for this service.\"    Patient has given verbal consent for Telephone visit?  Yes    What phone number would you like to be contacted at? [phone]    How would you like to obtain your AVS? Mail a copy    Subjective     Germain Iraheta is a 34 year old male who presents via phone visit today for the following health issues:    HPI  Sleep medications and ED    How many servings of fruits and vegetables do you eat daily?  2-3    On average, how many sweetened beverages do you drink each day (Examples: soda, juice, sweet tea, etc.  Do NOT count diet or artificially sweetened beverages)?   0    How many days per week do you exercise enough to make your heart beat faster? 5    How many minutes a day do you exercise enough to make your heart beat faster? 9 or less    How many days per week do you miss taking your medication? 0             Patient Active Problem List   Diagnosis     CARDIOVASCULAR SCREENING; LDL GOAL LESS THAN 160     " Impotence of organic origin     Past Surgical History:   Procedure Laterality Date     PE TUBES         Social History     Tobacco Use     Smoking status: Current Every Day Smoker     Packs/day: 0.50     Years: 3.00     Pack years: 1.50     Types: Cigarettes     Smokeless tobacco: Never Used   Substance Use Topics     Alcohol use: Yes     Alcohol/week: 0.0 standard drinks     Comment: 5 drinks a month     Family History   Problem Relation Age of Onset     Diabetes Father      Unknown/Adopted Father      Cancer Father      Cancer Maternal Grandfather         lung     Hypertension No family hx of          Current Outpatient Medications   Medication Sig Dispense Refill     gabapentin (NEURONTIN) 600 MG tablet Take 1 tablet (600 mg) by mouth At Bedtime Needs follow up prior to further refills. 90 tablet 0     tadalafil (CIALIS) 20 MG tablet Take 1 tablet (20 mg) by mouth daily as needed (erectile dysfunction) 6 tablet 3     traZODone (DESYREL) 100 MG tablet TAKE TWO TABLETS BY MOUTH AT BEDTIME 180 tablet 0     Allergies   Allergen Reactions     Amoxicillin Rash     Ceclor [Cefaclor Monohydrate] Rash     Erythromycin Rash     Lorabid [Loracarbef] Rash     Penicillins Rash     BP Readings from Last 3 Encounters:   08/12/19 120/68   07/03/19 (!) 150/92   04/08/19 130/80    Wt Readings from Last 3 Encounters:   08/12/19 89.4 kg (197 lb)   04/08/19 96.6 kg (213 lb)   03/24/19 96.2 kg (212 lb)                    Reviewed and updated as needed this visit by Provider         Review of Systems   Constitutional, HEENT, cardiovascular, pulmonary, gi and gu systems are negative, except as otherwise noted.       Objective   Reported vitals:  There were no vitals taken for this visit.     PSYCH: Alert and oriented times 3; coherent speech, normal   rate and volume, able to articulate logical thoughts, able   to abstract reason, no tangential thoughts, no hallucinations   or delusions  His affect is normal  RESP: No cough, no audible  wheezing, able to talk in full sentences  Remainder of exam unable to be completed due to telephone visits    Diagnostic Test Results:  Labs reviewed in Epic  none         Assessment/Plan:  1. Impotence of organic origin    - tadalafil (CIALIS) 20 MG tablet; Take 1 tablet (20 mg) by mouth daily as needed (erectile dysfunction)  Dispense: 6 tablet; Refill: 3    2. Nocturnal leg cramps    - gabapentin (NEURONTIN) 600 MG tablet; Take 1 tablet (600 mg) by mouth At Bedtime Needs follow up prior to further refills.  Dispense: 90 tablet; Refill: 0    3. Bilateral leg cramps    - gabapentin (NEURONTIN) 600 MG tablet; Take 1 tablet (600 mg) by mouth At Bedtime Needs follow up prior to further refills.  Dispense: 90 tablet; Refill: 0    No follow-ups on file.  Patient Instructions   Continue same medications.  See you back in clinic for yearly check up and labs within the next 3 months or when COVID restrictions are lifted.          Phone call duration:  15 minutes    KRISTINA Jones CNP

## 2020-05-29 NOTE — PATIENT INSTRUCTIONS
Continue same medications.  See you back in clinic for yearly check up and labs within the next 3 months or when COVID restrictions are lifted.

## 2020-06-25 ENCOUNTER — TELEPHONE (OUTPATIENT)
Dept: FAMILY MEDICINE | Facility: CLINIC | Age: 35
End: 2020-06-25

## 2020-06-25 NOTE — TELEPHONE ENCOUNTER
"Per insurance company, they will not pay for his visit on 5/29 due to it being worded \"impotence of organic origin\" and needs the visit coded differently.      Advised patient to call central billing office for questions regarding his bill.      Gabriela Nieves RN    "

## 2020-06-25 NOTE — TELEPHONE ENCOUNTER
Patient called stating that insurance wont pay a recent OV on 05/29/2020. He is requesting np to change diagnosis code.     Jamee SOOD  Station

## 2020-07-23 DIAGNOSIS — N52.9 IMPOTENCE OF ORGANIC ORIGIN: ICD-10-CM

## 2020-07-24 RX ORDER — TADALAFIL 20 MG/1
20 TABLET ORAL DAILY PRN
Qty: 6 TABLET | Refills: 3 | Status: SHIPPED | OUTPATIENT
Start: 2020-07-24 | End: 2020-09-14

## 2020-07-24 NOTE — TELEPHONE ENCOUNTER
"Requested Prescriptions   Pending Prescriptions Disp Refills     tadalafil (CIALIS) 20 MG tablet 6 tablet 3     Sig: Take 1 tablet (20 mg) by mouth daily as needed (erectile dysfunction)       Erectile Dysfuction Protocol Passed - 7/23/2020  1:43 PM        Passed - Absence of nitrates on medication list        Passed - Absence of Alpha Blockers on Med list        Passed - Recent (12 mo) or future (30 days) visit within the authorizing provider's specialty     Patient has had an office visit with the authorizing provider or a provider within the authorizing providers department within the previous 12 mos or has a future within next 30 days. See \"Patient Info\" tab in inbasket, or \"Choose Columns\" in Meds & Orders section of the refill encounter.              Passed - Medication is active on med list        Passed - Patient is age 18 or older             "

## 2020-08-19 ENCOUNTER — HOSPITAL ENCOUNTER (EMERGENCY)
Facility: CLINIC | Age: 35
Discharge: HOME | End: 2020-08-19
Attending: EMERGENCY MEDICINE | Admitting: EMERGENCY MEDICINE
Payer: COMMERCIAL

## 2020-08-19 ENCOUNTER — APPOINTMENT (OUTPATIENT)
Dept: CT IMAGING | Facility: CLINIC | Age: 35
End: 2020-08-19
Attending: EMERGENCY MEDICINE
Payer: COMMERCIAL

## 2020-08-19 ENCOUNTER — NURSE TRIAGE (OUTPATIENT)
Dept: FAMILY MEDICINE | Facility: CLINIC | Age: 35
End: 2020-08-19

## 2020-08-19 VITALS
DIASTOLIC BLOOD PRESSURE: 87 MMHG | WEIGHT: 200 LBS | HEIGHT: 71 IN | OXYGEN SATURATION: 98 % | RESPIRATION RATE: 16 BRPM | SYSTOLIC BLOOD PRESSURE: 117 MMHG | HEART RATE: 63 BPM | TEMPERATURE: 97.9 F | BODY MASS INDEX: 28 KG/M2

## 2020-08-19 DIAGNOSIS — R10.31 ABDOMINAL PAIN, RIGHT LOWER QUADRANT: ICD-10-CM

## 2020-08-19 LAB
ALBUMIN SERPL-MCNC: 4 G/DL (ref 3.4–5)
ALBUMIN UR-MCNC: NEGATIVE MG/DL
ALP SERPL-CCNC: 61 U/L (ref 40–150)
ALT SERPL W P-5'-P-CCNC: 34 U/L (ref 0–70)
AMORPH CRY #/AREA URNS HPF: ABNORMAL /HPF
ANION GAP SERPL CALCULATED.3IONS-SCNC: 6 MMOL/L (ref 3–14)
APPEARANCE UR: ABNORMAL
AST SERPL W P-5'-P-CCNC: 35 U/L (ref 0–45)
BASOPHILS # BLD AUTO: 0.1 10E9/L (ref 0–0.2)
BASOPHILS NFR BLD AUTO: 1.2 %
BILIRUB SERPL-MCNC: 0.5 MG/DL (ref 0.2–1.3)
BILIRUB UR QL STRIP: NEGATIVE
BUN SERPL-MCNC: 19 MG/DL (ref 7–30)
CALCIUM SERPL-MCNC: 8.8 MG/DL (ref 8.5–10.1)
CHLORIDE SERPL-SCNC: 107 MMOL/L (ref 94–109)
CO2 SERPL-SCNC: 25 MMOL/L (ref 20–32)
COLOR UR AUTO: YELLOW
CREAT SERPL-MCNC: 0.84 MG/DL (ref 0.66–1.25)
DIFFERENTIAL METHOD BLD: NORMAL
EOSINOPHIL # BLD AUTO: 0.4 10E9/L (ref 0–0.7)
EOSINOPHIL NFR BLD AUTO: 6.6 %
ERYTHROCYTE [DISTWIDTH] IN BLOOD BY AUTOMATED COUNT: 11.8 % (ref 10–15)
GFR SERPL CREATININE-BSD FRML MDRD: >90 ML/MIN/{1.73_M2}
GLUCOSE SERPL-MCNC: 88 MG/DL (ref 70–99)
GLUCOSE UR STRIP-MCNC: NEGATIVE MG/DL
HCT VFR BLD AUTO: 43 % (ref 40–53)
HGB BLD-MCNC: 14.7 G/DL (ref 13.3–17.7)
HGB UR QL STRIP: NEGATIVE
IMM GRANULOCYTES # BLD: 0 10E9/L (ref 0–0.4)
IMM GRANULOCYTES NFR BLD: 0.3 %
KETONES UR STRIP-MCNC: NEGATIVE MG/DL
LEUKOCYTE ESTERASE UR QL STRIP: NEGATIVE
LYMPHOCYTES # BLD AUTO: 2.6 10E9/L (ref 0.8–5.3)
LYMPHOCYTES NFR BLD AUTO: 43.3 %
MCH RBC QN AUTO: 31.1 PG (ref 26.5–33)
MCHC RBC AUTO-ENTMCNC: 34.2 G/DL (ref 31.5–36.5)
MCV RBC AUTO: 91 FL (ref 78–100)
MONOCYTES # BLD AUTO: 0.4 10E9/L (ref 0–1.3)
MONOCYTES NFR BLD AUTO: 7.3 %
NEUTROPHILS # BLD AUTO: 2.5 10E9/L (ref 1.6–8.3)
NEUTROPHILS NFR BLD AUTO: 41.3 %
NITRATE UR QL: NEGATIVE
NRBC # BLD AUTO: 0 10*3/UL
NRBC BLD AUTO-RTO: 0 /100
PH UR STRIP: 7 PH (ref 5–7)
PLATELET # BLD AUTO: 232 10E9/L (ref 150–450)
POTASSIUM SERPL-SCNC: 4.3 MMOL/L (ref 3.4–5.3)
PROT SERPL-MCNC: 7.3 G/DL (ref 6.8–8.8)
RBC # BLD AUTO: 4.72 10E12/L (ref 4.4–5.9)
RBC #/AREA URNS AUTO: <1 /HPF (ref 0–2)
SODIUM SERPL-SCNC: 138 MMOL/L (ref 133–144)
SOURCE: ABNORMAL
SP GR UR STRIP: 1.02 (ref 1–1.03)
UROBILINOGEN UR STRIP-MCNC: 0 MG/DL (ref 0–2)
WBC # BLD AUTO: 6 10E9/L (ref 4–11)
WBC #/AREA URNS AUTO: 0 /HPF (ref 0–5)

## 2020-08-19 PROCEDURE — 85025 COMPLETE CBC W/AUTO DIFF WBC: CPT | Performed by: EMERGENCY MEDICINE

## 2020-08-19 PROCEDURE — 99285 EMERGENCY DEPT VISIT HI MDM: CPT | Mod: 25 | Performed by: EMERGENCY MEDICINE

## 2020-08-19 PROCEDURE — 80053 COMPREHEN METABOLIC PANEL: CPT | Performed by: EMERGENCY MEDICINE

## 2020-08-19 PROCEDURE — 25000125 ZZHC RX 250: Performed by: EMERGENCY MEDICINE

## 2020-08-19 PROCEDURE — 74177 CT ABD & PELVIS W/CONTRAST: CPT

## 2020-08-19 PROCEDURE — 99284 EMERGENCY DEPT VISIT MOD MDM: CPT | Mod: Z6 | Performed by: EMERGENCY MEDICINE

## 2020-08-19 PROCEDURE — 81001 URINALYSIS AUTO W/SCOPE: CPT | Performed by: EMERGENCY MEDICINE

## 2020-08-19 PROCEDURE — 25000128 H RX IP 250 OP 636: Performed by: EMERGENCY MEDICINE

## 2020-08-19 RX ORDER — IBUPROFEN 200 MG
800-1600 TABLET ORAL EVERY 4 HOURS PRN
COMMUNITY

## 2020-08-19 RX ORDER — IOPAMIDOL 755 MG/ML
97 INJECTION, SOLUTION INTRAVASCULAR ONCE
Status: COMPLETED | OUTPATIENT
Start: 2020-08-19 | End: 2020-08-19

## 2020-08-19 RX ADMIN — IOPAMIDOL 97 ML: 755 INJECTION, SOLUTION INTRAVENOUS at 19:21

## 2020-08-19 RX ADMIN — SODIUM CHLORIDE 64 ML: 9 INJECTION, SOLUTION INTRAVENOUS at 19:22

## 2020-08-19 ASSESSMENT — MIFFLIN-ST. JEOR: SCORE: 1859.32

## 2020-08-19 NOTE — ED AVS SNAPSHOT
[facility]  [address] [address]  Phone:  [phone]  Fax:  [phone]                                    Germain Iraheta   MRN: [id number]    Department:  [facility]   Date of Visit:  8/19/2020           After Visit Summary Signature Page    I have received my discharge instructions, and my questions have been answered. I have discussed any challenges I see with this plan with the nurse or doctor.    ..........................................................................................................................................  Patient/Patient Representative Signature      ..........................................................................................................................................  Patient Representative Print Name and Relationship to Patient    ..................................................               ................................................  Date                                   Time    ..........................................................................................................................................  Reviewed by Signature/Title    ...................................................              ..............................................  Date                                               Time          22EPIC Rev 08/18

## 2020-08-19 NOTE — ED PROVIDER NOTES
History     Chief Complaint   Patient presents with     Abdominal Pain     LRQ     HPI  Germain Iraheta is a 34 year old male with a history of insomnia restless leg syndrome who presents to the emergency department complaining of right-sided abdominal pain.  Patient states he has had some vague right lower quadrant abdominal pain over the past few months but woke up at 2 AM and had sudden onset of right-sided abdominal pain after stretching.  Pain was significantly sharp when initially began and patient has had pain in the right mid to lower quadrant since that time.  He denies any recent fevers or chills he has not had any headache or visual changes.  He is not any chest pain or shortness of breath.  He has had normal bowel movements but states it hurts his abdomen when he has bowel movement denies any urinary symptoms or blood in his urine.  He has not had any focal numbness weakness any extremity there is no bowel or bladder dysfunction.  He has not had a rash.  Denies any recent medication.  Currently rates his pain a 4 out of 10.    Allergies:  Allergies   Allergen Reactions     Amoxicillin Rash     Ceclor [Cefaclor Monohydrate] Rash     Erythromycin Rash     Lorabid [Loracarbef] Rash     Penicillins Rash       Problem List:    Patient Active Problem List    Diagnosis Date Noted     Impotence of organic origin 06/05/2013     Priority: Medium     CARDIOVASCULAR SCREENING; LDL GOAL LESS THAN 160 06/04/2013     Priority: Medium        Past Medical History:    Past Medical History:   Diagnosis Date     ED (erectile dysfunction)      Insomnia      RLS (restless legs syndrome)      Tobacco use disorder        Past Surgical History:    Past Surgical History:   Procedure Laterality Date     PE TUBES         Family History:    Family History   Problem Relation Age of Onset     Diabetes Father      Unknown/Adopted Father      Cancer Father      Cancer Maternal Grandfather         lung     Hypertension No family hx of  "       Social History:  Marital Status:  Single [1]  Social History     Tobacco Use     Smoking status: Current Every Day Smoker     Packs/day: 0.50     Years: 3.00     Pack years: 1.50     Types: Cigarettes     Smokeless tobacco: Never Used   Substance Use Topics     Alcohol use: Yes     Alcohol/week: 0.0 standard drinks     Comment: 5 drinks a month     Drug use: Not Currently     Comment: past use        Medications:    gabapentin (NEURONTIN) 600 MG tablet  tadalafil (CIALIS) 20 MG tablet  traZODone (DESYREL) 100 MG tablet          Review of Systems  All systems reviewed and other than pertinent positives and negatives in HPI all other systems are negative.  Physical Exam   BP: 128/77  Pulse: 65  Temp: 97.9  F (36.6  C)  Resp: 16  Height: 180.3 cm (5' 11\")  Weight: 90.7 kg (200 lb)  SpO2: 97 %      Physical Exam  Vitals signs and nursing note reviewed.   Constitutional:       General: He is not in acute distress.     Appearance: He is well-developed. He is not ill-appearing or toxic-appearing.   HENT:      Head: Normocephalic and atraumatic.      Nose: Nose normal.      Mouth/Throat:      Mouth: Mucous membranes are moist.   Eyes:      Conjunctiva/sclera: Conjunctivae normal.   Neck:      Musculoskeletal: Normal range of motion and neck supple. No muscular tenderness.   Cardiovascular:      Rate and Rhythm: Normal rate and regular rhythm.      Pulses: Normal pulses.      Heart sounds: Normal heart sounds. No murmur.   Pulmonary:      Effort: Pulmonary effort is normal.      Breath sounds: Normal breath sounds. No wheezing, rhonchi or rales.   Chest:      Chest wall: No tenderness.   Abdominal:      General: Abdomen is flat. Bowel sounds are normal.      Palpations: Abdomen is soft.      Comments: There is tenderness to palpation of the right lower rectus sheath in the right lower quadrant.  No erythema or swelling is noted.  There is no guarding or rebound bowel sounds are positive.   Musculoskeletal: Normal " range of motion.      Right lower leg: No edema.      Left lower leg: No edema.   Skin:     General: Skin is warm and dry.      Findings: No rash.   Neurological:      General: No focal deficit present.      Mental Status: He is alert and oriented to person, place, and time.      Motor: No weakness.      Coordination: Coordination normal.   Psychiatric:         Mood and Affect: Mood normal.         ED Course        Procedures               Critical Care time:  none               Results for orders placed or performed during the hospital encounter of 08/19/20 (from the past 24 hour(s))   Comprehensive metabolic panel   Result Value Ref Range    Sodium 138 133 - 144 mmol/L    Potassium 4.3 3.4 - 5.3 mmol/L    Chloride 107 94 - 109 mmol/L    Carbon Dioxide 25 20 - 32 mmol/L    Anion Gap 6 3 - 14 mmol/L    Glucose 88 70 - 99 mg/dL    Urea Nitrogen 19 7 - 30 mg/dL    Creatinine 0.84 0.66 - 1.25 mg/dL    GFR Estimate >90 >60 mL/min/[1.73_m2]    GFR Estimate If Black >90 >60 mL/min/[1.73_m2]    Calcium 8.8 8.5 - 10.1 mg/dL    Bilirubin Total 0.5 0.2 - 1.3 mg/dL    Albumin 4.0 3.4 - 5.0 g/dL    Protein Total 7.3 6.8 - 8.8 g/dL    Alkaline Phosphatase 61 40 - 150 U/L    ALT 34 0 - 70 U/L    AST 35 0 - 45 U/L   CBC with platelets differential   Result Value Ref Range    WBC 6.0 4.0 - 11.0 10e9/L    RBC Count 4.72 4.4 - 5.9 10e12/L    Hemoglobin 14.7 13.3 - 17.7 g/dL    Hematocrit 43.0 40.0 - 53.0 %    MCV 91 78 - 100 fl    MCH 31.1 26.5 - 33.0 pg    MCHC 34.2 31.5 - 36.5 g/dL    RDW 11.8 10.0 - 15.0 %    Platelet Count 232 150 - 450 10e9/L    Diff Method Automated Method     % Neutrophils 41.3 %    % Lymphocytes 43.3 %    % Monocytes 7.3 %    % Eosinophils 6.6 %    % Basophils 1.2 %    % Immature Granulocytes 0.3 %    Nucleated RBCs 0 0 /100    Absolute Neutrophil 2.5 1.6 - 8.3 10e9/L    Absolute Lymphocytes 2.6 0.8 - 5.3 10e9/L    Absolute Monocytes 0.4 0.0 - 1.3 10e9/L    Absolute Eosinophils 0.4 0.0 - 0.7 10e9/L     Absolute Basophils 0.1 0.0 - 0.2 10e9/L    Abs Immature Granulocytes 0.0 0 - 0.4 10e9/L    Absolute Nucleated RBC 0.0    UA reflex to Microscopic   Result Value Ref Range    Color Urine Yellow     Appearance Urine Cloudy     Glucose Urine Negative NEG^Negative mg/dL    Bilirubin Urine Negative NEG^Negative    Ketones Urine Negative NEG^Negative mg/dL    Specific Gravity Urine 1.018 1.003 - 1.035    Blood Urine Negative NEG^Negative    pH Urine 7.0 5.0 - 7.0 pH    Protein Albumin Urine Negative NEG^Negative mg/dL    Urobilinogen mg/dL 0.0 0.0 - 2.0 mg/dL    Nitrite Urine Negative NEG^Negative    Leukocyte Esterase Urine Negative NEG^Negative    Source Midstream Urine     RBC Urine <1 0 - 2 /HPF    WBC Urine 0 0 - 5 /HPF    Amorphous Crystals Few (A) NEG^Negative /HPF   CT Abdomen Pelvis w Contrast    Narrative    EXAM: CT ABDOMEN PELVIS W CONTRAST  LOCATION: [facility]  DATE/TIME: 8/19/2020 7:21 PM    INDICATION: Right lower quadrant abdominal pain  COMPARISON: None.  TECHNIQUE: CT scan of the abdomen and pelvis was performed following injection of IV contrast. Multiplanar reformats were obtained. Dose reduction techniques were used.  CONTRAST: 97 ml Isovue 370    FINDINGS:   LOWER CHEST: Normal.    HEPATOBILIARY: Normal.    PANCREAS: Normal.    SPLEEN: Normal.    ADRENAL GLANDS: Normal.    KIDNEYS/BLADDER: Normal.    BOWEL: No obstruction or inflammatory change. Normal appendix.    LYMPH NODES: Normal.    VASCULATURE: Unremarkable.    PELVIC ORGANS: Normal.    MUSCULOSKELETAL: Normal.      Impression    IMPRESSION:   1.  No evidence for appendicitis or other acute abnormality in the abdomen or pelvis.       Medications   iopamidol (ISOVUE-370) solution 97 mL (97 mLs Intravenous Given 8/19/20 1921)   sodium chloride 0.9 % bag 500mL for CT scan flush use (64 mLs As instructed Given 8/19/20 1922)       Assessments & Plan (with Medical Decision Making) records were reviewed.  Labs were obtained.  Patient's  UA, CBC and CMP without acute abnormality.  Findings discussed with patient.  Risks and benefits of CT scan of the abdomen and pelvis were discussed with the patient and he is in agreement the plan.  CT scan revealed no evidence of appendicitis, rectus sheath hematoma or other abnormality.  Findings again were discussed with patient in detail.  At this time I advised ibuprofen and Tylenol for pain gradually advancing diet and returning if any worsening symptoms.  Patient was in agreement this plan.     I have reviewed the nursing notes.    I have reviewed the findings, diagnosis, plan and need for follow up with the patient.       Discharge Medication List as of 8/19/2020  8:53 PM          Final diagnoses:   Abdominal pain, right lower quadrant       8/19/2020   [facility] EMERGENCY DEPARTMENT     Ervin Urbina MD  08/20/20 0561

## 2020-08-19 NOTE — TELEPHONE ENCOUNTER
"    Reason for Disposition    SEVERE abdominal pain (e.g., excruciating)    Additional Information    Negative: Passed out (i.e., fainted, collapsed and was not responding)    Negative: Shock suspected (e.g., cold/pale/clammy skin, too weak to stand, low BP, rapid pulse)    Negative: Sounds like a life-threatening emergency to the triager    Negative: Chest pain    Negative: Pain is mainly in upper abdomen (if needed ask: 'is it mainly above the belly button?')    Answer Assessment - Initial Assessment Questions  1. LOCATION: \"Where does it hurt?\"       Lower right side, it was off and on.  Much worse today  2. RADIATION: \"Does the pain shoot anywhere else?\" (e.g., chest, back)      no  3. ONSET: \"When did the pain begin?\" (Minutes, hours or days ago)       A few days ago, off and on.   However, woke up at 2 am this morning in severe pain.  Pt was crawling on the floor due to the pain.  Another severe episode of pain when he first got up this morning.  4. SUDDEN: \"Gradual or sudden onset?\"      Gradual over past few days.  Pt is increasing and worsening.  5. PATTERN \"Does the pain come and go, or is it constant?\"     - If constant: \"Is it getting better, staying the same, or worsening?\"       (Note: Constant means the pain never goes away completely; most serious pain is constant and it progresses)      - If intermittent: \"How long does it last?\" \"Do you have pain now?\"      (Note: Intermittent means the pain goes away completely between bouts)      Constant pain now.  6. SEVERITY: \"How bad is the pain?\"  (e.g., Scale 1-10; mild, moderate, or severe)     - MILD (1-3): doesn't interfere with normal activities, abdomen soft and not tender to touch      - MODERATE (4-7): interferes with normal activities or awakens from sleep, tender to touch      - SEVERE (8-10): excruciating pain, doubled over, unable to do any normal activities        severe  7. RECURRENT SYMPTOM: \"Have you ever had this type of abdominal pain " "before?\" If so, ask: \"When was the last time?\" and \"What happened that time?\"       no  8. CAUSE: \"What do you think is causing the abdominal pain?\"      Appendix?  9. RELIEVING/AGGRAVATING FACTORS: \"What makes it better or worse?\" (e.g., movement, antacids, bowel movement)      nothing  10. OTHER SYMPTOMS: \"Has there been any vomiting, diarrhea, constipation, or urine problems?\"        Denies other symptoms.  No fever.    Protocols used: ABDOMINAL PAIN - MALE-A-OH      "

## 2020-08-19 NOTE — ED NOTES
Medication List updated.    Patient states he took 500 mg of Trazodone last night.    List updated

## 2020-08-20 NOTE — DISCHARGE INSTRUCTIONS
Turn if symptoms worsen or new symptoms develop.  No obvious source of abdominal pain was noted on CT scan.  Labs without acute abnormality.  If increased abdominal pain vomiting blood in your stool decreased urine output or other symptoms present please return for recheck.

## 2020-09-13 DIAGNOSIS — N52.9 IMPOTENCE OF ORGANIC ORIGIN: ICD-10-CM

## 2020-09-13 DIAGNOSIS — G47.00 PERSISTENT DISORDER OF INITIATING OR MAINTAINING SLEEP: ICD-10-CM

## 2020-09-14 RX ORDER — TRAZODONE HYDROCHLORIDE 100 MG/1
TABLET ORAL
Qty: 180 TABLET | Refills: 0 | Status: SHIPPED | OUTPATIENT
Start: 2020-09-14 | End: 2021-02-22

## 2020-09-14 RX ORDER — TADALAFIL 20 MG/1
TABLET ORAL
Qty: 6 TABLET | Refills: 3 | Status: SHIPPED | OUTPATIENT
Start: 2020-09-14 | End: 2020-11-02

## 2020-09-14 NOTE — TELEPHONE ENCOUNTER
"Requested Prescriptions   Pending Prescriptions Disp Refills     tadalafil (CIALIS) 20 MG tablet [Pharmacy Med Name: TADALAFIL 20MG TABS] 6 tablet 3     Sig: TAKE ONE TABLET BY MOUTH ONCE DAILY AS NEEDED FOR ERECTILE DYSFUNCTION       Erectile Dysfuction Protocol Passed - 9/13/2020 12:06 PM        Passed - Absence of nitrates on medication list        Passed - Absence of Alpha Blockers on Med list        Passed - Recent (12 mo) or future (30 days) visit within the authorizing provider's specialty     Patient has had an office visit with the authorizing provider or a provider within the authorizing providers department within the previous 12 mos or has a future within next 30 days. See \"Patient Info\" tab in inbasket, or \"Choose Columns\" in Meds & Orders section of the refill encounter.              Passed - Medication is active on med list        Passed - Patient is age 18 or older           traZODone (DESYREL) 100 MG tablet [Pharmacy Med Name: TRAZODONE HCL 100MG TABS] 180 tablet 0     Sig: TAKE TWO TABLETS BY MOUTH AT BEDTIME       Serotonin Modulators Passed - 9/13/2020 12:06 PM        Passed - Recent (12 mo) or future (30 days) visit within the authorizing provider's specialty     Patient has had an office visit with the authorizing provider or a provider within the authorizing providers department within the previous 12 mos or has a future within next 30 days. See \"Patient Info\" tab in inbasket, or \"Choose Columns\" in Meds & Orders section of the refill encounter.              Passed - Medication is active on med list        Passed - Patient is age 18 or older             "

## 2020-09-29 ENCOUNTER — OFFICE VISIT (OUTPATIENT)
Dept: FAMILY MEDICINE | Facility: CLINIC | Age: 35
End: 2020-09-29
Payer: COMMERCIAL

## 2020-09-29 VITALS
RESPIRATION RATE: 16 BRPM | OXYGEN SATURATION: 97 % | HEIGHT: 71 IN | HEART RATE: 67 BPM | SYSTOLIC BLOOD PRESSURE: 130 MMHG | BODY MASS INDEX: 27.89 KG/M2 | DIASTOLIC BLOOD PRESSURE: 74 MMHG | TEMPERATURE: 98.6 F

## 2020-09-29 DIAGNOSIS — H65.91 OME (OTITIS MEDIA WITH EFFUSION), RIGHT: Primary | ICD-10-CM

## 2020-09-29 DIAGNOSIS — H72.91 PERFORATED EAR DRUM, RIGHT: ICD-10-CM

## 2020-09-29 PROCEDURE — 99214 OFFICE O/P EST MOD 30 MIN: CPT | Performed by: NURSE PRACTITIONER

## 2020-09-29 RX ORDER — DOXYCYCLINE HYCLATE 100 MG
100 TABLET ORAL 2 TIMES DAILY
Qty: 20 TABLET | Refills: 0 | Status: SHIPPED | OUTPATIENT
Start: 2020-09-29 | End: 2020-10-09

## 2020-09-29 NOTE — PROGRESS NOTES
"Germain Iraheta is a 34 year old male who presents to clinic today for the following health issues:    HPI     Concern - Ear Pain  Onset: 1 week ago  Description: right ear   Intensity: moderate  Progression of Symptoms:  worsening and constant  Accompanying Signs & Symptoms: sore throat, runny nose, jaw pain   Previous history of similar problem: none  Precipitating factors:        Worsened by: debrox drops, laying on left side  Alleviating factors:        Improved by: laying on his ear improves   Therapies tried and outcome: OTC drops, put one drop in ear and fell to his knees. Ibuprofen helps some.      He had some dizziness when it occurred but that is resolved.  He does have some congestion symptoms that are mild.  His hearing is bad in the ears due to hx of ear issues.  Patient is unable to handle ear plugs at work in his ears.    Review of Systems   CONSTITUTIONAL: NEGATIVE for fever, chills, change in weight  ENT/MOUTH: POSITIVE for ear pain right, mild nasal congestion/rhinorrhea and mild sore throat  RESP: NEGATIVE for significant cough or SOB  CV: NEGATIVE for chest pain, palpitations or peripheral edema  PSYCHIATRIC: NEGATIVE for changes in mood or affect  ROS otherwise negative      Objective    /74   Pulse 67   Temp 98.6  F (37  C) (Tympanic)   Resp 16   Ht 1.803 m (5' 11\")   SpO2 97%   BMI 27.89 kg/m    Body mass index is 27.89 kg/m .  Physical Exam   GENERAL: healthy, alert and no distress  HENT: normal cephalic/atraumatic, right ear: erythematous, bulging membrane, mucopurulent effusion and perforation in posterior TM, left ear: occluded with wax, nose and mouth without ulcers or lesions, oropharynx clear, oral mucous membranes moist and sinuses: not tender  NECK: no adenopathy, no asymmetry, masses, or scars and thyroid normal to palpation  RESP: lungs clear to auscultation - no rales, rhonchi or wheezes  CV: regular rate and rhythm, normal S1 S2, no S3 or S4, no murmur, click or rub, " no peripheral edema and peripheral pulses strong  PSYCH: mentation appears normal, affect normal/bright        Assessment & Plan     OME (otitis media with effusion), right  Due to perforated TM, treating with doxycycline for 10 days due to allergies to PCN and cephalosporins in the past.  Recommend tylenol/ibuprofen for pain as needed and nothing in the ear. Discussed that he should stay home from work until able to use ear plugs due to loud noises at work and letter given.  Recommend follow-up in 1 week if any persistent or worsening symptoms despite treatment.  Would recommend ENT if persistent.  - doxycycline hyclate (VIBRA-TABS) 100 MG tablet; Take 1 tablet (100 mg) by mouth 2 times daily for 10 days    Perforated ear drum, right  - doxycycline hyclate (VIBRA-TABS) 100 MG tablet; Take 1 tablet (100 mg) by mouth 2 times daily for 10 days     See Patient Instructions    Return in about 1 week (around 10/6/2020), or if symptoms worsen or fail to improve.    Noreen Turner NP  [facility]

## 2020-09-29 NOTE — PATIENT INSTRUCTIONS
1.  Nothing in the ear, protect it in the shower so no water gets into the ear canal on the right ear.  2.  Take antibiotic as directed.  3.  Follow-up in clinic if any persistent or worsening symtpoms.  4.  Tylenol/ibuprofen as needed for pain.      Patient Education     Eardrum Rupture (Perforation)    Your eardrum is a thin membrane between your outer and middle ear. Sound waves entering your ear cause the membrane to vibrate. This helps you hear. An injury or infection can cause your eardrum to tear (rupture). This creates a hole (perforation) that may affect your hearing.  Causes of eardrum perforation  Causes of a ruptured eardrum include:    Pressure from an ear infection    Putting an object such as a cotton swab or pencil into the ear    A very loud noise such as a gunshot close to the ear    Rapid changes in air pressure. These can happen during scuba diving or traveling at high altitudes.    A slap or blow to the ear  When to go to the emergency room (ER)  Seek medical care right away if you:    Have severe pain, bleeding, or ringing in your ear.    Lose your hearing suddenly.    Become very dizzy for no reason.    Have an object lodged in your ear.  A ruptured eardrum from an ear infection usually isn't an emergency. In fact, the rupture often relieves pressure and pain. It usually heals within hours or days. But you should have the ear looked at by a healthcare provider within 24 hours.  What to expect in the ER  Your ear will be examined. Treatment will depend on how severe the damage is. Small holes often heal on their own. A small patch may be placed over a minor eardrum tear. Large tears may need to be repaired during an operation. If you are very dizzy or have severe hearing loss, you are likely to stay in the hospital for treatment for one or more days.  Don't clean inside the ear canal with cotton swabs or any other object.  Date Last Reviewed: 10/1/2016    1184-4696 [employer]  [address] [place] [address]. All rights reserved. This information is not intended as a substitute for professional medical care. Always follow your healthcare professional's instructions.

## 2020-09-29 NOTE — LETTER
[facility]  [address] [facility] [address]  Phone: [phone]    September 29, 2020        Germain Iraheta  [address] [facility] [address]          To whom it may concern:    RE: Germain Iraheta    Patient was seen and treated today at our clinic and missed work.  He will need to remain out of work until ear pain improves and he is able to wear ear plugs again for work.  This should be no more than 2-3 days.    Please contact me for questions or concerns.      Sincerely,        Noreen Turner NP

## 2020-10-23 ENCOUNTER — OFFICE VISIT (OUTPATIENT)
Dept: FAMILY MEDICINE | Facility: CLINIC | Age: 35
End: 2020-10-23
Payer: COMMERCIAL

## 2020-10-23 VITALS
SYSTOLIC BLOOD PRESSURE: 120 MMHG | DIASTOLIC BLOOD PRESSURE: 70 MMHG | BODY MASS INDEX: 29.01 KG/M2 | WEIGHT: 208 LBS | OXYGEN SATURATION: 98 % | TEMPERATURE: 97.9 F | RESPIRATION RATE: 16 BRPM | HEART RATE: 72 BPM

## 2020-10-23 DIAGNOSIS — H92.01 OTALGIA, RIGHT: ICD-10-CM

## 2020-10-23 DIAGNOSIS — M72.2 PLANTAR FASCIITIS: Primary | ICD-10-CM

## 2020-10-23 PROCEDURE — 99214 OFFICE O/P EST MOD 30 MIN: CPT | Performed by: NURSE PRACTITIONER

## 2020-10-23 NOTE — PROGRESS NOTES
CC: Right ear discomfort and Left heel pain    Subjective:   HPI: Mr. Iraheta is a 34 year old male with PMH of R ear TM perforation who presents with persistent R ear discomfort and L foot pain. He took the Doxycycline as prescribed in September and does not feel there was total resolution of the discomfort. 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 He also uses ear protection at work and has switched from in ear plugs to ear muffs due to the discomfort. Also reports congestion of R nostril and sinus. Denies fever/chills, ear or nose drainage, headaches, vision changes, dizziness or vertigo.     Patient's past medical history, problem list, family history, surgical history, medications and allergies were reviewed.     ROS:   Eyes: negative, visual blurring, double vision, eye pain  Ears/Nose/Throat: positive for nasal congestion, earache (R), hearing loss (since TM perf)  Respiratory: No shortness of breath and No cough  Neurologic: negative for headache or vertigo  Hematologic/Lymphatic/Immunologic: negative for, chills and fever    Objective:  Exam:  Constitutional: healthy, alert and no distress  Head: Normocephalic. No masses, lesions, tenderness or abnormalities  ENT: nasal mucosa congested, R ear canal erythremic,TM unable to view  Respiratory: Breathing unlabored  Skin: no suspicious lesions or rashes on visible skin.  Neurologic: Gait normal. Grossly intact.  Psychiatric: mentation appears normal and affect normal/bright    Assessment/Plan:  Plantar fasciitis  (primary encounter diagnosis)  Comment: Left foot heel/arch pain, worse in the morning.   Plan:   - Advised offloading heel if possible.  - NSAID such as Ibuprofen 200-400 mg Q8 hrs for symptomatic relief  - Given plantar fascitis stretching sheet for use at home.   - If persistent would recommend podiatry consult for custom shoe insert.      Otalgia, left  Comment: Right  ear TM perforation treated with Doxy 9/29/20. Irritation remains persistent.  Plan:   - OTOLARYNGOLOGY REFERRAL    Advised to return to clinic if sx worsen or not resolved after treatment.            In addition to the above assessment and plan each active problem on the patient's problem list was evaluated today. This included the questioning of the patient for any medication problems. We will continue the current treatment plan for these active problems except as noted.    Brianna Rosario, RN-BSN, DNP-Student

## 2020-10-23 NOTE — PATIENT INSTRUCTIONS
ENT referral is placed, please make an appointment with them.  If foot pain is not improving, let us know and we can send you to podiatry.

## 2020-10-23 NOTE — PROGRESS NOTES
Subjective     Germain Iraheta is a 34 year old male who presents to clinic today for the following health issues:    HPI         Chief Complaint   Patient presents with     Ear Problem     Musculoskeletal Problem     Spur           Flu shot: Declined       Acute Illness  Acute illness concerns: Ear problem  Onset/Duration: follow up  Symptoms:  Fever: no  Chills/Sweats: no  Headache (location?): no  Sinus Pressure: YES  Conjunctivitis:  no  Ear Pain: YES: right  Rhinorrhea: no  Congestion: no  Sore Throat: no  Cough: no  Wheeze: no  Decreased Appetite: no  Nausea: no  Vomiting: no  Diarrhea: no  Dysuria/Freq.: no  Dysuria or Hematuria: no  Fatigue/Achiness: no  Sick/Strep Exposure: no  Therapies tried and outcome: None      Musculoskeletal problem/pain  Onset/Duration: Follow up  Description  Location: foot - left  Joint Swelling: YES  Redness: no  Pain: YES- 2/10 current - when walks 10/10   Warmth: no   Intensity:  moderate  Progression of Symptoms:  same and constant  Accompanying signs and symptoms:   Fevers: no  Numbness/tingling/weakness: YES- weakness   History  Trauma to the area: no  Recent illness:  no  Previous similar problem: YES  Previous evaluation:  YES  Precipitating or alleviating factors:  Aggravating factors include: none  Therapies tried and outcome: nothing    Above HPI reviewed. Additionally, seen on 9/29 with perforated right TM, OME, was treated with 10 days of doxycycline. Pain has never resolved. Unable to use ear plugs at work. No otorrhea.    Foot pain worst in the morning, does improve as day goes on. Reports has been previously told he has a heel spur. Review of that xray and radiology read are normal.    Review of Systems   Constitutional, HEENT, cardiovascular, pulmonary, gi and gu systems are negative, except as otherwise noted.      Objective    /70   Pulse 72   Temp 97.9  F (36.6  C) (Tympanic)   Resp 16   Wt 94.3 kg (208 lb)   SpO2 98%   BMI 29.01 kg/m    Body mass  index is 29.01 kg/m .  Physical Exam  Vitals signs and nursing note reviewed.   Constitutional:       Appearance: Normal appearance.   HENT:      Head: Normocephalic and atraumatic.      Right Ear: Ear canal normal. A middle ear effusion is present.      Left Ear: Tympanic membrane and ear canal normal.      Ears:      Comments: Right TM cloudy, difficult to fully visualize due to discomfort      Nose: Nose normal. No rhinorrhea.      Right Sinus: No maxillary sinus tenderness or frontal sinus tenderness.      Left Sinus: No maxillary sinus tenderness or frontal sinus tenderness.      Mouth/Throat:      Lips: Pink.      Mouth: Mucous membranes are moist.      Pharynx: Oropharynx is clear.   Eyes:      General: Lids are normal.      Conjunctiva/sclera: Conjunctivae normal.      Comments: Non icteric   Neck:      Musculoskeletal: Neck supple.   Cardiovascular:      Rate and Rhythm: Normal rate and regular rhythm.      Pulses: Normal pulses.      Heart sounds: Normal heart sounds, S1 normal and S2 normal.   Pulmonary:      Effort: Pulmonary effort is normal.      Breath sounds: Normal breath sounds and air entry.   Feet:      Comments: Left heel, plantar fascia TTP, increases with dorsiflexion of great toe  Lymphadenopathy:      Cervical: No cervical adenopathy.   Skin:     General: Skin is warm and dry.   Neurological:      General: No focal deficit present.      Mental Status: He is alert and oriented to person, place, and time.   Psychiatric:         Mood and Affect: Mood normal.         Behavior: Behavior normal.         Thought Content: Thought content normal.         Judgment: Judgment normal.                  Assessment & Plan     Plantar fasciitis  History and exam are consistent with plantar fasciitis. Handout given, advised NSAIDs, icing, shoe inserts.    Otalgia, right  Does not appear to be infected at this point, however he has ongoing pain. Referred to ENT for evaluation.  - OTOLARYNGOLOGY REFERRAL    "  Tobacco Cessation:   reports that he has been smoking cigarettes. He has a 1.50 pack-year smoking history. He has never used smokeless tobacco.        BMI:   Estimated body mass index is 29.01 kg/m  as calculated from the following:    Height as of 9/29/20: 1.803 m (5' 11\").    Weight as of this encounter: 94.3 kg (208 lb).   Weight management plan: Discussed healthy diet and exercise guidelines         Patient Instructions   ENT referral is placed, please make an appointment with them.  If foot pain is not improving, let us know and we can send you to podiatry.      Return in about 1 month (around 11/23/2020) for worsening or continued symptoms.    KRISTINA Armando [facility]    "

## 2020-10-26 ENCOUNTER — OFFICE VISIT (OUTPATIENT)
Dept: OTOLARYNGOLOGY | Facility: CLINIC | Age: 35
End: 2020-10-26
Attending: NURSE PRACTITIONER
Payer: COMMERCIAL

## 2020-10-26 ENCOUNTER — OFFICE VISIT (OUTPATIENT)
Dept: AUDIOLOGY | Facility: CLINIC | Age: 35
End: 2020-10-26
Attending: NURSE PRACTITIONER
Payer: COMMERCIAL

## 2020-10-26 VITALS
WEIGHT: 208 LBS | TEMPERATURE: 98.2 F | SYSTOLIC BLOOD PRESSURE: 123 MMHG | HEART RATE: 81 BPM | DIASTOLIC BLOOD PRESSURE: 77 MMHG | BODY MASS INDEX: 29.01 KG/M2

## 2020-10-26 DIAGNOSIS — H73.891 TYMPANIC MEMBRANE RETRACTION, RIGHT: ICD-10-CM

## 2020-10-26 DIAGNOSIS — Z96.22 HISTORY OF PLACEMENT OF EAR TUBES: ICD-10-CM

## 2020-10-26 DIAGNOSIS — H61.22 IMPACTED CERUMEN OF LEFT EAR: ICD-10-CM

## 2020-10-26 DIAGNOSIS — H90.11 CONDUCTIVE HEARING LOSS OF RIGHT EAR WITH UNRESTRICTED HEARING OF LEFT EAR: ICD-10-CM

## 2020-10-26 DIAGNOSIS — H69.91 CHRONIC EUSTACHIAN TUBE DYSFUNCTION, RIGHT: Primary | ICD-10-CM

## 2020-10-26 DIAGNOSIS — H92.01 RIGHT EAR PAIN: Primary | ICD-10-CM

## 2020-10-26 PROCEDURE — 99243 OFF/OP CNSLTJ NEW/EST LOW 30: CPT | Mod: 25 | Performed by: OTOLARYNGOLOGY

## 2020-10-26 PROCEDURE — 69210 REMOVE IMPACTED EAR WAX UNI: CPT | Mod: LT | Performed by: OTOLARYNGOLOGY

## 2020-10-26 PROCEDURE — 99207 PR NO CHARGE LOS: CPT | Performed by: AUDIOLOGIST

## 2020-10-26 RX ORDER — CIPROFLOXACIN AND DEXAMETHASONE 3; 1 MG/ML; MG/ML
SUSPENSION/ DROPS AURICULAR (OTIC)
Qty: 10 ML | Refills: 3 | Status: SHIPPED | OUTPATIENT
Start: 2020-10-26 | End: 2021-01-04

## 2020-10-26 ASSESSMENT — PAIN SCALES - GENERAL: PAINLEVEL: MILD PAIN (2)

## 2020-10-26 NOTE — PROGRESS NOTES
AUDIOLOGY REPORT    SUBJECTIVE:  Germain Iraheta is a 34 year old male who was [facility], referred by Dr. Meza.  No previous audiograms are available at today's appointment.  The patient reports right ear otalgia. He also reports any vibration, loud noise or air is also painful in the right ear. The patient denies bilateral drainage.     OBJECTIVE:    Otoscopic exam indicates debris in the right ear canal. Patient reports pain with any manipulation of the ear.     After medical evaluation by Dr. Meza the hearing evaluation was deferrd.       ASSESSMENT:  Right ear pain      PLAN: It is recommended that the patient be seen by Dr. Meza for medical evaluation of their ears and hearing evaluation. Please call this clinic with questions regarding these results or recommendations.        Carol Ruiz M.A. LIB[facility]AAA  Clinical audiologist [place] # [id number]  10/26/2020

## 2020-10-26 NOTE — LETTER
10/26/2020         RE: Germain Iraheta  [address] [facility] [address]        Dear Colleague,    Thank you for referring your patient, Germain Iraheta, to the [facility]. Please see a copy of my visit note below.    Chief Complaint   Patient presents with     Ear Problem     seen on 9/29/20 for right ear pain- told he had perforated TM and infection was on oral antibiotics for 10 days- seen on 10/23 and told ear still red and inflamed and to go to ENT     History of Present Illness  Germain Iraheta is a 34 year old male who presents to today for ear evaluation.  I am seeing this patient in consultation for right otalgia at the request of the provider Isidra Owens CNP.  Patient reports developing ear pain and decreased hearing at the end of September.  He was placed on oral antibiotics.  He denies any problems with otorrhea.  He had ear tubes as a child but no other ear surgery history.  He feels like he hears better out of his left ear.  No significant dizziness or vertigo.  He was seen back for follow-up and I thought his ear canal still looked inflamed.  He was referred for ENT evaluation.  Audiology looked in his ear and felt like he is ear canal/eardrum looked abnormal.  He also had a cerumen impaction in his left ear.    Past Medical History  Patient Active Problem List   Diagnosis     CARDIOVASCULAR SCREENING; LDL GOAL LESS THAN 160     Impotence of organic origin     Current Medications     Current Outpatient Medications:      ciprofloxacin-dexamethasone (CIPRODEX) 0.3-0.1 % otic suspension, Place 5 drops in right ear twice daily for 7 days., Disp: 10 mL, Rfl: 3     gabapentin (NEURONTIN) 600 MG tablet, TAKE ONE TABLET BY MOUTH EVERY NIGHT AT BEDTIME (NEED TO BE SEEN IN CLINIC FOR FURTHER REFILLS), Disp: 30 tablet, Rfl: 0     traZODone (DESYREL) 100 MG tablet, TAKE TWO TABLETS BY MOUTH AT BEDTIME, Disp: 180 tablet, Rfl: 0     ibuprofen (ADVIL/MOTRIN) 200 MG tablet, Take  800-1,600 mg by mouth every 4 hours as needed for mild pain, Disp: , Rfl:      tadalafil (CIALIS) 20 MG tablet, TAKE ONE TABLET BY MOUTH ONCE DAILY AS NEEDED FOR ERECTILE DYSFUNCTION (Patient not taking: Reported on 10/23/2020), Disp: 6 tablet, Rfl: 3    Allergies  Allergies   Allergen Reactions     Amoxicillin Rash     Ceclor [Cefaclor Monohydrate] Rash     Erythromycin Rash     Lorabid [Loracarbef] Rash     Penicillins Rash       Social History   Social History     Socioeconomic History     Marital status: Single     Spouse name: Not on file     Number of children: Not on file     Years of education: Not on file     Highest education level: Not on file   Occupational History     Occupation: [employer]     Employer: other   Social Needs     Financial resource strain: Not on file     Food insecurity     Worry: Not on file     Inability: Not on file     Transportation needs     Medical: Not on file     Non-medical: Not on file   Tobacco Use     Smoking status: Current Every Day Smoker     Packs/day: 0.50     Years: 3.00     Pack years: 1.50     Types: Cigarettes     Smokeless tobacco: Never Used   Substance and Sexual Activity     Alcohol use: Yes     Alcohol/week: 0.0 standard drinks     Comment: 5 drinks a month     Drug use: Not Currently     Comment: past use     Sexual activity: Yes     Partners: Female   Lifestyle     Physical activity     Days per week: Not on file     Minutes per session: Not on file     Stress: Not on file   Relationships     Social connections     Talks on phone: Not on file     Gets together: Not on file     Attends Jainism service: Not on file     Active member of club or organization: Not on file     Attends meetings of clubs or organizations: Not on file     Relationship status: Not on file     Intimate partner violence     Fear of current or ex partner: Not on file     Emotionally abused: Not on file     Physically abused: Not on file     Forced sexual activity: Not on  file   Other Topics Concern     Parent/sibling w/ CABG, MI or angioplasty before 65F 55M? No   Social History Narrative     Not on file       Family History  Family History   Problem Relation Age of Onset     Diabetes Father      Cancer Maternal Grandfather         lung     Hypertension No family hx of        Review of Systems  As per HPI and PMHx, otherwise 10+ comprehensive system review is negative.    Physical Exam  /77 (BP Location: Left arm, Patient Position: Chair, Cuff Size: Adult Large)   Pulse 81   Temp 98.2  F (36.8  C) (Tympanic)   Wt 94.3 kg (208 lb)   BMI 29.01 kg/m    GENERAL: Patient is a pleasant, cooperative 34 year old male in no acute distress.  HEAD: Normocephalic, atraumatic.  Hair and scalp are normal.  EYES: Pupils are equal, round, reactive to light and accommodation.  Extraocular movements are intact.  The sclera nonicteric without injection.  The extraocular structures are normal.  EARS: See below.   NEUROLOGIC: Cranial nerves II through XII are grossly intact.  Voice is strong.  Patient is House-Brackmann I/VI bilaterally.  CARDIOVASCULAR: Extremities are warm and well-perfused.  No significant peripheral edema.  RESPIRATORY: Patient has nonlabored breathing without cough, wheeze, stridor.  PSYCHIATRIC: Patient is alert and oriented.  Mood and affect appear normal.  SKIN: Warm and dry.  No scalp, face, or neck lesions noted.    Physical Exam and Procedure    EARS: Normal shape and symmetry.  No tenderness when palpating the mastoid or tragal areas bilaterally.  The ears were then examined under the otic binocular microscope to perform cerumen removal.  Otoscopic exam on the right reveals a clear canal.  There is a slight amount of erythema of the ear canal.  Patient has severe tympanic membrane retraction with an incudostapedial pexy and some erosion of the lateral process of the malleus.  There is a central tympanic membrane perforation.  There is no granulation or obvious  drainage.  There is no luz cholesteatoma.  The tympanic membrane is retracted onto the promontory.      Attention was turned to the left ear.  Otoscopic exam on the left reveals impacted cerumen down to the level of the tympanic membrane.  The cerumen was removed with an alligator forceps.  The left tympanic membrane was round, intact without evidence of effusion.  No retraction, granulation, drainage, or evidence of cholesteatoma.      Tuning fork examination reveals a Reynoso that lateralizes to the right ear.  Air conduction is greater than bone conduction on the left, bone conduction is greater than air conduction on the right.    Assessment and Plan     ICD-10-CM    1. Chronic Eustachian tube dysfunction, right  H69.81 ciprofloxacin-dexamethasone (CIPRODEX) 0.3-0.1 % otic suspension     OTOLARYNGOLOGY REFERRAL     Remove Cerumen   2. Conductive hearing loss of right ear with unrestricted hearing of left ear  H90.11 ciprofloxacin-dexamethasone (CIPRODEX) 0.3-0.1 % otic suspension     OTOLARYNGOLOGY REFERRAL     Remove Cerumen   3. Tympanic membrane retraction, right  H73.891 ciprofloxacin-dexamethasone (CIPRODEX) 0.3-0.1 % otic suspension     OTOLARYNGOLOGY REFERRAL     Remove Cerumen   4. History of placement of ear tubes  Z96.22 ciprofloxacin-dexamethasone (CIPRODEX) 0.3-0.1 % otic suspension     OTOLARYNGOLOGY REFERRAL     Remove Cerumen   5. Impacted cerumen of left ear  H61.22 ciprofloxacin-dexamethasone (CIPRODEX) 0.3-0.1 % otic suspension     OTOLARYNGOLOGY REFERRAL     Remove Cerumen     It was my pleasure seeing Germain Iraheta today in clinic.  The patient has a severe right tympanic membrane retraction with incudostapedial pexy consistent with chronic middle ear disease and a right-sided tympanic membrane perforation.  He is having some ear discomfort and a slight amount of canal erythema.  We will treat him with Ciprodex drops 5 drops twice a day for 7 days.  I will refer the patient to the  [facility] for evaluation and management of his right chronic middle ear disease.  He likely will require tympanoplasty tympanomastoidectomy with likely cartilage graft tympanoplasty with or without ventilation tube placement give him a safe and to help his hearing.  We discussed not placing anything in his ears except for eardrops.  We discussed dry ear precautions.    Mark Meza MD  Department of Otolarygology-Head and Neck Surgery  [facility]         Again, thank you for allowing me to participate in the care of your patient.        Sincerely,        Mark Meza MD

## 2020-10-26 NOTE — PROGRESS NOTES
Chief Complaint   Patient presents with     Ear Problem     seen on 9/29/20 for right ear pain- told he had perforated TM and infection was on oral antibiotics for 10 days- seen on 10/23 and told ear still red and inflamed and to go to ENT     History of Present Illness  Germain Iraheta is a 34 year old male who presents to today for ear evaluation.  I am seeing this patient in consultation for right otalgia at the request of the provider Isidra Owens CNP.  Patient reports developing ear pain and decreased hearing at the end of September.  He was placed on oral antibiotics.  He denies any problems with otorrhea.  He had ear tubes as a child but no other ear surgery history.  He feels like he hears better out of his left ear.  No significant dizziness or vertigo.  He was seen back for follow-up and I thought his ear canal still looked inflamed.  He was referred for ENT evaluation.  Audiology looked in his ear and felt like he is ear canal/eardrum looked abnormal.  He also had a cerumen impaction in his left ear.    Past Medical History  Patient Active Problem List   Diagnosis     CARDIOVASCULAR SCREENING; LDL GOAL LESS THAN 160     Impotence of organic origin     Current Medications     Current Outpatient Medications:      ciprofloxacin-dexamethasone (CIPRODEX) 0.3-0.1 % otic suspension, Place 5 drops in right ear twice daily for 7 days., Disp: 10 mL, Rfl: 3     gabapentin (NEURONTIN) 600 MG tablet, TAKE ONE TABLET BY MOUTH EVERY NIGHT AT BEDTIME (NEED TO BE SEEN IN CLINIC FOR FURTHER REFILLS), Disp: 30 tablet, Rfl: 0     traZODone (DESYREL) 100 MG tablet, TAKE TWO TABLETS BY MOUTH AT BEDTIME, Disp: 180 tablet, Rfl: 0     ibuprofen (ADVIL/MOTRIN) 200 MG tablet, Take 800-1,600 mg by mouth every 4 hours as needed for mild pain, Disp: , Rfl:      tadalafil (CIALIS) 20 MG tablet, TAKE ONE TABLET BY MOUTH ONCE DAILY AS NEEDED FOR ERECTILE DYSFUNCTION (Patient not taking: Reported on 10/23/2020), Disp: 6 tablet, Rfl:  3    Allergies  Allergies   Allergen Reactions     Amoxicillin Rash     Ceclor [Cefaclor Monohydrate] Rash     Erythromycin Rash     Lorabid [Loracarbef] Rash     Penicillins Rash       Social History   Social History     Socioeconomic History     Marital status: Single     Spouse name: Not on file     Number of children: Not on file     Years of education: Not on file     Highest education level: Not on file   Occupational History     Occupation: [employer]     Employer: other   Social Needs     Financial resource strain: Not on file     Food insecurity     Worry: Not on file     Inability: Not on file     Transportation needs     Medical: Not on file     Non-medical: Not on file   Tobacco Use     Smoking status: Current Every Day Smoker     Packs/day: 0.50     Years: 3.00     Pack years: 1.50     Types: Cigarettes     Smokeless tobacco: Never Used   Substance and Sexual Activity     Alcohol use: Yes     Alcohol/week: 0.0 standard drinks     Comment: 5 drinks a month     Drug use: Not Currently     Comment: past use     Sexual activity: Yes     Partners: Female   Lifestyle     Physical activity     Days per week: Not on file     Minutes per session: Not on file     Stress: Not on file   Relationships     Social connections     Talks on phone: Not on file     Gets together: Not on file     Attends Sabianism service: Not on file     Active member of club or organization: Not on file     Attends meetings of clubs or organizations: Not on file     Relationship status: Not on file     Intimate partner violence     Fear of current or ex partner: Not on file     Emotionally abused: Not on file     Physically abused: Not on file     Forced sexual activity: Not on file   Other Topics Concern     Parent/sibling w/ CABG, MI or angioplasty before 65F 55M? No   Social History Narrative     Not on file       Family History  Family History   Problem Relation Age of Onset     Diabetes Father      Cancer Maternal  Grandfather         lung     Hypertension No family hx of        Review of Systems  As per HPI and PMHx, otherwise 10+ comprehensive system review is negative.    Physical Exam  /77 (BP Location: Left arm, Patient Position: Chair, Cuff Size: Adult Large)   Pulse 81   Temp 98.2  F (36.8  C) (Tympanic)   Wt 94.3 kg (208 lb)   BMI 29.01 kg/m    GENERAL: Patient is a pleasant, cooperative 34 year old male in no acute distress.  HEAD: Normocephalic, atraumatic.  Hair and scalp are normal.  EYES: Pupils are equal, round, reactive to light and accommodation.  Extraocular movements are intact.  The sclera nonicteric without injection.  The extraocular structures are normal.  EARS: See below.   NEUROLOGIC: Cranial nerves II through XII are grossly intact.  Voice is strong.  Patient is House-Brackmann I/VI bilaterally.  CARDIOVASCULAR: Extremities are warm and well-perfused.  No significant peripheral edema.  RESPIRATORY: Patient has nonlabored breathing without cough, wheeze, stridor.  PSYCHIATRIC: Patient is alert and oriented.  Mood and affect appear normal.  SKIN: Warm and dry.  No scalp, face, or neck lesions noted.    Physical Exam and Procedure    EARS: Normal shape and symmetry.  No tenderness when palpating the mastoid or tragal areas bilaterally.  The ears were then examined under the otic binocular microscope to perform cerumen removal.  Otoscopic exam on the right reveals a clear canal.  There is a slight amount of erythema of the ear canal.  Patient has severe tympanic membrane retraction with an incudostapedial pexy and some erosion of the lateral process of the malleus.  There is a central tympanic membrane perforation.  There is no granulation or obvious drainage.  There is no luz cholesteatoma.  The tympanic membrane is retracted onto the promontory.      Attention was turned to the left ear.  Otoscopic exam on the left reveals impacted cerumen down to the level of the tympanic membrane.  The  cerumen was removed with an alligator forceps.  The left tympanic membrane was round, intact without evidence of effusion.  No retraction, granulation, drainage, or evidence of cholesteatoma.      Tuning fork examination reveals a Reynoso that lateralizes to the right ear.  Air conduction is greater than bone conduction on the left, bone conduction is greater than air conduction on the right.    Assessment and Plan     ICD-10-CM    1. Chronic Eustachian tube dysfunction, right  H69.81 ciprofloxacin-dexamethasone (CIPRODEX) 0.3-0.1 % otic suspension     OTOLARYNGOLOGY REFERRAL     Remove Cerumen   2. Conductive hearing loss of right ear with unrestricted hearing of left ear  H90.11 ciprofloxacin-dexamethasone (CIPRODEX) 0.3-0.1 % otic suspension     OTOLARYNGOLOGY REFERRAL     Remove Cerumen   3. Tympanic membrane retraction, right  H73.891 ciprofloxacin-dexamethasone (CIPRODEX) 0.3-0.1 % otic suspension     OTOLARYNGOLOGY REFERRAL     Remove Cerumen   4. History of placement of ear tubes  Z96.22 ciprofloxacin-dexamethasone (CIPRODEX) 0.3-0.1 % otic suspension     OTOLARYNGOLOGY REFERRAL     Remove Cerumen   5. Impacted cerumen of left ear  H61.22 ciprofloxacin-dexamethasone (CIPRODEX) 0.3-0.1 % otic suspension     OTOLARYNGOLOGY REFERRAL     Remove Cerumen     It was my pleasure seeing Germain Iraheta today in clinic.  The patient has a severe right tympanic membrane retraction with incudostapedial pexy consistent with chronic middle ear disease and a right-sided tympanic membrane perforation.  He is having some ear discomfort and a slight amount of canal erythema.  We will treat him with Ciprodex drops 5 drops twice a day for 7 days.  I will refer the patient to the [facility] for evaluation and management of his right chronic middle ear disease.  He likely will require tympanoplasty tympanomastoidectomy with likely cartilage graft tympanoplasty with or without ventilation tube placement give him a safe  and to help his hearing.  We discussed not placing anything in his ears except for eardrops.  We discussed dry ear precautions.    Mark Meza MD  Department of Otolarygology-Head and Neck Surgery  [facility]

## 2020-10-26 NOTE — NURSING NOTE
"Initial /77 (BP Location: Left arm, Patient Position: Chair, Cuff Size: Adult Large)   Pulse 81   Temp 98.2  F (36.8  C) (Tympanic)   Wt 94.3 kg (208 lb)   BMI 29.01 kg/m   Estimated body mass index is 29.01 kg/m  as calculated from the following:    Height as of 9/29/20: 1.803 m (5' 11\").    Weight as of this encounter: 94.3 kg (208 lb). .    Aspen Boyd LPN    "

## 2020-10-27 ENCOUNTER — TELEPHONE (OUTPATIENT)
Dept: OTOLARYNGOLOGY | Facility: CLINIC | Age: 35
End: 2020-10-27

## 2020-10-27 ENCOUNTER — TELEPHONE (OUTPATIENT)
Dept: FAMILY MEDICINE | Facility: CLINIC | Age: 35
End: 2020-10-27

## 2020-10-27 NOTE — TELEPHONE ENCOUNTER
Patient was seen by ENT and was told he will need surgery down at the [facility] for his right ear.  He saw AJAY Turner for this about a month ago, wondering if we can fill out workability forms (was told PCP to fill out).  His [employer] company will fax these to clinic.    Gabriela Nieves RN

## 2020-10-27 NOTE — TELEPHONE ENCOUNTER
Reason for Call:  Other call back    Detailed comments: Patient is calling asking for a letter for work he is having a really hard time with his ear and he has tried plugge's and ear muffs for the sound and it is not helping he is asking for a letter till his appointment    Phone Number Patient can be reached at: Home number on file [phone] (home)    Best Time: any    Can we leave a detailed message on this number? YES    Call taken on 10/27/2020 at 11:24 AM by Mariana Rivera

## 2020-10-28 ENCOUNTER — NURSE TRIAGE (OUTPATIENT)
Dept: NURSING | Facility: CLINIC | Age: 35
End: 2020-10-28

## 2020-10-28 ENCOUNTER — TELEPHONE (OUTPATIENT)
Dept: OTOLARYNGOLOGY | Facility: CLINIC | Age: 35
End: 2020-10-28

## 2020-10-28 NOTE — TELEPHONE ENCOUNTER
Called pt and left Vm. Some discomfort is abnormal, he should try to ensure that drops are warmed up prior to administering. Either roll bottle between hands for a few minutes prior to admin or put in pants pocket to keep warm. Left call back number.    Maya Donato RN Specialty Triage 10/28/2020 10:27 AM

## 2020-10-28 NOTE — TELEPHONE ENCOUNTER
Pt returned call, he is still anxious about the discomfort. He will warm up medication and use ibuprofen/tyelnol prior to use of drops. He will call back if he needs anything else.    Maya Donato RN Specialty Triage 10/28/2020 12:51 PM

## 2020-10-28 NOTE — TELEPHONE ENCOUNTER
Germain is calling to speak with Surgery direct MD Mark Meza [facility] to verify fax number that he gave to them for faxing.  Germain feels it may have been the wrong fax number.      COVID 19 Nurse Triage Plan/Patient Instructions    Please be aware that novel coronavirus (COVID-19) may be circulating in the community. If you develop symptoms such as fever, cough, or SOB or if you have concerns about the presence of another infection including coronavirus (COVID-19), please contact your health care provider or visit [email or web address].     Disposition/Instructions    Home care recommended. Follow home care protocol based instructions.    Thank you for taking steps to prevent the spread of this virus.  o Limit your contact with others.  o Wear a simple mask to cover your cough.  o Wash your hands well and often.    Resources    [facility]: About COVID-19: [email or web address]    CDC: What to Do If You're Sick: www.cdc.gov/coronavirus/2019-ncov/about/steps-when-sick.html    CDC: Ending Home Isolation: www.cdc.gov/coronavirus/2019-ncov/hcp/disposition-in-home-patients.html     CDC: Caring for Someone: www.cdc.gov/coronavirus/2019-ncov/if-you-are-sick/care-for-someone.html     [facility]: Interim Guidance for Hospital Discharge to Home: www.health.[facility].mn.us/diseases/coronavirus/hcp/hospdischarge.pdf    [facility] clinical trials (COVID-19 research studies): [email or web address].[facility]-clinical-trials     Below are the COVID-19 hotlines at the [facility] ([facility]). Interpreters are available.   o For health questions: Call 861-186-9307 or 1-847.950.1265 (7 a.m. to 7 p.m.)  o For questions about schools and childcare: Call 824-218-4529 or 1-473.881.7175 (7 a.m. to 7 p.m.)                       Reason for Disposition    [1] Caller requesting NON-URGENT health information AND [2] PCP's office is the best resource    Additional Information    Negative: Requesting regular office  appointment    Negative: RN needs further essential information from caller in order to complete triage    Protocols used: INFORMATION ONLY CALL-A-AH

## 2020-10-28 NOTE — TELEPHONE ENCOUNTER
Reason for Call:  Other call back and prescription    Detailed comments: Patient states that when he took ciprofloxacin-dexamethasone (CIPRODEX) he experienced extreme pain and wants to know is that normal. Patient  also wants an alternative    Phone Number Patient can be reached at: Home number on file [phone] (home)    Best Time: any    Can we leave a detailed message on this number? YES    Call taken on 10/28/2020 at 8:39 AM by Pedro Gasca

## 2020-10-28 NOTE — TELEPHONE ENCOUNTER
"FUTURE VISIT INFORMATION      FUTURE VISIT INFORMATION:    Date: 11/18/2020    Time: 7:30AM    [place] [facility] [place]  REFERRAL INFORMATION:    Referring provider:  Mark Meza MD    Referring providers clinic:  [facility]     Reason for visit/diagnosis  Chronic Eustachian tube dysfunction, right, Conductive hearing loss of right ear with unrestricted hearing of left ear,Tympanic membrane retraction, right, History of placement of ear tubes,Impacted cerumen of left ear, Schedule with [facility] 1st Available Per robert Almanza in [facility] Per Pt, Pt had Audiogram 10/26 with [facility] in [place]    RECORDS REQUESTED FROM:       Clinic name Comments Records Status Imaging Status   [facility]  10/26/2020 referral and note from Mark Meza MD  *audiogram was deferred : \"After medical evaluation by Dr. Meza the hearing evaluation was deferrd. \" [facility]  10/23/2020 note from Isidra Owens, APRN CNP  9/29/2020 note from Noreen Turner, NP Epic                                "

## 2020-10-29 ENCOUNTER — TELEPHONE (OUTPATIENT)
Dept: FAMILY MEDICINE | Facility: CLINIC | Age: 35
End: 2020-10-29

## 2020-10-29 NOTE — TELEPHONE ENCOUNTER
Spoke with pt.      He is having right ear problems and saw Dr Meza, ENT, 3 days ago.  He is asking for both a letter and FMLA for his ear problems.  Pt updates that he has consultation with a surgeon at the  on 11/18/20.    Advised follow up with PCP so this can be properly addressed.  Appt can be virtual.    Pt will arrange.    Saundra Cloud RN

## 2020-10-29 NOTE — TELEPHONE ENCOUNTER
Reason for call:  Other   Patient called regarding (reason for call): call back  Additional comments: Patient is trying to figure out FMLA forms process. Work involves loud noises, ear drum has collapsed and is causing pain per pt.     Phone number to reach patient:  Home number on file [phone] (home)    Best Time:  Anytime    Can we leave a detailed message on this number?  YES    Travel screening: Not Applicable

## 2020-10-30 ENCOUNTER — VIRTUAL VISIT (OUTPATIENT)
Dept: FAMILY MEDICINE | Facility: CLINIC | Age: 35
End: 2020-10-30
Payer: COMMERCIAL

## 2020-10-30 DIAGNOSIS — N52.9 IMPOTENCE OF ORGANIC ORIGIN: ICD-10-CM

## 2020-10-30 DIAGNOSIS — H92.09 OTALGIA, UNSPECIFIED LATERALITY: Primary | ICD-10-CM

## 2020-10-30 PROCEDURE — 99213 OFFICE O/P EST LOW 20 MIN: CPT | Mod: 95 | Performed by: FAMILY MEDICINE

## 2020-10-30 NOTE — PROGRESS NOTES
"Germain Iraheta is a 34 year old male who is being evaluated via a billable telephone visit.      The patient has been notified of following:     \"This telephone visit will be conducted via a call between you and your physician/provider. We have found that certain health care needs can be provided without the need for a physical exam.  This service lets us provide the care you need with a short phone conversation.  If a prescription is necessary we can send it directly to your pharmacy.  If lab work is needed we can place an order for that and you can then stop by our lab to have the test done at a later time.    Telephone visits are billed at different rates depending on your insurance coverage. During this emergency period, for some insurers they may be billed the same as an in-person visit.  Please reach out to your insurance provider with any questions.    If during the course of the call the physician/provider feels a telephone visit is not appropriate, you will not be charged for this service.\"    Patient has given verbal consent for Telephone visit?  Yes    What phone number would you like to be contacted at? [phone]      How would you like to obtain your AVS? Mail a copy    Subjective     Germain Iraheta is a 34 year old male who presents via phone visit today for the following health issues:    HPI      The ENT appt on 10-26-20 with Dr. Meza showed:   Assessment and Plan       ICD-10-CM     1. Chronic Eustachian tube dysfunction, right  H69.81 ciprofloxacin-dexamethasone (CIPRODEX) 0.3-0.1 % otic suspension       OTOLARYNGOLOGY REFERRAL       Remove Cerumen   2. Conductive hearing loss of right ear with unrestricted hearing of left ear  H90.11 ciprofloxacin-dexamethasone (CIPRODEX) 0.3-0.1 % otic suspension       OTOLARYNGOLOGY REFERRAL       Remove Cerumen   3. Tympanic membrane retraction, right  H73.891 ciprofloxacin-dexamethasone (CIPRODEX) 0.3-0.1 % otic suspension       OTOLARYNGOLOGY " REFERRAL       Remove Cerumen   4. History of placement of ear tubes  Z96.22 ciprofloxacin-dexamethasone (CIPRODEX) 0.3-0.1 % otic suspension       OTOLARYNGOLOGY REFERRAL       Remove Cerumen   5. Impacted cerumen of left ear  H61.22 ciprofloxacin-dexamethasone (CIPRODEX) 0.3-0.1 % otic suspension       OTOLARYNGOLOGY REFERRAL       Remove Cerumen      It was my pleasure seeing Germain Iraheta today in clinic.  The patient has a severe right tympanic membrane retraction with incudostapedial pexy consistent with chronic middle ear disease and a right-sided tympanic membrane perforation.  He is having some ear discomfort and a slight amount of canal erythema.  We will treat him with Ciprodex drops 5 drops twice a day for 7 days.  I will refer the patient to the [facility] for evaluation and management of his right chronic middle ear disease.  He likely will require tympanoplasty tympanomastoidectomy with likely cartilage graft tympanoplasty with or without ventilation tube placement give him a safe and to help his hearing.  We discussed not placing anything in his ears except for eardrops.  We discussed dry ear precautions.    Ear Pain    Symptoms 1 month.     States symptoms started after pain from placing foam ear plugs into right ear at work. Also works around loud noises.      States that he was evaluated by Jhon Johnson, shantel in ear drum, ordered antibiotics for 10 days.    States no improvement from antibiotics.     Evaluated by ENT, diagnosed with    Chronic Eustachian tube dysfunction, right      Conductive hearing loss of right ear with unrestricted hearing of left ear      Tympanic membrane retraction, right    States that dexamethasone drops prescribed by ENT are painful (contacted ENT about this 10/28/20).    Was referred to [facility] for consultation on 11/18/20.    Does not feel that he is able to work due to loud noises.    States that he needs a form stating that he is not  able to return to work, or restrictions.     States that employers HR only needs a note, no need for full FMLA paperwork.    He is around loud noises at work and has Tinnitus and discomfort.     Does not have FMLA forms, has not sent any to clinic.      Current Outpatient Medications   Medication Instructions     ciprofloxacin-dexamethasone (CIPRODEX) 0.3-0.1 % otic suspension Place 5 drops in right ear twice daily for 7 days.     gabapentin (NEURONTIN) 600 MG tablet TAKE ONE TABLET BY MOUTH EVERY NIGHT AT BEDTIME (NEED TO BE SEEN IN CLINIC FOR FURTHER REFILLS)     ibuprofen (ADVIL/MOTRIN) 800-1,600 mg, Oral, EVERY 4 HOURS PRN     tadalafil (CIALIS) 20 MG tablet TAKE ONE TABLET BY MOUTH ONCE DAILY AS NEEDED FOR ERECTILE DYSFUNCTION     traZODone (DESYREL) 100 MG tablet TAKE TWO TABLETS BY MOUTH AT BEDTIME       Patient Active Problem List   Diagnosis     CARDIOVASCULAR SCREENING; LDL GOAL LESS THAN 160     Impotence of organic origin       (H92.09) Otalgia, unspecified laterality  (primary encounter diagnosis)  Comment:   Plan: we reviewed the ENT appt from 10-26 and his symptoms since. I will write the letter to avoid loud noises at work   From today until 11-20-20. He is seeing ENT from the [employer] on 11-18-20 and should get further restrictions from them.   Call Dr. Meza if there are questions about his ear medications.     Phone call duration:  13 minutes

## 2020-10-30 NOTE — LETTER
[facility]  [address] [address]  Phone: [phone]    October 30, 2020      Germain DIAZ Myrtle  [address] [facility] [address]      Dear Mr. Iraheta    In regard to work, you should not be exposed to noise levels over 70 decibels.   This goes from today until 11-20-20. The ENT physician at the [facility]  Will decide on further restrictions.     Sincerely,    / Ervin Arteaga MD

## 2020-10-30 NOTE — TELEPHONE ENCOUNTER
"Requested Prescriptions   Pending Prescriptions Disp Refills     tadalafil (CIALIS) 20 MG tablet [Pharmacy Med Name: TADALAFIL 20MG TABS] 6 tablet 3     Sig: TAKE ONE TABLET BY MOUTH ONCE DAILY AS NEEDED FOR ERECTILE DYSFUNCTION       Erectile Dysfuction Protocol Passed - 10/30/2020 12:02 PM        Passed - Absence of nitrates on medication list        Passed - Absence of Alpha Blockers on Med list        Passed - Recent (12 mo) or future (30 days) visit within the authorizing provider's specialty     Patient has had an office visit with the authorizing provider or a provider within the authorizing providers department within the previous 12 mos or has a future within next 30 days. See \"Patient Info\" tab in inbasket, or \"Choose Columns\" in Meds & Orders section of the refill encounter.              Passed - Medication is active on med list        Passed - Patient is age 18 or older             "

## 2020-11-02 RX ORDER — TADALAFIL 20 MG/1
TABLET ORAL
Qty: 6 TABLET | Refills: 3 | Status: SHIPPED | OUTPATIENT
Start: 2020-11-02 | End: 2021-02-22

## 2020-11-02 NOTE — TELEPHONE ENCOUNTER
Routing refill request to provider for review/approval because:   Please advise - do you want pt to schedule a visit? 5/29/2020 VV note: 'Return in about 3 months (around 8/29/2020) for or sooner if symptoms persist or worsen, Routine Visit, Med Check.'      Pt did have some labs done when in ED 8/19/2020.    Malathi ADAMS RN, BSN

## 2020-11-03 ENCOUNTER — TELEPHONE (OUTPATIENT)
Dept: OTOLARYNGOLOGY | Facility: CLINIC | Age: 35
End: 2020-11-03

## 2020-11-05 ENCOUNTER — TELEPHONE (OUTPATIENT)
Dept: OTOLARYNGOLOGY | Facility: CLINIC | Age: 35
End: 2020-11-05

## 2020-11-05 NOTE — TELEPHONE ENCOUNTER
I spoke to Jim today. He had seen Dr Meza on 10-26-20 regarding Right Chronic eustachian tube dysfunction ,right middle ear disease and right perforation.  Germain uses ear plugs for noise at work and thought that this problem could be considered work comp. I told Germain that ear plugs would not cause a perforation and this appears to be a chronic problem. I explained that it is typically fluid behind the TM that causes the perforation. It sounds like he did have ear tubes as a child.  He said that he would let his work know. Ewelina VARMA Rn

## 2020-11-05 NOTE — TELEPHONE ENCOUNTER
Reason for call:  Patient reporting a symptom    Symptom or request: Pt calling - injured himself at work.  Needs surgery - but never discussed how the injury happened.  Would like this documented.    Duration (how long have symptoms been present):     Have you been treated for this before? Yes    Additional comments:     Phone Number patient can be reached at:  Home number on file [phone] (home)    Best Time:      Can we leave a detailed message on this number:  YES    Call taken on 11/5/2020 at 12:21 PM by Freda Gustafson

## 2020-11-18 ENCOUNTER — OFFICE VISIT (OUTPATIENT)
Dept: OTOLARYNGOLOGY | Facility: CLINIC | Age: 35
End: 2020-11-18
Attending: OTOLARYNGOLOGY
Payer: COMMERCIAL

## 2020-11-18 ENCOUNTER — OFFICE VISIT (OUTPATIENT)
Dept: AUDIOLOGY | Facility: CLINIC | Age: 35
End: 2020-11-18
Payer: COMMERCIAL

## 2020-11-18 ENCOUNTER — PRE VISIT (OUTPATIENT)
Dept: OTOLARYNGOLOGY | Facility: CLINIC | Age: 35
End: 2020-11-18

## 2020-11-18 VITALS
RESPIRATION RATE: 17 BRPM | OXYGEN SATURATION: 97 % | HEIGHT: 71 IN | HEART RATE: 89 BPM | SYSTOLIC BLOOD PRESSURE: 122 MMHG | DIASTOLIC BLOOD PRESSURE: 80 MMHG | WEIGHT: 207 LBS | BODY MASS INDEX: 28.98 KG/M2 | TEMPERATURE: 98.3 F

## 2020-11-18 DIAGNOSIS — H90.11 CONDUCTIVE HEARING LOSS IN RIGHT EAR: Primary | ICD-10-CM

## 2020-11-18 DIAGNOSIS — H72.91 PERFORATION OF RIGHT TYMPANIC MEMBRANE: Primary | ICD-10-CM

## 2020-11-18 DIAGNOSIS — H90.11 CONDUCTIVE HEARING LOSS OF RIGHT EAR WITH UNRESTRICTED HEARING OF LEFT EAR: ICD-10-CM

## 2020-11-18 DIAGNOSIS — H69.91 DYSFUNCTION OF RIGHT EUSTACHIAN TUBE: ICD-10-CM

## 2020-11-18 PROCEDURE — 92567 TYMPANOMETRY: CPT | Performed by: AUDIOLOGIST

## 2020-11-18 PROCEDURE — 92557 COMPREHENSIVE HEARING TEST: CPT | Performed by: AUDIOLOGIST

## 2020-11-18 PROCEDURE — 92565 STENGER TEST PURE TONE: CPT | Performed by: AUDIOLOGIST

## 2020-11-18 PROCEDURE — 99244 OFF/OP CNSLTJ NEW/EST MOD 40: CPT | Mod: 25 | Performed by: OTOLARYNGOLOGY

## 2020-11-18 PROCEDURE — 92504 EAR MICROSCOPY EXAMINATION: CPT | Performed by: OTOLARYNGOLOGY

## 2020-11-18 RX ORDER — CLINDAMYCIN PHOSPHATE 900 MG/50ML
900 INJECTION, SOLUTION INTRAVENOUS
Status: CANCELLED | OUTPATIENT
Start: 2020-11-18

## 2020-11-18 RX ORDER — CLINDAMYCIN PHOSPHATE 900 MG/50ML
900 INJECTION, SOLUTION INTRAVENOUS SEE ADMIN INSTRUCTIONS
Status: CANCELLED | OUTPATIENT
Start: 2020-11-18

## 2020-11-18 RX ORDER — DEXAMETHASONE SODIUM PHOSPHATE 4 MG/ML
10 INJECTION, SOLUTION INTRA-ARTICULAR; INTRALESIONAL; INTRAMUSCULAR; INTRAVENOUS; SOFT TISSUE ONCE
Status: CANCELLED | OUTPATIENT
Start: 2020-11-18 | End: 2020-11-18

## 2020-11-18 ASSESSMENT — MIFFLIN-ST. JEOR: SCORE: 1891.08

## 2020-11-18 ASSESSMENT — PAIN SCALES - GENERAL: PAINLEVEL: NO PAIN (0)

## 2020-11-18 NOTE — LETTER
11/18/2020       RE: Germain Iraheta  [address] [facility] [address]     Dear Colleague,    Thank you for referring your patient, Germain Iraheta, to the [facility]RO[facility] [place] [facility]. Please see a copy of my visit note below.    Germain Iraheta is seen in consultation from Dr. Meaz.  He is a 34 year old male being seen for a right TM perforation and eustachian tube dysfunction. He reports he had an ear infection last September which was treated with antibiotics. It seemed to improve but Dr. Meza noted significant retraction as well as a perforation. He reports that he did have ear problems as a child with PE tube placement and has had some infections as an adult. He says his right hearing has been getting worse over time and he thinks it has always been his worse hearing ear. He denies current otalgia or otorrhea. No vertigo.    Past Medical History:   Diagnosis Date     ED (erectile dysfunction)      Insomnia      RLS (restless legs syndrome)      Tobacco use disorder        Past Surgical History:   Procedure Laterality Date     PE TUBES         Family History   Problem Relation Age of Onset     Diabetes Father      Cancer Maternal Grandfather         lung     Hypertension No family hx of        Social History     Tobacco Use     Smoking status: Current Every Day Smoker     Packs/day: 0.50     Years: 3.00     Pack years: 1.50     Types: Cigarettes     Smokeless tobacco: Never Used   Substance Use Topics     Alcohol use: Yes     Alcohol/week: 0.0 standard drinks     Comment: 5 drinks a month     Drug use: Not Currently     Comment: past use       Patient Supplied Answers to Review of Systems  [facility] E[facility] ROS 11/18/2020   Constitutional Problems with sleep   Neurology Headache   Ears, Nose, Throat Ear pain, Ringing/noise in ears, Hoarseness   The remainder of the 10 point review of systems is otherwise negative.    Physical  examination:  Constitutional:  In no acute distress, appears stated age  Eyes:  Extraocular movements intact, no spontaneous nystagmus  Ears:  Both ears examined under the microscope.  Left ear canal clear, TM intact with a well aerated middle ear. Right ear canal clear, 80% perforation with TM only remaining superiorly, stapes clearly visible with erosion of the long process of the incus although there may still be a remnant left with TM adhered to the end, no cholesteatoma noted, no infection noted.  Respiratory:  No increased work of breathing, wheezing or stridor  Musculoskeletal:  Good upper extremity strength  Skin:  No rashes on the head and neck  Neurologic:  House Brackman 1/6 bilaterally, ambulating normally  Psychiatric:  Alert, normal affect, answering questions appropriately    Audiogram:  Normal left hearing with 100% speech discrimination and normal tympanogram. Right primarily moderate conductive hearing loss with an upnotch at 2Khz and the high frequencies into the mild range, 100% speech discrimination, high volume flat tympanogram.    Assessment and plan:  Right large perforation and chronic eustachian tube dysfunction. We discussed the exam findings and recommendation for a cartilage tympanoplasty with possible ossicular chain reconstruction. He will also need a t-tube once he has healed due to the chronic eustachian tube dysfunction. If cholesteatoma is identified at the time of surgery, then he would need a revision surgery in 6 months and no ossicular reconstruction would be performed at the first surgery. The risks and benefits were discussed.  The risks include but are not limited to:  Worsened hearing which may require further surgery, profound and irreversible hearing loss, dizziness, damage to the taste nerve, damage to the facial nerve, tympanic membrane perforation requiring further surgery and infection.  Postoperative restrictions were discussed. He had his questions answered and  agreed to proceed with the procedure.      Again, thank you for allowing me to participate in the care of your patient.      Sincerely,    Sandra Peña MD

## 2020-11-18 NOTE — PROGRESS NOTES
AUDIOLOGY REPORT    SUMMARY: Audiology visit completed. See audiogram for results.      RECOMMENDATIONS: Follow-up with ENT.    Concepcion Tellez, [facility]  Licensed Audiologist  [place] License #[id number]

## 2020-11-18 NOTE — PROGRESS NOTES
Germain DIAZ Myrtle is seen in consultation from Dr. Meza.  He is a 34 year old male being seen for a right TM perforation and eustachian tube dysfunction. He reports he had an ear infection last September which was treated with antibiotics. It seemed to improve but Dr. Meza noted significant retraction as well as a perforation. He reports that he did have ear problems as a child with PE tube placement and has had some infections as an adult. He says his right hearing has been getting worse over time and he thinks it has always been his worse hearing ear. He denies current otalgia or otorrhea. No vertigo.    Past Medical History:   Diagnosis Date     ED (erectile dysfunction)      Insomnia      RLS (restless legs syndrome)      Tobacco use disorder        Past Surgical History:   Procedure Laterality Date     PE TUBES         Family History   Problem Relation Age of Onset     Diabetes Father      Cancer Maternal Grandfather         lung     Hypertension No family hx of        Social History     Tobacco Use     Smoking status: Current Every Day Smoker     Packs/day: 0.50     Years: 3.00     Pack years: 1.50     Types: Cigarettes     Smokeless tobacco: Never Used   Substance Use Topics     Alcohol use: Yes     Alcohol/week: 0.0 standard drinks     Comment: 5 drinks a month     Drug use: Not Currently     Comment: past use       Patient Supplied Answers to Review of Systems   ENT ROS 11/18/2020   Constitutional Problems with sleep   Neurology Headache   Ears, Nose, Throat Ear pain, Ringing/noise in ears, Hoarseness   The remainder of the 10 point review of systems is otherwise negative.    Physical examination:  Constitutional:  In no acute distress, appears stated age  Eyes:  Extraocular movements intact, no spontaneous nystagmus  Ears:  Both ears examined under the microscope.  Left ear canal clear, TM intact with a well aerated middle ear. Right ear canal clear, 80% perforation with TM only remaining superiorly,  stapes clearly visible with erosion of the long process of the incus although there may still be a remnant left with TM adhered to the end, no cholesteatoma noted, no infection noted.  Respiratory:  No increased work of breathing, wheezing or stridor  Musculoskeletal:  Good upper extremity strength  Skin:  No rashes on the head and neck  Neurologic:  House Brackman 1/6 bilaterally, ambulating normally  Psychiatric:  Alert, normal affect, answering questions appropriately    Audiogram:  Normal left hearing with 100% speech discrimination and normal tympanogram. Right primarily moderate conductive hearing loss with an upnotch at 2Khz and the high frequencies into the mild range, 100% speech discrimination, high volume flat tympanogram.    Assessment and plan:  Right large perforation and chronic eustachian tube dysfunction. We discussed the exam findings and recommendation for a cartilage tympanoplasty with possible ossicular chain reconstruction. He will also need a t-tube once he has healed due to the chronic eustachian tube dysfunction. If cholesteatoma is identified at the time of surgery, then he would need a revision surgery in 6 months and no ossicular reconstruction would be performed at the first surgery. The risks and benefits were discussed.  The risks include but are not limited to:  Worsened hearing which may require further surgery, profound and irreversible hearing loss, dizziness, damage to the taste nerve, damage to the facial nerve, tympanic membrane perforation requiring further surgery and infection.  Postoperative restrictions were discussed. He had his questions answered and agreed to proceed with the procedure.

## 2020-11-18 NOTE — LETTER
[facility] EAR NOSE AND THROAT CLINIC [address]  [address] [facility] [address]  Phone: [phone]  Fax: [phone]    11/18/20    Germain Iraheta  [address] [facility] [address]      To whom it may concern:     This patient has been lifted from any noise restrictions effective 11/18/20,    Sincerely,      Sandra Peña MD

## 2020-11-18 NOTE — LETTER
[facility] EAR NOSE AND THROAT CLINIC [address]  [address] [facility] [address]  Phone: [phone]  Fax: [phone]    11/18/20    Germain Iraheta  [address] [facility] [address]      To whom it may concern:     This patient has been lifted from any noise restrictions effective 11/20/20.    Sincerely,      Sandra Peña MD

## 2020-11-18 NOTE — PATIENT INSTRUCTIONS
1. You were seen in the ENT Clinic today by Dr. Peña.  If you have any questions or concerns after your appointment, please call   - Option 1: ENT Clinic: [phone]   - Option 2: Betty (Dr. Peña's Nurse): [phone]    3. TMJ referral      Betty Funes LPN  [facility] - Otolaryngology   Surgery Teaching      1.You must have a physical exam (called  history and physical ) within 30 days of surgery. You can do this at the PAC clinic or your family clinic.     2.For same-day surgery, you must arrange for an adult to take you home from the Center. An adult must stay with you for the first 24 hours after surgery. You cannot drive for 24 hours.     3. Ask your doctor what medicines are safe before surgery.     4. Stop drinking alcohol at least 24 hours before surgery.     5. Stop or at least cut down on smoking 24 hours before surgery.    6.Take a bath or shower the night before and the morning of surgery (as told by your surgeon). Use an antiseptic soap. If your doctor does not give you special soap, buy Hibiclens or Danielle-Stat at the drug store or ask the pharmacist to suggest a brand.  Do not put on lotion, powder, perfume, deodorant or make-up after bathing.    7. You can eat a normal meal the night before surgery. Do not eat any solid foods or drink any milk products for 8 hours before surgery.     8. You may drink clear liquids until 2 hours before surgery. Clear liquids include water, Gatorade, apple juice and liquids you can read through.    9. NO MOTRIN, IBUPROFEN, ASPIRIN, ALEVE, GARLIC SUPPLEMENTS or FISH OIL x 7 days prior to surgery ( to prevent excess bleeding and bruising at time of surgery)

## 2020-11-18 NOTE — NURSING NOTE
"Chief Complaint   Patient presents with     Consult     eustachian tube dysfunction     Blood pressure 122/80, pulse 89, temperature 98.3  F (36.8  C), resp. rate 17, height 1.803 m (5' 11\"), weight 93.9 kg (207 lb), SpO2 97 %.    Yared Rodriguez LPN    "

## 2020-11-20 ENCOUNTER — TELEPHONE (OUTPATIENT)
Dept: OTOLARYNGOLOGY | Facility: CLINIC | Age: 35
End: 2020-11-20

## 2020-11-20 PROBLEM — H90.11 CONDUCTIVE HEARING LOSS OF RIGHT EAR WITH UNRESTRICTED HEARING OF LEFT EAR: Status: ACTIVE | Noted: 2020-11-20

## 2020-11-20 PROBLEM — H72.91 PERFORATION OF RIGHT TYMPANIC MEMBRANE: Status: ACTIVE | Noted: 2020-11-20

## 2020-11-20 NOTE — TELEPHONE ENCOUNTER
Left message regarding scheduling surgery with Dr. Peña. Call back number left on voicemail [phone].       Leila Soria   Perioperative Coordinator  Department of Otolaryngology    Office: [phone]

## 2020-11-27 NOTE — TELEPHONE ENCOUNTER
Called patient to schedule surgery with     Date of Surgery: 1/18/2021- patient was offered sooner, but declined due to holidays and vacation     Location: [facility]    PAC or PCP:  PCP- patient informed that he will need to call his clinic. Patient mentioned [facility]    Post op: 3 weeks     Imaging: No    Surgery packet given: will mail again, 11/30    Surgery teaching completed: DESTINI Riddle    Sent to Prior Authorization Team: 11/20.    Additional comments:   Discussed covid testing with patient.       Leila Soria   Perioperative Coordinator   Department of Otolaryngology  P: [phone]

## 2020-11-28 DIAGNOSIS — G47.62 NOCTURNAL LEG CRAMPS: ICD-10-CM

## 2020-11-28 DIAGNOSIS — R25.2 BILATERAL LEG CRAMPS: ICD-10-CM

## 2020-11-29 DIAGNOSIS — Z11.59 ENCOUNTER FOR SCREENING FOR OTHER VIRAL DISEASES: Primary | ICD-10-CM

## 2020-11-30 NOTE — TELEPHONE ENCOUNTER
Requested Prescriptions   Pending Prescriptions Disp Refills     gabapentin (NEURONTIN) 600 MG tablet [Pharmacy Med Name: GABAPENTIN 600MG TABS] 30 tablet 0     Sig: TAKE ONE TABLET BY MOUTH EVERY NIGHT AT BEDTIME - NEED TO BE SEEN IN CLINIC FOR FURTHER REFILLS       There is no refill protocol information for this order

## 2020-12-01 RX ORDER — GABAPENTIN 600 MG/1
TABLET ORAL
Qty: 30 TABLET | Refills: 0 | Status: SHIPPED | OUTPATIENT
Start: 2020-12-01 | End: 2021-02-22

## 2020-12-23 DIAGNOSIS — Z20.822 ENCOUNTER FOR LABORATORY TESTING FOR COVID-19 VIRUS: Primary | ICD-10-CM

## 2020-12-23 DIAGNOSIS — Z20.822 ENCOUNTER FOR LABORATORY TESTING FOR COVID-19 VIRUS: ICD-10-CM

## 2020-12-23 PROCEDURE — U0003 INFECTIOUS AGENT DETECTION BY NUCLEIC ACID (DNA OR RNA); SEVERE ACUTE RESPIRATORY SYNDROME CORONAVIRUS 2 (SARS-COV-2) (CORONAVIRUS DISEASE [COVID-19]), AMPLIFIED PROBE TECHNIQUE, MAKING USE OF HIGH THROUGHPUT TECHNOLOGIES AS DESCRIBED BY CMS-2020-01-R: HCPCS | Performed by: OTOLARYNGOLOGY

## 2020-12-24 LAB
SARS-COV-2 RNA SPEC QL NAA+PROBE: NOT DETECTED
SPECIMEN SOURCE: NORMAL

## 2021-01-04 ENCOUNTER — OFFICE VISIT (OUTPATIENT)
Dept: FAMILY MEDICINE | Facility: CLINIC | Age: 36
End: 2021-01-04
Payer: COMMERCIAL

## 2021-01-04 VITALS
BODY MASS INDEX: 28 KG/M2 | SYSTOLIC BLOOD PRESSURE: 136 MMHG | TEMPERATURE: 97.2 F | HEART RATE: 82 BPM | OXYGEN SATURATION: 99 % | HEIGHT: 71 IN | WEIGHT: 200 LBS | DIASTOLIC BLOOD PRESSURE: 86 MMHG

## 2021-01-04 DIAGNOSIS — H72.91 PERFORATION OF RIGHT TYMPANIC MEMBRANE: ICD-10-CM

## 2021-01-04 DIAGNOSIS — Z01.818 PRE-OP EVALUATION: Primary | ICD-10-CM

## 2021-01-04 DIAGNOSIS — H90.11 CONDUCTIVE HEARING LOSS OF RIGHT EAR WITH UNRESTRICTED HEARING OF LEFT EAR: ICD-10-CM

## 2021-01-04 PROCEDURE — 99214 OFFICE O/P EST MOD 30 MIN: CPT | Performed by: NURSE PRACTITIONER

## 2021-01-04 ASSESSMENT — MIFFLIN-ST. JEOR: SCORE: 1846.38

## 2021-01-04 NOTE — PROGRESS NOTES
[facility]  [address] [address]  Phone: [phone]  Primary Provider: Lashay Yin  Pre-op Performing Provider: LENNOX LOERA    PREOPERATIVE EVALUATION:  Today's date: 1/4/2021    Germain Iraheta is a 35 year old male who presents for a preoperative evaluation.    Surgical Information:  Surgery/Procedure: RIGHT TYMPANOPLASTY WITH CARTILAGE BACKING, POSSIBLE OSSICULAR CHAIN RECONSTRUCTION-    Surgery Location: [facility] OR  Surgeon: Dr. Peña  Surgery Date: 1/18/2021  Time of Surgery: 0830  Where patient plans to recover: At home with family  Fax number for surgical facility: Note does not need to be faxed, will be available electronically in Epic.    Type of Anesthesia Anticipated: General    Subjective     HPI related to upcoming procedure:   Started with ear infection in September.  Non-healing perforation with hearing loss.      Preop Questions 1/4/2021   1. Have you ever had a heart attack or stroke? No   2. Have you ever had surgery on your heart or blood vessels, such as a stent placement, a coronary artery bypass, or surgery on an artery in your head, neck, heart, or legs? No   3. Do you have chest pain with activity? No   4. Do you have a history of  heart failure? No   5. Do you currently have a cold, bronchitis or symptoms of other infection? No   6. Do you have a cough, shortness of breath, or wheezing? No   7. Do you or anyone in your family have previous history of blood clots? No   8. Do you or does anyone in your family have a serious bleeding problem such as prolonged bleeding following surgeries or cuts? No   9. Have you ever had problems with anemia or been told to take iron pills? No   10. Have you had any abnormal blood loss such as black, tarry or bloody stools? No   11. Have you ever had a blood transfusion? No   12. Are you willing to have a blood transfusion if it is medically needed before, during, or after your surgery? Yes    13. Have you or any of your relatives ever had problems with anesthesia? No   14. Do you have sleep apnea, excessive snoring or daytime drowsiness? YES - snores   14a. Do you have a CPAP machine? No   15. Do you have any artifical heart valves or other implanted medical devices like a pacemaker, defibrillator, or continuous glucose monitor? No   16. Do you have artificial joints? No   17. Are you allergic to latex? No     Health Care Directive:  Patient does not have a Health Care Directive or Living Will: Discussed advance care planning with patient; however, patient declined at this time.          Review of Systems  CONSTITUTIONAL: NEGATIVE for fever, chills, change in weight  INTEGUMENTARY/SKIN: NEGATIVE for worrisome rashes, moles or lesions  EYES: NEGATIVE for vision changes or irritation  ENT/MOUTH: NEGATIVE for ear, mouth and throat problems  RESP: NEGATIVE for significant cough or SOB  BREAST: NEGATIVE for masses, tenderness or discharge  CV: NEGATIVE for chest pain, palpitations or peripheral edema  GI: NEGATIVE for nausea, abdominal pain, heartburn, or change in bowel habits  : NEGATIVE for frequency, dysuria, or hematuria  MUSCULOSKELETAL: NEGATIVE for significant arthralgias or myalgia  NEURO: NEGATIVE for weakness, dizziness or paresthesias  ENDOCRINE: NEGATIVE for temperature intolerance, skin/hair changes  HEME: NEGATIVE for bleeding problems  PSYCHIATRIC: NEGATIVE for changes in mood or affect    Patient Active Problem List    Diagnosis Date Noted     Perforation of right tympanic membrane 11/20/2020     Priority: Medium     Added automatically from request for surgery [id number]       Conductive hearing loss of right ear with unrestricted hearing of left ear 11/20/2020     Priority: Medium     Added automatically from request for surgery [id number]       Impotence of organic origin 06/05/2013     Priority: Medium     CARDIOVASCULAR SCREENING; LDL GOAL LESS THAN 160 06/04/2013     Priority: Medium     "  Past Medical History:   Diagnosis Date     ED (erectile dysfunction)      Insomnia      RLS (restless legs syndrome)      Tobacco use disorder      Past Surgical History:   Procedure Laterality Date     PE TUBES       Current Outpatient Medications   Medication Sig Dispense Refill     gabapentin (NEURONTIN) 600 MG tablet TAKE ONE TABLET BY MOUTH EVERY NIGHT AT BEDTIME - NEED TO BE SEEN IN CLINIC FOR FURTHER REFILLS 30 tablet 0     ibuprofen (ADVIL/MOTRIN) 200 MG tablet Take 800-1,600 mg by mouth every 4 hours as needed for mild pain       tadalafil (CIALIS) 20 MG tablet TAKE ONE TABLET BY MOUTH ONCE DAILY AS NEEDED FOR ERECTILE DYSFUNCTION 6 tablet 3     traZODone (DESYREL) 100 MG tablet TAKE TWO TABLETS BY MOUTH AT BEDTIME 180 tablet 0       Allergies   Allergen Reactions     Amoxicillin Rash     Ceclor [Cefaclor Monohydrate] Rash     Erythromycin Rash     Lorabid [Loracarbef] Rash     Penicillins Rash        Social History     Tobacco Use     Smoking status: Current Every Day Smoker     Packs/day: 0.50     Years: 3.00     Pack years: 1.50     Types: Cigarettes     Smokeless tobacco: Never Used   Substance Use Topics     Alcohol use: Yes     Alcohol/week: 0.0 standard drinks     Comment: 5 drinks a month- weekend use       History   Drug Use Unknown     Comment: past use         Objective     /86   Pulse 82   Temp 97.2  F (36.2  C) (Tympanic)   Ht 1.791 m (5' 10.5\")   Wt 90.7 kg (200 lb)   SpO2 99%   BMI 28.29 kg/m      Physical Exam    GENERAL APPEARANCE: healthy, alert and no distress     EYES: EOMI,  PERRL     HENT: ear canals and TM's normal and nose and mouth without ulcers or lesions     NECK: no adenopathy, no asymmetry, masses, or scars and thyroid normal to palpation     RESP: lungs clear to auscultation - no rales, rhonchi or wheezes     CV: regular rates and rhythm, normal S1 S2, no S3 or S4 and no murmur, click or rub     ABDOMEN:  soft, nontender, no HSM or masses and bowel sounds " normal     MS: extremities normal- no gross deformities noted, no evidence of inflammation in joints, FROM in all extremities.     SKIN: no suspicious lesions or rashes     NEURO: Normal strength and tone, sensory exam grossly normal, mentation intact and speech normal     PSYCH: mentation appears normal. and affect normal/bright     LYMPHATICS: No cervical adenopathy    Recent Labs   Lab Test 08/19/20  1801 07/03/19  1717   HGB 14.7 14.9    281    138   POTASSIUM 4.3 3.8   CR 0.84 0.97        Diagnostics:  No labs were ordered during this visit.   No EKG required, no history of coronary heart disease, significant arrhythmia, peripheral arterial disease or other structural heart disease.      Revised Cardiac Risk Index (RCRI):  The patient has the following serious cardiovascular risks for perioperative complications:   - No serious cardiac risks = 0 points     RCRI Interpretation: 0 points: Class I (very low risk - 0.4% complication rate)           Assessment & Plan   The proposed surgical procedure is considered INTERMEDIATE risk.    Pre-op evaluation  Perforation of right tympanic membrane  Conductive hearing loss of right ear with unrestricted hearing of left ear      Risks and Recommendations:  The patient has the following additional risks and recommendations for perioperative complications:   - No identified additional risk factors other than previously addressed    Medication Instructions:  Patient is to take all scheduled medications on the day of surgery    RECOMMENDATION:  APPROVAL GIVEN to proceed with proposed procedure, without further diagnostic evaluation.    Signed Electronically by: KRISTINA Damico CNP    Copy of this evaluation report is provided to requesting physician.    Preop [facility] Preop Guidelines    Revised Cardiac Risk Index

## 2021-01-15 ENCOUNTER — ANESTHESIA EVENT (OUTPATIENT)
Dept: SURGERY | Facility: AMBULATORY SURGERY CENTER | Age: 36
End: 2021-01-15

## 2021-01-15 ENCOUNTER — HEALTH MAINTENANCE LETTER (OUTPATIENT)
Age: 36
End: 2021-01-15

## 2021-01-15 DIAGNOSIS — Z11.59 ENCOUNTER FOR SCREENING FOR OTHER VIRAL DISEASES: ICD-10-CM

## 2021-01-15 LAB
LABORATORY COMMENT REPORT: NORMAL
SARS-COV-2 RNA RESP QL NAA+PROBE: NEGATIVE
SARS-COV-2 RNA RESP QL NAA+PROBE: NORMAL
SPECIMEN SOURCE: NORMAL
SPECIMEN SOURCE: NORMAL

## 2021-01-15 PROCEDURE — U0003 INFECTIOUS AGENT DETECTION BY NUCLEIC ACID (DNA OR RNA); SEVERE ACUTE RESPIRATORY SYNDROME CORONAVIRUS 2 (SARS-COV-2) (CORONAVIRUS DISEASE [COVID-19]), AMPLIFIED PROBE TECHNIQUE, MAKING USE OF HIGH THROUGHPUT TECHNOLOGIES AS DESCRIBED BY CMS-2020-01-R: HCPCS | Performed by: OTOLARYNGOLOGY

## 2021-01-15 PROCEDURE — U0005 INFEC AGEN DETEC AMPLI PROBE: HCPCS | Performed by: OTOLARYNGOLOGY

## 2021-01-18 ENCOUNTER — ANESTHESIA (OUTPATIENT)
Dept: SURGERY | Facility: AMBULATORY SURGERY CENTER | Age: 36
End: 2021-01-18

## 2021-01-18 ENCOUNTER — HOSPITAL ENCOUNTER (OUTPATIENT)
Facility: AMBULATORY SURGERY CENTER | Age: 36
Discharge: HOME | End: 2021-01-18
Attending: OTOLARYNGOLOGY | Admitting: OTOLARYNGOLOGY
Payer: COMMERCIAL

## 2021-01-18 VITALS
RESPIRATION RATE: 16 BRPM | WEIGHT: 207 LBS | HEART RATE: 86 BPM | DIASTOLIC BLOOD PRESSURE: 70 MMHG | BODY MASS INDEX: 28.98 KG/M2 | SYSTOLIC BLOOD PRESSURE: 124 MMHG | TEMPERATURE: 97.6 F | HEIGHT: 71 IN | OXYGEN SATURATION: 94 %

## 2021-01-18 DIAGNOSIS — H90.11 CONDUCTIVE HEARING LOSS OF RIGHT EAR WITH UNRESTRICTED HEARING OF LEFT EAR: ICD-10-CM

## 2021-01-18 DIAGNOSIS — G89.18 POSTOPERATIVE PAIN: Primary | ICD-10-CM

## 2021-01-18 DIAGNOSIS — H72.91 PERFORATION OF RIGHT TYMPANIC MEMBRANE: ICD-10-CM

## 2021-01-18 PROCEDURE — 69631 REPAIR EARDRUM STRUCTURES: CPT | Mod: RT

## 2021-01-18 PROCEDURE — 88304 TISSUE EXAM BY PATHOLOGIST: CPT | Performed by: PATHOLOGY

## 2021-01-18 RX ORDER — CLINDAMYCIN PHOSPHATE 900 MG/50ML
900 INJECTION, SOLUTION INTRAVENOUS
Status: DISCONTINUED | OUTPATIENT
Start: 2021-01-18 | End: 2021-01-18 | Stop reason: HOSPADM

## 2021-01-18 RX ORDER — OXYCODONE HYDROCHLORIDE 5 MG/1
5 TABLET ORAL EVERY 4 HOURS PRN
Status: DISCONTINUED | OUTPATIENT
Start: 2021-01-18 | End: 2021-01-19 | Stop reason: HOSPADM

## 2021-01-18 RX ORDER — OFLOXACIN 3 MG/ML
5 SOLUTION AURICULAR (OTIC) 2 TIMES DAILY
Qty: 11 ML | Refills: 0 | Status: SHIPPED | OUTPATIENT
Start: 2021-01-18 | End: 2021-02-08

## 2021-01-18 RX ORDER — ACETAMINOPHEN 325 MG/1
975 TABLET ORAL ONCE
Status: COMPLETED | OUTPATIENT
Start: 2021-01-18 | End: 2021-01-18

## 2021-01-18 RX ORDER — PROPOFOL 10 MG/ML
INJECTION, EMULSION INTRAVENOUS CONTINUOUS PRN
Status: DISCONTINUED | OUTPATIENT
Start: 2021-01-18 | End: 2021-01-18

## 2021-01-18 RX ORDER — FENTANYL CITRATE 50 UG/ML
25-50 INJECTION, SOLUTION INTRAMUSCULAR; INTRAVENOUS
Status: DISCONTINUED | OUTPATIENT
Start: 2021-01-18 | End: 2021-01-19 | Stop reason: HOSPADM

## 2021-01-18 RX ORDER — GABAPENTIN 300 MG/1
300 CAPSULE ORAL ONCE
Status: COMPLETED | OUTPATIENT
Start: 2021-01-18 | End: 2021-01-18

## 2021-01-18 RX ORDER — HYDROMORPHONE HYDROCHLORIDE 1 MG/ML
.3-.5 INJECTION, SOLUTION INTRAMUSCULAR; INTRAVENOUS; SUBCUTANEOUS EVERY 10 MIN PRN
Status: DISCONTINUED | OUTPATIENT
Start: 2021-01-18 | End: 2021-01-19 | Stop reason: HOSPADM

## 2021-01-18 RX ORDER — DEXAMETHASONE SODIUM PHOSPHATE 10 MG/ML
10 INJECTION, SOLUTION INTRAMUSCULAR; INTRAVENOUS ONCE
Status: COMPLETED | OUTPATIENT
Start: 2021-01-18 | End: 2021-01-18

## 2021-01-18 RX ORDER — HYDROCODONE BITARTRATE AND ACETAMINOPHEN 5; 325 MG/1; MG/1
1 TABLET ORAL
Status: DISCONTINUED | OUTPATIENT
Start: 2021-01-18 | End: 2021-01-19 | Stop reason: HOSPADM

## 2021-01-18 RX ORDER — ALBUTEROL SULFATE 0.83 MG/ML
2.5 SOLUTION RESPIRATORY (INHALATION) EVERY 4 HOURS PRN
Status: DISCONTINUED | OUTPATIENT
Start: 2021-01-18 | End: 2021-01-18 | Stop reason: HOSPADM

## 2021-01-18 RX ORDER — SODIUM CHLORIDE, SODIUM LACTATE, POTASSIUM CHLORIDE, CALCIUM CHLORIDE 600; 310; 30; 20 MG/100ML; MG/100ML; MG/100ML; MG/100ML
INJECTION, SOLUTION INTRAVENOUS CONTINUOUS
Status: DISCONTINUED | OUTPATIENT
Start: 2021-01-18 | End: 2021-01-18 | Stop reason: HOSPADM

## 2021-01-18 RX ORDER — NALOXONE HYDROCHLORIDE 0.4 MG/ML
0.2 INJECTION, SOLUTION INTRAMUSCULAR; INTRAVENOUS; SUBCUTANEOUS
Status: DISCONTINUED | OUTPATIENT
Start: 2021-01-18 | End: 2021-01-19 | Stop reason: HOSPADM

## 2021-01-18 RX ORDER — DIMENHYDRINATE 50 MG/ML
25 INJECTION, SOLUTION INTRAMUSCULAR; INTRAVENOUS
Status: DISCONTINUED | OUTPATIENT
Start: 2021-01-18 | End: 2021-01-19 | Stop reason: HOSPADM

## 2021-01-18 RX ORDER — LIDOCAINE 40 MG/G
CREAM TOPICAL
Status: DISCONTINUED | OUTPATIENT
Start: 2021-01-18 | End: 2021-01-18 | Stop reason: HOSPADM

## 2021-01-18 RX ORDER — NALOXONE HYDROCHLORIDE 0.4 MG/ML
0.4 INJECTION, SOLUTION INTRAMUSCULAR; INTRAVENOUS; SUBCUTANEOUS
Status: DISCONTINUED | OUTPATIENT
Start: 2021-01-18 | End: 2021-01-19 | Stop reason: HOSPADM

## 2021-01-18 RX ORDER — ACETAMINOPHEN 325 MG/1
650 TABLET ORAL
Status: DISCONTINUED | OUTPATIENT
Start: 2021-01-18 | End: 2021-01-19 | Stop reason: HOSPADM

## 2021-01-18 RX ORDER — GLYCOPYRROLATE 0.2 MG/ML
INJECTION, SOLUTION INTRAMUSCULAR; INTRAVENOUS PRN
Status: DISCONTINUED | OUTPATIENT
Start: 2021-01-18 | End: 2021-01-18

## 2021-01-18 RX ORDER — KETOROLAC TROMETHAMINE 30 MG/ML
30 INJECTION, SOLUTION INTRAMUSCULAR; INTRAVENOUS EVERY 6 HOURS PRN
Status: DISCONTINUED | OUTPATIENT
Start: 2021-01-18 | End: 2021-01-19 | Stop reason: HOSPADM

## 2021-01-18 RX ORDER — LORAZEPAM 2 MG/ML
.5-1 INJECTION INTRAMUSCULAR
Status: DISCONTINUED | OUTPATIENT
Start: 2021-01-18 | End: 2021-01-18 | Stop reason: HOSPADM

## 2021-01-18 RX ORDER — PROPOFOL 10 MG/ML
INJECTION, EMULSION INTRAVENOUS PRN
Status: DISCONTINUED | OUTPATIENT
Start: 2021-01-18 | End: 2021-01-18

## 2021-01-18 RX ORDER — FENTANYL CITRATE 50 UG/ML
INJECTION, SOLUTION INTRAMUSCULAR; INTRAVENOUS PRN
Status: DISCONTINUED | OUTPATIENT
Start: 2021-01-18 | End: 2021-01-18

## 2021-01-18 RX ORDER — LIDOCAINE HYDROCHLORIDE 20 MG/ML
INJECTION, SOLUTION INFILTRATION; PERINEURAL PRN
Status: DISCONTINUED | OUTPATIENT
Start: 2021-01-18 | End: 2021-01-18

## 2021-01-18 RX ORDER — HYDROCODONE BITARTRATE AND ACETAMINOPHEN 5; 325 MG/1; MG/1
1 TABLET ORAL EVERY 6 HOURS PRN
Qty: 20 TABLET | Refills: 0 | Status: SHIPPED | OUTPATIENT
Start: 2021-01-18 | End: 2021-02-22

## 2021-01-18 RX ORDER — HYDROCODONE BITARTRATE AND ACETAMINOPHEN 5; 325 MG/1; MG/1
1 TABLET ORAL EVERY 6 HOURS PRN
Qty: 10 TABLET | Refills: 0 | Status: SHIPPED | OUTPATIENT
Start: 2021-01-18 | End: 2021-01-18

## 2021-01-18 RX ORDER — CLINDAMYCIN PHOSPHATE 900 MG/50ML
900 INJECTION, SOLUTION INTRAVENOUS SEE ADMIN INSTRUCTIONS
Status: DISCONTINUED | OUTPATIENT
Start: 2021-01-18 | End: 2021-01-18 | Stop reason: HOSPADM

## 2021-01-18 RX ORDER — ONDANSETRON 2 MG/ML
4 INJECTION INTRAMUSCULAR; INTRAVENOUS EVERY 30 MIN PRN
Status: DISCONTINUED | OUTPATIENT
Start: 2021-01-18 | End: 2021-01-19 | Stop reason: HOSPADM

## 2021-01-18 RX ORDER — MEPERIDINE HYDROCHLORIDE 25 MG/ML
12.5 INJECTION INTRAMUSCULAR; INTRAVENOUS; SUBCUTANEOUS
Status: DISCONTINUED | OUTPATIENT
Start: 2021-01-18 | End: 2021-01-19 | Stop reason: HOSPADM

## 2021-01-18 RX ORDER — SODIUM CHLORIDE, SODIUM LACTATE, POTASSIUM CHLORIDE, CALCIUM CHLORIDE 600; 310; 30; 20 MG/100ML; MG/100ML; MG/100ML; MG/100ML
INJECTION, SOLUTION INTRAVENOUS CONTINUOUS
Status: DISCONTINUED | OUTPATIENT
Start: 2021-01-18 | End: 2021-01-19 | Stop reason: HOSPADM

## 2021-01-18 RX ORDER — ONDANSETRON 4 MG/1
4 TABLET, ORALLY DISINTEGRATING ORAL EVERY 30 MIN PRN
Status: DISCONTINUED | OUTPATIENT
Start: 2021-01-18 | End: 2021-01-19 | Stop reason: HOSPADM

## 2021-01-18 RX ORDER — ONDANSETRON 2 MG/ML
INJECTION INTRAMUSCULAR; INTRAVENOUS PRN
Status: DISCONTINUED | OUTPATIENT
Start: 2021-01-18 | End: 2021-01-18

## 2021-01-18 RX ORDER — FENTANYL CITRATE 50 UG/ML
25-50 INJECTION, SOLUTION INTRAMUSCULAR; INTRAVENOUS
Status: DISCONTINUED | OUTPATIENT
Start: 2021-01-18 | End: 2021-01-18 | Stop reason: HOSPADM

## 2021-01-18 RX ADMIN — DEXAMETHASONE SODIUM PHOSPHATE 10 MG: 10 INJECTION, SOLUTION INTRAMUSCULAR; INTRAVENOUS at 09:19

## 2021-01-18 RX ADMIN — Medication 0.5 MG: at 10:49

## 2021-01-18 RX ADMIN — ONDANSETRON 4 MG: 2 INJECTION INTRAMUSCULAR; INTRAVENOUS at 09:10

## 2021-01-18 RX ADMIN — GABAPENTIN 300 MG: 300 CAPSULE ORAL at 07:55

## 2021-01-18 RX ADMIN — ACETAMINOPHEN 975 MG: 325 TABLET ORAL at 07:55

## 2021-01-18 RX ADMIN — Medication 0.5 MG: at 11:49

## 2021-01-18 RX ADMIN — PROPOFOL 100 MCG/KG/MIN: 10 INJECTION, EMULSION INTRAVENOUS at 09:16

## 2021-01-18 RX ADMIN — FENTANYL CITRATE 50 MCG: 50 INJECTION, SOLUTION INTRAMUSCULAR; INTRAVENOUS at 11:58

## 2021-01-18 RX ADMIN — OXYCODONE HYDROCHLORIDE 5 MG: 5 TABLET ORAL at 12:11

## 2021-01-18 RX ADMIN — CLINDAMYCIN PHOSPHATE 900 MG: 900 INJECTION, SOLUTION INTRAVENOUS at 09:20

## 2021-01-18 RX ADMIN — GLYCOPYRROLATE 0.2 MG: 0.2 INJECTION, SOLUTION INTRAMUSCULAR; INTRAVENOUS at 09:10

## 2021-01-18 RX ADMIN — FENTANYL CITRATE 25 MCG: 50 INJECTION, SOLUTION INTRAMUSCULAR; INTRAVENOUS at 12:07

## 2021-01-18 RX ADMIN — PROPOFOL 200 MG: 10 INJECTION, EMULSION INTRAVENOUS at 09:15

## 2021-01-18 RX ADMIN — FENTANYL CITRATE 100 MCG: 50 INJECTION, SOLUTION INTRAMUSCULAR; INTRAVENOUS at 09:10

## 2021-01-18 RX ADMIN — LIDOCAINE HYDROCHLORIDE 100 MG: 20 INJECTION, SOLUTION INFILTRATION; PERINEURAL at 09:15

## 2021-01-18 RX ADMIN — FENTANYL CITRATE 25 MCG: 50 INJECTION, SOLUTION INTRAMUSCULAR; INTRAVENOUS at 12:09

## 2021-01-18 RX ADMIN — SODIUM CHLORIDE, SODIUM LACTATE, POTASSIUM CHLORIDE, CALCIUM CHLORIDE: 600; 310; 30; 20 INJECTION, SOLUTION INTRAVENOUS at 09:04

## 2021-01-18 ASSESSMENT — MIFFLIN-ST. JEOR: SCORE: 1886.08

## 2021-01-18 ASSESSMENT — LIFESTYLE VARIABLES: TOBACCO_USE: 1

## 2021-01-18 NOTE — ANESTHESIA PREPROCEDURE EVALUATION
Anesthesia Pre-Procedure Evaluation    Patient: Germain Iraheta   MRN: [id number] : 1985        Preoperative Diagnosis: Perforation of right tympanic membrane [H72.91]  Conductive hearing loss of right ear with unrestricted hearing of left ear [H90.11]   Procedure : Procedure(s):  RIGHT TYMPANOPLASTY WITH CARTILAGE BACKING, POSSIBLE OSSICULAR CHAIN RECONSTRUCTION     Past Medical History:   Diagnosis Date     ED (erectile dysfunction)      Insomnia      RLS (restless legs syndrome)      Tobacco use disorder       Past Surgical History:   Procedure Laterality Date     PE TUBES        Allergies   Allergen Reactions     Amoxicillin Rash     Ceclor [Cefaclor Monohydrate] Rash     Erythromycin Rash     Lorabid [Loracarbef] Rash     Penicillins Rash      Social History     Tobacco Use     Smoking status: Current Every Day Smoker     Packs/day: 0.50     Years: 3.00     Pack years: 1.50     Types: Cigarettes     Smokeless tobacco: Never Used   Substance Use Topics     Alcohol use: Yes     Alcohol/week: 0.0 standard drinks     Comment: 5 drinks a month- weekend use      Wt Readings from Last 1 Encounters:   21 90.7 kg (200 lb)        Anesthesia Evaluation   Pt has had prior anesthetic.     No history of anesthetic complications       ROS/MED HX  ENT/Pulmonary:     (+) tobacco use, Current use,     Neurologic:  - neg neurologic ROS     Cardiovascular:  - neg cardiovascular ROS     METS/Exercise Tolerance: >4 METS    Hematologic:  - neg hematologic  ROS     Musculoskeletal:  - neg musculoskeletal ROS     GI/Hepatic:  - neg GI/hepatic ROS     Renal/Genitourinary:  - neg Renal ROS     Endo:  - neg endo ROS     Psychiatric/Substance Use:  - neg psychiatric ROS     Infectious Disease:       Malignancy:  - neg malignancy ROS     Other:  - neg other ROS          Physical Exam    Airway  airway exam normal           Respiratory Devices and Support         Dental  no notable dental history         Cardiovascular    cardiovascular exam normal          Pulmonary   pulmonary exam normal                OUTSIDE LABS:  CBC:   Lab Results   Component Value Date    WBC 6.0 08/19/2020    WBC 6.5 07/03/2019    HGB 14.7 08/19/2020    HGB 14.9 07/03/2019    HCT 43.0 08/19/2020    HCT 45.3 07/03/2019     08/19/2020     07/03/2019     BMP:   Lab Results   Component Value Date     08/19/2020     07/03/2019    POTASSIUM 4.3 08/19/2020    POTASSIUM 3.8 07/03/2019    CHLORIDE 107 08/19/2020    CHLORIDE 103 07/03/2019    CO2 25 08/19/2020    CO2 28 07/03/2019    BUN 19 08/19/2020    BUN 13 07/03/2019    CR 0.84 08/19/2020    CR 0.97 07/03/2019    GLC 88 08/19/2020    GLC 85 07/03/2019     COAGS: No results found for: PTT, INR, FIBR  POC: No results found for: BGM, HCG, HCGS  HEPATIC:   Lab Results   Component Value Date    ALBUMIN 4.0 08/19/2020    PROTTOTAL 7.3 08/19/2020    ALT 34 08/19/2020    AST 35 08/19/2020    ALKPHOS 61 08/19/2020    BILITOTAL 0.5 08/19/2020     OTHER:   Lab Results   Component Value Date    AUSTIN 8.8 08/19/2020    TSH 1.11 08/12/2019       Anesthesia Plan     History & Physical Review      ASA Status:  2. NPO Status:  NPO Appropriate. .  Plan for General with Intravenous induction. Device: ETT Maintenance will be Balanced.         PONV prophylaxis:  Ondansetron (or other 5HT-3), Dexamethasone or Solumedrol and Background Propofol Infusion.       Consents  Anesthesia Plan(s) and associated risks, benefits, and realistic alternatives discussed.    Questions answered and patient/representative(s) expressed understanding.    Discussed with:  Patient.       Extended Intubation/Ventilatory Support Discussed No No     Use of blood products discussed: No.          Postoperative Care  Postoperative pain management: IV analgesics and Oral pain medications.           Justin Michel MD

## 2021-01-18 NOTE — DISCHARGE INSTRUCTIONS
"1. Resume your home medications. Take pain medications as indicated. Use docusate to avoid constipation. Start your ear drops in 1 week after surgery and use until your follow up.    2. Wound care  * No shower for 48 hours after surgery.  * Change the cotton ball in your ear as needed for drainage.  It's normal to expect some drainage from your ear for the first few days after surgery.    3. Use a cotton ball coated with vasoline to keep water out of ear canal.    4. For the next week, no heavy lifting more than 5 pounds, no strenuous activities. No nose blowing. Sneeze with your mouth open.    5. Please call MD or come to the ED for shortness of breath, trouble breathing, inability to tolerate liquids, intractable dizziness, or signs of infection such as fevers or redness.      Call the [phone] clinic number if you have any questions and concerns during the day and call [phone] at night and ask for \"ENT resident on call\".     6. Follow up with Dr. Peña as previously scheduled.      [facility]  Home Care Following Anesthesia  For 24 hours after surgery:  1. Get plenty of rest.  A responsible adult must stay with you for at least 24 hours after you leave the surgery center.  2. Do not drive or use heavy equipment.  If you have weakness or tingling, don't drive or use heavy equipment until this feeling goes away.   3. Do not drink alcohol.   4. Avoid strenuous or risky activities.  Ask for help when climbing stairs.  5. You may feel lightheaded.  IF so, sit for a few minutes before standing.  Have someone help you get up.   6. If you have nausea (feel sick to your stomach): Drink only clear liquids such as apple juice, ginger ale, broth or 7-Up.  Rest may also help.  Be sure to drink enough fluids.  Move to a regular diet as you feel able.   7. You may have a slight fever.  Call the doctor if your fever is over 100 F (37.7 C) (taken under the tongue) or lasts longer " "than 24 hours.  8. You may have a dry mouth, a sore throat, muscle aches or trouble sleeping. These should go away after 24 hours.  9. Do not make important or legal decisions.        Today you received a Marcaine or bupivacaine block to numb the nerves near your surgery site.  This is a block using local anesthetic or \"numbing\" medication injected around the nerves to anesthetize or \"numb\" the area supplied by those nerves.  This block is injected into the muscle layer near your surgical site.  The medication may numb the location where you had surgery for 6-18 hours, but may last up to 24 hours.  If your surgical site is an arm or leg you should be careful with your affected limb, since it is possible to injure your limb without being aware of it due to the numbing.  Until full feeling returns, you should guard against bumping or hitting your limb, and avoid extreme hot or cold temperatures on the skin.  As the block wears off, the feeling will return as a tingling or prickly sensation near your surgical site.  You will experience more discomfort from your incision as the feeling returns.  You may want to take a pain pill (a narcotic or Tylenol if this was prescribed by your surgeon) when you start to experience mild pain before the pain beccomes more severe.  If your pain medications do not control your pain you should notifiy your surgeon.    Tips for taking pain medications  To get the best pain relief possible, remember these points:    Take pain medications as directed, before pain becomes severe.    Pain medication can upset your stomach: taking it with food may help.    Constipation is a common side effect of pain medication. Drink plenty of  fluids.    Eat foods high in fiber. Take a stool softener if recommended by your doctor or pharmacist.    Do not drink alcohol, drive or operate machinery while taking pain medications.    Ask about other ways to control pain, such as with heat, ice or " relaxation.    Tylenol/Acetaminophen Consumption  To help encourage the safe use of acetaminophen, the makers of TYLENOL  have lowered the maximum daily dose for single-ingredient Extra Strength TYLENOL  (acetaminophen) products sold in the [place] from 8 pills per day (4,000 mg) to 6 pills per day (3,000 mg). The dosing interval has also changed from 2 pills every 4-6 hours to 2 pills every 6 hours.    If you feel your pain relief is insufficient, you may take Tylenol/Acetaminophen in addition to your narcotic pain medication.     Be careful not to exceed 3,000 mg of Tylenol/Acetaminophen in a 24 hour period from all sources.    If you are taking extra strength Tylenol/acetaminophen (500 mg), the maximum dose is 6 tablets in 24 hours.    If you are taking regular strength acetaminophen (325 mg), the maximum dose is 9 tablets in 24 hours.    Call a doctor for any of the followin. Signs of infection (fever, growing tenderness at the surgery site, a large amount of drainage or bleeding, severe pain, foul-smelling drainage, redness, swelling).  2. It has been over 8 to 10 hours since surgery and you are still not able to urinate (pass water).  3. Headache for over 24 hours.  4. Numbness, tingling or weakness the day after surgery (if you had spinal anesthesia).  5. Signs of Covid-19 infection (temperature over 100 degrees, shortness of breath, cough, loss of taste/smell, generalized body aches, persistent headache, chills, sore throat, nausea/vomiting/diarrhea)  Your doctor is:  Dr. Sandra Peña, ENT Otolaryngology: [phone]  Or dial [phone] and ask for the resident on call for:  ENT Otolaryngology  For emergency care, call the:  [facility]:  [phone] (TTY for hearing impaired: [phone])

## 2021-01-18 NOTE — OP NOTE
Date of service:  1/18/21    Preoperative diagnosis:  Right tympanic membrane perforation    Postoperative diagnosis:  Right tympanic membrane perforation and cholesteatoma    Procedures:  1.  Right tympanoplasty with cartilage backing  2.  Facial nerve monitoring x 2 hours    Surgeon:  Sandra Peña MD    Fellow:  Liborio Oconnor MD    Resident:  Anayeli Li MD    Anesthesia:  General endotracheal    EBL:  10 cc    Specimens:  Right middle ear contents    Drains:  none    Complications:  None    Findings:  80% TM perforation with epithelium wrapped onto the eroded end of the long process of the incus and thick tissue within the sinus tympani, stapes intact and mobile.    Indications:  Germain Iraheta is a 35 year old male who was found to have a right tympanic membrane perforation.  Risks and benefits of the above procedure were had with the patient and informed consent was obtained in the clinic.  This was confirmed in the preoperative area.    Procedure:  The patient was brought into the operating room and placed supine on the operating room table.  A brief had been performed already.  General anesthesia was induced and endotracheal intubation was performed.  The patient was turned 180 degrees.  The area behind the right ear was shaved and marked.  1:100,000 epinephrine was injected into the planned incision as well as the ear canal.  Facial nerve monitor electrodes were placed on the right face and connected to the nerve monitor.  Impedances were checked and a tap test was performed.  The monitor was used for the entirety of the case.  The patient was then prepped and draped in standard surgical fashion.  Time Out was performed with identification of the patient, side of the procedure and procedures to be done.    Postauricular incision was made down to the temporalis muscle.  Loose areolar temporalis fascia graft was obtained and prepared for grafting purposes.  Standard T-incision was made.  The soft tissues  were elevated off the mastoid cortex to the posterior ear canal. The operating microscope was brought into position and used for the remainder of the case. Attention was turned to the posterior ear canal skin.  This was elevated medially towards the annulus.  Canal incisions were made and the tympanic membrane was inspected.  He is noted to have a large 80% perforation with a rim of annulus remaining anteriorly and inferiorly, none posteriorly, TM still remaining superiorly. The middle ear is clear but there is not an obvious end of the long process of the incus and scar tissue over the stapes capitulum. No obvious epithelium. The perforation was rimmed. During rimming, the chorda tympani was encountered and was intact. The tympanic membrane was elevated in an inferior to superior fashion. The middle ear was entered inferiorly. The hypotympanum is clear. As the TM was elevated superiorly, there was noted to be epithelium overlying the end of the long process of the incus which was not attached to the stapes capitulum. The epithelium was removed from the end of the incus. Thick scar bands were lysed around the stapes suprastructure. The suprastructure was intact and mobile after the bands were lysed and removed. There is noted to be thick middle ear mucosa with white dots overlying the cochlear promontory which was removed. There was also thick tissue with epithelium on the surface within the sinus tympani and this was also removed. This was all sent to pathology. At this point, the middle ear appeared clear with a remaining long process of the incus that is just short of the stapes and an intact stapes. Conchal bowl cartilage had been harvested and prepared on the back table. The middle ear was filled with gelfoam. The fascia graft was used in an underlay fashion to repair the tympanic membrane anteriorly. The cartilage graft was used to repair the perforation posteriorly and some of the graft was placed under the  malleus. The grafts and tympanomeatal flap were placed back in anatomic position.  Gelfoam was placed on top of the repair.  The T-incision was closed and the postauricular incision closed in multiple layers.  The ear canal was inspected under the microscope and the lateral flap put back into position.  The ear canal was then filled with gelfoam.    The patient tolerated the procedure well and all counts were correct.  The facial nerve electrodes were removed and a Dana dressing placed.  The patient was then returned to anesthesia, awakened, extubated and taken to the PACU in stable condition.    Sandra STONE MD, was scrubbed during all portions of the procedure.

## 2021-01-18 NOTE — ANESTHESIA POSTPROCEDURE EVALUATION
Patient: Germain Iraheta    Procedure(s):  RIGHT TYMPANOPLASTY WITH CARTILAGE BACKING    Diagnosis:Perforation of right tympanic membrane [H72.91]  Conductive hearing loss of right ear with unrestricted hearing of left ear [H90.11]  Diagnosis Additional Information: No value filed.    Anesthesia Type:  General    Note:  Disposition: Outpatient   Postop Pain Control: Uneventful            Sign Out: Well controlled pain   PONV: No   Neuro/Psych: Uneventful            Sign Out: Acceptable/Baseline neuro status   Airway/Respiratory: Uneventful            Sign Out: Acceptable/Baseline resp. status   CV/Hemodynamics: Uneventful            Sign Out: Acceptable CV status   Other NRE:    DID A NON-ROUTINE EVENT OCCUR?          Last vitals:  Vitals:    01/18/21 1230 01/18/21 1242 01/18/21 1315   BP: (!) 149/103 (!) 134/91 124/70   Pulse: 80 86    Resp: 14 14 16   Temp:  36.4  C (97.5  F) 36.4  C (97.6  F)   SpO2: 95% 94% 94%       Electronically Signed By: Javier Rahman MD  January 18, 2021  3:13 PM

## 2021-01-19 LAB — COPATH REPORT: NORMAL

## 2021-02-03 NOTE — PATIENT INSTRUCTIONS
1. You were seen in the ENT Clinic today by Dr. Peña.  If you have any questions or concerns after your appointment, please call   - Option 1: ENT Clinic: [phone]   - Option 2: Betty (Dr. Peña's Nurse): [phone]    2.   Plan to return to clinic in 1 month with hearing test    3. Ciprodex in the right ear- 5 drops twice daily x 2 weeks    Betty Funes LPN  [facility] - Otolaryngology

## 2021-02-04 ENCOUNTER — OFFICE VISIT (OUTPATIENT)
Dept: OTOLARYNGOLOGY | Facility: CLINIC | Age: 36
End: 2021-02-04
Payer: COMMERCIAL

## 2021-02-04 ENCOUNTER — TELEPHONE (OUTPATIENT)
Dept: OTOLARYNGOLOGY | Facility: CLINIC | Age: 36
End: 2021-02-04

## 2021-02-04 VITALS
HEIGHT: 71 IN | BODY MASS INDEX: 27.96 KG/M2 | HEART RATE: 120 BPM | WEIGHT: 199.74 LBS | TEMPERATURE: 97.6 F | OXYGEN SATURATION: 98 %

## 2021-02-04 DIAGNOSIS — H69.91 DYSFUNCTION OF RIGHT EUSTACHIAN TUBE: Primary | ICD-10-CM

## 2021-02-04 DIAGNOSIS — H69.91 DYSFUNCTION OF RIGHT EUSTACHIAN TUBE: ICD-10-CM

## 2021-02-04 PROCEDURE — 99024 POSTOP FOLLOW-UP VISIT: CPT | Performed by: OTOLARYNGOLOGY

## 2021-02-04 RX ORDER — CIPROFLOXACIN AND DEXAMETHASONE 3; 1 MG/ML; MG/ML
5 SUSPENSION/ DROPS AURICULAR (OTIC) 2 TIMES DAILY
Qty: 7.5 ML | Refills: 0 | Status: SHIPPED | OUTPATIENT
Start: 2021-02-04 | End: 2021-02-04

## 2021-02-04 RX ORDER — PREDNISOLONE ACETATE 10 MG/ML
5 SUSPENSION/ DROPS OPHTHALMIC 2 TIMES DAILY
Qty: 5 ML | Refills: 0 | Status: SHIPPED | OUTPATIENT
Start: 2021-02-04 | End: 2021-02-18

## 2021-02-04 RX ORDER — CIPROFLOXACIN HYDROCHLORIDE 3.5 MG/ML
5 SOLUTION/ DROPS TOPICAL 2 TIMES DAILY
Qty: 10 ML | Refills: 0 | Status: SHIPPED | OUTPATIENT
Start: 2021-02-04 | End: 2021-02-18

## 2021-02-04 ASSESSMENT — PAIN SCALES - GENERAL: PAINLEVEL: SEVERE PAIN (6)

## 2021-02-04 ASSESSMENT — MIFFLIN-ST. JEOR: SCORE: 1853.13

## 2021-02-04 NOTE — NURSING NOTE
"Chief Complaint   Patient presents with     RECHECK     3 weeks post op       Pulse 120, temperature 97.6  F (36.4  C), temperature source Temporal, height 1.803 m (5' 11\"), weight 90.6 kg (199 lb 11.8 oz), SpO2 98 %.    Marybeth Lu, EMT    "

## 2021-02-04 NOTE — LETTER
2/4/2021      RE: Germain Iraheta  [address] [facility] [address]       Germain Iraheta is seen for his first postoperative visit after right cartilage tympanoplasty for perforation as well as cholesteatoma on the end of the eroded long process of the incus which was taken off the incus. He reports he is doing well with no otorrhea and minimal pain. Hearing is down.    Physical examination:  male in no acute distress.  Alert and answering questions appropriately.  HB 1/6 bilaterally.  Right ear examined under the microscope. Gelfoam and Bacitracin removed from the ear canal, incision line with some granulation tissue, TM intact with the graft slightly edematous.    Assessment and plan:  Healing as expected. We'll change him to Ciprodex and he will continue dry ear precautions and we'll see him back in a month with an audiogram.        Sandra Peña MD

## 2021-02-04 NOTE — LETTER
2/4/2021       RE: Germain Iraheta  [address] [facility] [address]     Dear Colleague,    Thank you for referring your patient, Germain Iraheta, to the [facility] [place] at [facility]. Please see a copy of my visit note below.    Germain Iraheta is seen for his first postoperative visit after right cartilage tympanoplasty for perforation as well as cholesteatoma on the end of the eroded long process of the incus which was taken off the incus. He reports he is doing well with no otorrhea and minimal pain. Hearing is down.    Physical examination:  male in no acute distress.  Alert and answering questions appropriately.  HB 1/6 bilaterally.  Right ear examined under the microscope. Gelfoam and Bacitracin removed from the ear canal, incision line with some granulation tissue, TM intact with the graft slightly edematous.    Assessment and plan:  Healing as expected. We'll change him to Ciprodex and he will continue dry ear precautions and we'll see him back in a month with an audiogram.        Again, thank you for allowing me to participate in the care of your patient.      Sincerely,    Sandra Peña MD

## 2021-02-04 NOTE — TELEPHONE ENCOUNTER
[facility] Call Center    Phone Message    May a detailed message be left on voicemail: yes     Reason for Call: Medication Question or concern regarding medication   Prescription Clarification  Name of Medication: ciprofloxacin-dexamethasone (CIPRODEX) 0.3-0.1 % otic suspension  Prescribing Provider: Dr. Peña   Pharmacy: [facility] [place], [place] - [address]   What on the order needs clarification? Pharmacy looking for alternative for Ciprodex due to cost. Suggests splitting it into Ofloxacin and Prednisolone. Please update order or call back at [phone] (direct line) with any questions. Thanks!    Action Taken: Message routed to:  [facility] E[facility]    Travel Screening: Not Applicable

## 2021-02-18 ENCOUNTER — TELEPHONE (OUTPATIENT)
Dept: OTOLARYNGOLOGY | Facility: CLINIC | Age: 36
End: 2021-02-18

## 2021-02-18 NOTE — TELEPHONE ENCOUNTER
[facility] Call Center    Phone Message    May a detailed message be left on voicemail: yes     Reason for Call: Other: Pt states he needs a new return to work letter with specific wording. Please call Pt to verify exactly what his employer needs the letter to say. Thank you.     Action Taken: Message routed to:  [facility] ENT    Travel Screening: Not Applicable

## 2021-02-22 ENCOUNTER — VIRTUAL VISIT (OUTPATIENT)
Dept: FAMILY MEDICINE | Facility: CLINIC | Age: 36
End: 2021-02-22
Payer: COMMERCIAL

## 2021-02-22 DIAGNOSIS — G47.00 PERSISTENT DISORDER OF INITIATING OR MAINTAINING SLEEP: ICD-10-CM

## 2021-02-22 DIAGNOSIS — G47.62 NOCTURNAL LEG CRAMPS: ICD-10-CM

## 2021-02-22 DIAGNOSIS — N52.9 IMPOTENCE OF ORGANIC ORIGIN: ICD-10-CM

## 2021-02-22 DIAGNOSIS — R25.2 BILATERAL LEG CRAMPS: ICD-10-CM

## 2021-02-22 PROCEDURE — 99214 OFFICE O/P EST MOD 30 MIN: CPT | Mod: 95 | Performed by: NURSE PRACTITIONER

## 2021-02-22 RX ORDER — TRAZODONE HYDROCHLORIDE 100 MG/1
TABLET ORAL
Qty: 180 TABLET | Refills: 1 | Status: SHIPPED | OUTPATIENT
Start: 2021-02-22 | End: 2021-10-01

## 2021-02-22 RX ORDER — TADALAFIL 20 MG/1
TABLET ORAL
Qty: 6 TABLET | Refills: 3 | Status: SHIPPED | OUTPATIENT
Start: 2021-02-22 | End: 2021-03-17

## 2021-02-22 RX ORDER — GABAPENTIN 600 MG/1
TABLET ORAL
Qty: 90 TABLET | Refills: 1 | Status: SHIPPED | OUTPATIENT
Start: 2021-02-22 | End: 2021-10-01

## 2021-02-22 NOTE — PATIENT INSTRUCTIONS
Refills sent.  Consult with sleep medicine.      Our Clinic hours are:  Mondays    7:20 am - 7 pm  Tues -  Fri  7:20 am - 5 pm    Clinic Phone: [phone]    The clinic lab opens at 7:30 am Mon - Fri and appointments are required.    [facility]. [phone]  Monday  8 am - 7pm  Tues - Fri 8 am - 5:30 pm

## 2021-02-22 NOTE — PROGRESS NOTES
Khurram is a 35 year old who is being evaluated via a billable telephone visit.      What phone number would you like to be contacted at? [phone]  How would you like to obtain your AVS? MyChart    Assessment & Plan     Persistent disorder of initiating or maintaining sleep    - traZODone (DESYREL) 100 MG tablet; TAKE TWO TABLETS BY MOUTH AT BEDTIME  - SLEEP EVALUATION & MANAGEMENT REFERRAL - ADULT -[facility] [place] [phone] (Age 15 and up); Future    Impotence of organic origin    - tadalafil (CIALIS) 20 MG tablet; TAKE ONE TABLET BY MOUTH ONCE DAILY AS NEEDED FOR ERECTILE DYSFUNCTION    Nocturnal leg cramps    - gabapentin (NEURONTIN) 600 MG tablet; TAKE ONE TABLET BY MOUTH EVERY NIGHT AT BEDTIME - NEED TO BE SEEN IN CLINIC FOR FURTHER REFILLS    Bilateral leg cramps    - gabapentin (NEURONTIN) 600 MG tablet; TAKE ONE TABLET BY MOUTH EVERY NIGHT AT BEDTIME - NEED TO BE SEEN IN CLINIC FOR FURTHER REFILLS  56}     Tobacco Cessation:   reports that he has been smoking cigarettes. He has a 1.50 pack-year smoking history. He has never used smokeless tobacco.      See Patient Instructions  Patient Instructions   Refills sent.  Consult with sleep medicine.      Our Clinic hours are:  Mondays    7:20 am - 7 pm  Tues -  Fri  7:20 am - 5 pm    Clinic Phone: [phone]    The clinic lab opens at 7:30 am Mon - Fri and appointments are required.    [facility]  Ph. [phone]  Monday  8 am - 7pm  Tues - Fri 8 am - 5:30 pm             Return in about 6 months (around 8/22/2021) for or sooner if symptoms persist or worsen, Physical Exam, Med Check, Lab Work.    KRISTINA Jones CNP  [facility]    Subjective   Khurram is a 35 year old who presents for the following health issues  accompanied by his self:    HPI       Medication Followup of trazodone    Taking Medication as prescribed: yes    Side Effects:  Groggy in the morning     Medication Helping  Symptoms:  yes     Medication Followup of gabapentin     Taking Medication as prescribed: yes     Side Effects:  None    Medication Helping Symptoms:  yes     Medication Followup of cialis    Taking Medication as prescribed: NO-needs to take 2-3 tabs, would like to increase dose if possible     Side Effects:  None    Medication Helping Symptoms:  Yes if takes more than one tab        Review of Systems   See above      Objective           Vitals:  No vitals were obtained today due to virtual visit.    Physical Exam   healthy, alert and no distress  PSYCH: Alert and oriented times 3; coherent speech, normal   rate and volume, able to articulate logical thoughts, able   to abstract reason, no tangential thoughts, no hallucinations   or delusions  His affect is normal  RESP: No cough, no audible wheezing, able to talk in full sentences  Remainder of exam unable to be completed due to telephone visits        Phone call duration: 12 minutes

## 2021-03-03 NOTE — PROGRESS NOTES
Germain Iraheta is seen for his first postoperative visit after right cartilage tympanoplasty for perforation as well as cholesteatoma on the end of the eroded long process of the incus which was taken off the incus. He reports he is doing well with no otorrhea and minimal pain. Hearing is down.    Physical examination:  male in no acute distress.  Alert and answering questions appropriately.  HB 1/6 bilaterally.  Right ear examined under the microscope. Gelfoam and Bacitracin removed from the ear canal, incision line with some granulation tissue, TM intact with the graft slightly edematous.    Assessment and plan:  Healing as expected. We'll change him to Ciprodex and he will continue dry ear precautions and we'll see him back in a month with an audiogram.

## 2021-03-09 ENCOUNTER — APPOINTMENT (OUTPATIENT)
Dept: GENERAL RADIOLOGY | Facility: CLINIC | Age: 36
End: 2021-03-09
Attending: NURSE PRACTITIONER
Payer: COMMERCIAL

## 2021-03-09 ENCOUNTER — HOSPITAL ENCOUNTER (EMERGENCY)
Facility: CLINIC | Age: 36
Discharge: HOME | End: 2021-03-09
Attending: NURSE PRACTITIONER | Admitting: NURSE PRACTITIONER
Payer: COMMERCIAL

## 2021-03-09 VITALS
SYSTOLIC BLOOD PRESSURE: 142 MMHG | RESPIRATION RATE: 18 BRPM | TEMPERATURE: 97.8 F | WEIGHT: 199 LBS | BODY MASS INDEX: 27.75 KG/M2 | DIASTOLIC BLOOD PRESSURE: 83 MMHG | HEART RATE: 70 BPM | OXYGEN SATURATION: 97 %

## 2021-03-09 DIAGNOSIS — M79.672 LEFT FOOT PAIN: ICD-10-CM

## 2021-03-09 PROCEDURE — 73630 X-RAY EXAM OF FOOT: CPT | Mod: LT

## 2021-03-09 PROCEDURE — 99213 OFFICE O/P EST LOW 20 MIN: CPT | Performed by: NURSE PRACTITIONER

## 2021-03-09 PROCEDURE — G0463 HOSPITAL OUTPT CLINIC VISIT: HCPCS | Performed by: NURSE PRACTITIONER

## 2021-03-09 PROCEDURE — 250N000013 HC RX MED GY IP 250 OP 250 PS 637: Performed by: NURSE PRACTITIONER

## 2021-03-09 RX ORDER — IBUPROFEN 200 MG
800 TABLET ORAL ONCE
Status: COMPLETED | OUTPATIENT
Start: 2021-03-09 | End: 2021-03-09

## 2021-03-09 RX ORDER — NAPROXEN 500 MG/1
500 TABLET ORAL 2 TIMES DAILY WITH MEALS
Qty: 24 TABLET | Refills: 0 | Status: SHIPPED | OUTPATIENT
Start: 2021-03-09 | End: 2021-12-03

## 2021-03-09 RX ORDER — NAPROXEN 500 MG/1
500 TABLET ORAL 2 TIMES DAILY WITH MEALS
Qty: 24 TABLET | Refills: 0 | Status: SHIPPED | OUTPATIENT
Start: 2021-03-09 | End: 2021-03-09

## 2021-03-09 RX ADMIN — IBUPROFEN 800 MG: 200 TABLET, FILM COATED ORAL at 14:30

## 2021-03-09 NOTE — ED PROVIDER NOTES
History     Chief Complaint   Patient presents with     Foot Pain     L foot pain for the last week or so. Pt having pain along the bottom and lateral portion of the foot     HPI  Germain Iraheta is a 35 year old male with past medical history of perforated right tympanic membrane and hearing loss, sleep disruption, chronic tobacco use who presents to urgent care with left foot pain with reported onset of symptoms for the past week or so.    Patient denies any trauma.  Patient states aggravating symptoms is going barefoot around the house.  Patient states wearing shoes is helpful.  Patient has denied any other treatments or therapies attempted.  Patient states the pain is severe and constant.  Patient admits that his right foot used to bother him similarly but does not presently.    Allergies:  Allergies   Allergen Reactions     Amoxicillin Rash     Ceclor [Cefaclor Monohydrate] Rash     Erythromycin Rash     Lorabid [Loracarbef] Rash     Penicillins Rash       Problem List:    Patient Active Problem List    Diagnosis Date Noted     Perforation of right tympanic membrane 11/20/2020     Priority: Medium     Added automatically from request for surgery [id number]       Conductive hearing loss of right ear with unrestricted hearing of left ear 11/20/2020     Priority: Medium     Added automatically from request for surgery [id number]       Impotence of organic origin 06/05/2013     Priority: Medium     CARDIOVASCULAR SCREENING; LDL GOAL LESS THAN 160 06/04/2013     Priority: Medium        Past Medical History:    Past Medical History:   Diagnosis Date     ED (erectile dysfunction)      Insomnia      RLS (restless legs syndrome)      Tobacco use disorder        Past Surgical History:    Past Surgical History:   Procedure Laterality Date     PE TUBES       TYMPANOPLASTY Right 1/18/2021    Procedure: RIGHT TYMPANOPLASTY WITH CARTILAGE BACKING;  Surgeon: Sandra Peña MD;  Location: [facility] OR       Family History:    Family  History   Problem Relation Age of Onset     Diabetes Father      Cancer Maternal Grandfather         lung     Hypertension No family hx of        Social History:  Marital Status:  Single [1]  Social History     Tobacco Use     Smoking status: Current Every Day Smoker     Packs/day: 0.50     Years: 3.00     Pack years: 1.50     Types: Cigarettes     Smokeless tobacco: Never Used   Substance Use Topics     Alcohol use: Yes     Alcohol/week: 0.0 standard drinks     Comment: 5 drinks a month- weekend use     Drug use: Not Currently     Comment: past use        Medications:    naproxen (NAPROSYN) 500 MG tablet  gabapentin (NEURONTIN) 600 MG tablet  ibuprofen (ADVIL/MOTRIN) 200 MG tablet  tadalafil (CIALIS) 20 MG tablet  traZODone (DESYREL) 100 MG tablet      Review of Systems  As mentioned above in the history present illness. All other systems were reviewed and are negative.    Physical Exam   BP: (!) 192/92  Pulse: 70  Temp: 97.8  F (36.6  C)  Resp: 18  Weight: 90.3 kg (199 lb)  SpO2: 97 %      Physical Exam  Vitals signs and nursing note reviewed.   Constitutional:       General: He is not in acute distress.     Appearance: He is well-developed. He is not diaphoretic.   HENT:      Head: Normocephalic and atraumatic.      Right Ear: External ear normal.      Left Ear: External ear normal.      Nose: Nose normal.   Eyes:      General:         Right eye: No discharge.         Left eye: No discharge.      Conjunctiva/sclera: Conjunctivae normal.   Cardiovascular:      Rate and Rhythm: Normal rate and regular rhythm.      Heart sounds: Normal heart sounds. No murmur. No friction rub. No gallop.    Pulmonary:      Effort: Pulmonary effort is normal.      Breath sounds: Normal breath sounds. No stridor. No wheezing.   Abdominal:      Tenderness: There is no abdominal tenderness.   Musculoskeletal:      Right foot: Normal.      Left foot: Normal range of motion and normal capillary refill. Tenderness (generalized mid foot  dorsal and sole tenderness) and bony tenderness (over the mid to distal metatarsals 2-4) present. No swelling, crepitus, deformity or laceration.   Skin:     General: Skin is warm.      Findings: No rash.   Neurological:      Mental Status: He is alert and oriented to person, place, and time.         ED Course        Procedures      Results for orders placed or performed during the hospital encounter of 03/09/21 (from the past 24 hour(s))   Foot XR, G/E 3 views, left    Narrative    XR FOOT LT G/E 3 VW 3/9/2021 2:25 PM     HISTORY: metarsal 2-4 pain dorsal of left foot for one week      Impression    IMPRESSION: No evidence of metatarsal fracture or periosteal reaction.  Joint space widths are within normal limits. Calcaneal spurs are  noted.    KRISTEN MCMILLAN MD       Medications   ibuprofen (ADVIL/MOTRIN) tablet 800 mg (800 mg Oral Given 3/9/21 1430)       Assessments & Plan (with Medical Decision Making)     I have reviewed the nursing notes.    I have reviewed the findings, diagnosis, plan and need for follow up with the patient.  35-year-old male presenting to urgent care with 1 week history of left foot pain without trauma.  On exam there is no erythema, swelling, or signs of infection.  Patient has mid to distal dorsal and sole foot pain without crepitus or step-offs.  X-ray ordered to evaluate for fracture.  No fracture noted.  Will treat as foot pain, metatarsalgia, low risk for plantar fasciitis or gout or infectious etiology.  Will refer to podiatry for ongoing care.  Will recommend good soled shoes and Aleve and Tylenol as needed..    New Prescriptions    NAPROXEN (NAPROSYN) 500 MG TABLET    Take 1 tablet (500 mg) by mouth 2 times daily (with meals)       Final diagnoses:   Left foot pain       3/9/2021   [facility] EMERGENCY DEPT     [place], Brittany Ayala, KRISTINA CNP  03/09/21 [id number]

## 2021-03-09 NOTE — DISCHARGE INSTRUCTIONS
I recommend naproxen 1 tablet by mouth once or twice daily as needed for pain.  I recommend wearing proper shoes.  I recommend follow-up with podiatry for evaluation and care of your feet pain.

## 2021-03-16 ENCOUNTER — OFFICE VISIT (OUTPATIENT)
Dept: FAMILY MEDICINE | Facility: CLINIC | Age: 36
End: 2021-03-16
Payer: COMMERCIAL

## 2021-03-16 VITALS
BODY MASS INDEX: 29.18 KG/M2 | RESPIRATION RATE: 14 BRPM | HEART RATE: 72 BPM | DIASTOLIC BLOOD PRESSURE: 100 MMHG | WEIGHT: 209.2 LBS | TEMPERATURE: 96.8 F | SYSTOLIC BLOOD PRESSURE: 138 MMHG | OXYGEN SATURATION: 98 %

## 2021-03-16 DIAGNOSIS — I10 ESSENTIAL HYPERTENSION: Primary | ICD-10-CM

## 2021-03-16 DIAGNOSIS — N52.9 IMPOTENCE OF ORGANIC ORIGIN: ICD-10-CM

## 2021-03-16 PROCEDURE — 99213 OFFICE O/P EST LOW 20 MIN: CPT | Performed by: FAMILY MEDICINE

## 2021-03-16 RX ORDER — CHLORTHALIDONE 25 MG/1
25 TABLET ORAL DAILY
Qty: 30 TABLET | Refills: 0 | Status: SHIPPED | OUTPATIENT
Start: 2021-03-16

## 2021-03-16 NOTE — PATIENT INSTRUCTIONS
Patient Education     Taking a Diuretic  Your healthcare provider has prescribed a diuretic, or  water pill,  to help your body get rid of extra water and salt and maintain fluid balance. Taking your diuretic can help you feel better, breathe better, move more easily, and have more energy.     Take your medication early in the day at the same time each day.    The name of my diuretic is:   ________________________________________  Medicine tips    Read the fact sheet that comes with your medicine. It tells you when and how to take it. Ask for a medicine sheet if you don t get one.    If you take 2 or more doses each day, take the last one before dinner if you can. That way you ll get up fewer times during the night to go to the bathroom. However, make sure you have enough time between doses during the day.     If you miss a dose, take it as soon as you remember. If it's almost time for the next dose, skip the missed dose. Don't take a double dose.    If you miss 2 or more consecutive doses, call your healthcare provider. You may be at risk for fluid buildup.    For your safety    Follow your healthcare provider s guidelines for potassium intake. You may need to take a potassium supplement. Or, you may need to avoid potassium supplements, salt substitutes with potassium, or large amounts of high-potassium foods (such as bananas, potatoes, broccoli, and milk). Recommendations for potassium will depend on the type of diuretic you are prescribed along with your kidney function and other factors. Your healthcare provider will likely want to check your potassium level regularly while you are taking this medicine.    Talk to your healthcare provider about whether it's safe for you to drink alcohol while taking this medicine.    Get up slowly when you are sitting or lying down. This helps prevent dizziness and falls due to dizziness.    Ask your healthcare provider or pharmacist before you take any other prescription or  nonprescription medicine or herbal supplements. Some of them may interact with your diuretic and keep it from working correctly.    Limit exposure to sunlight. A diuretic may increase your sensitivity to the sun. Even brief sun exposure may cause skin rash, itching, redness, or other discoloration. It may also lead to severe sunburn. To protect your skin do the following:  ? Stay out of direct sunlight, especially between 10 a.m. and 2 p.m., whenever possible. The amount of direct sunlight exposure you are have can vary depending on where you live.  ? Apply a daily sunblock of at least SPF 15 (or higher) to any exposed skin, including your lips.  ? Wear protective clothing, such as a hat and sunglasses, when you are outdoors.  ? Don't use sunlamps and tanning booths.  ? Long-sleeve shirts and pants in the summer can help protect your skin.    When to seek medical advice  Call your healthcare provider right away if any of these occur:    Have diarrhea, constipation, nausea or vomiting    Lose your appetite or notice a rapid or excessive weight gain    Feel extremely tired or weak    Have shortness of breath or trouble breathing or swallowing    Have numbness or tingling in your hands, feet, or lips or a ringing in your ears    Feel lightheaded when getting up after sitting or lying down    Have headaches, blurred vision, or feel a sense of confusion    Have muscle cramps or joint pain    Have chest pains or changes in your heartbeat    Have an excessive thirst or a dry mouth    Notice a skin rash    Gain more than 2 pounds (0.9 kg) in 1 day or 4 pounds (1.8 kg) in 1 week. Ask your healthcare provider for his or her specific direction.    Have increased swelling in your legs, feet, or belly (abdomen)    Have rapid or irregular heartbeats    Have any other unusual symptoms  [employer] last reviewed this educational content on 7/1/2019 2000-2020 [employer]. All rights reserved. This information is not  intended as a substitute for professional medical care. Always follow your healthcare professional's instructions.           Patient Education     Discharge Instructions for High Blood Pressure (Hypertension)  You have been diagnosed with high blood pressure (hypertension). This means the force of blood against your artery walls is too strong. It also means your heart is working hard to move blood. High blood pressure usually has no symptoms, but over time, it can cause serious health problems. High blood pressure raises your risk for heart attack, stroke, heart disease, heart failure, kidney disease, and vision loss. With help from your doctor, you can manage your blood pressure and protect your health.  Blood pressure measurements are given as 2 numbers. Systolic blood pressure is the upper number. This is the pressure when the heart contracts or pumps. Diastolic blood pressure is the lower number. This is the pressure when the heart relaxes between beats.  Blood pressure is categorized as normal, elevated, or stage 1 or stage 2 high blood pressure:    Normal blood pressure is systolic of less than 120 and diastolic of less than 80 (120/80) at rest    Elevated blood pressure is systolic of 120 to 129 and diastolic less than 80 at rest    Stage 1 high blood pressure is systolic is 130 to 139 or diastolic between 80 to 89 at rest    Stage 2 high blood pressure is when systolic is 140 or higher or the diastolic is 90 or higher at rest  Taking medicine    Learn to measure your own blood pressure. Keep a record of your results. Ask your doctor which readings mean that you need medical attention.    Take your blood pressure medicine exactly as directed. Don t skip doses. Missing doses can cause your blood pressure to get out of control.    If you do miss a dose (or doses) check with your healthcare provider about what to do.    Don't take medicines that contain heart stimulants, including over-the-counter medicines.  Check for warnings about high blood pressure on the label. Ask the pharmacist before purchasing something you haven't used before    Check with your doctor or pharmacist before taking a decongestant such as pseudoephedrine or phenylephrine. Some decongestants can worsen high blood pressure.    Lifestyle changes    Stay at a healthy weight. Get help to lose any extra pounds (kilograms). Often times meeting with a dietitian can help you identify changes that can be made to your diet to help with weight loss.    Cut back on salt.  ? Limit canned, dried, packaged, and fast foods.  ? Don t add salt to your food at the table.  ? Season foods with herbs instead of salt when you cook.  ? Request no added salt when you go to a restaurant.  ? The American Heart Association (AHA) says the ideal amount of sodium is no more than 1,500 mg a day.  But because Americans eat so much salt, you can make a positive change by cutting back to even 2,300 mg of sodium a day (1 teaspoon).     Follow the DASH (Dietary Approaches to Stop Hypertension) eating plan. This plan recommends vegetables, fruits, whole gains, and other heart healthy foods.    Eat food rich in potassium.    Begin an exercise program. Ask your healthcare provider how to get started. The AHA recommends aerobic exercise 3 to 4 times a week for an average of 40 minutes at a time to lower blood pressure, with your provider's approval. Simple activities such as walking or gardening can help.    Break the smoking habit. Enroll in a stop-smoking program to improve your chances of success. Ask your healthcare provider about programs and medicines to help you stop smoking.    Limit drinks that contain caffeine such as coffee, black or green tea, and cola to 2 per day.    Never take stimulants such as amphetamines or cocaine. These drugs can be deadly for someone with high blood pressure.    Control your stress. Learn ways to manage stress.    Limit alcohol to no more than 1  drink a day for women and 2 drinks a day for men.    Follow-up care  Make a follow-up appointment as directed.  When to call your healthcare provider  Call your healthcare provider right away if you have any of the following:    Chest pain or shortness of breath ( call 911)    Moderate to severe headache    Weakness in the muscles of your face, arms, or legs    Trouble speaking    Extreme drowsiness    Confusion    Fainting or dizziness    Pulsating or rushing sound in your ears    Unexplained nosebleed    Weakness, tingling, or numbness of your face, arms, or legs    Change in vision    Blood pressure measured at home that is greater than 180/110  [employer] last reviewed this educational content on 8/1/2019 2000-2020 [employer]. All rights reserved. This information is not intended as a substitute for professional medical care. Always follow your healthcare professional's instructions.

## 2021-03-16 NOTE — PROGRESS NOTES
Assessment & Plan     Essential hypertension  Patient started on medication. Recommend a recheck of blood pressure in a couple of weeks.   - chlorthalidone (HYGROTON) 25 MG tablet; Take 1 tablet (25 mg) by mouth daily  - Basic metabolic panel FUTURE anytime; Future        FUTURE APPOINTMENTS:       - Follow-up visit in 1-2 weeks.    Return in about 3 weeks (around 4/6/2021) for Follow up.    Nick Santoyo MD  [facility] JOSELO Paulson is a 35 year old who presents for the following health issues     HPI   35 yr old male here for recheck of BP. He  was seen in the ED a few days ago for foot pain and his blood pressure was very high. He was asked to f/u in clinic. He reports that he has never been told that he has high blood pressure.I reviewed his vitals and it appears that his blood pressure has been high for while. He reports no family history of hypertension.    Denies any chest pain or shortness of breath. No leg swelling.     ED/ Followup:    Facility:  [facility]  Date of visit: 3/9/21  Reason for visit: Foot pain  Current Status: was told to follow up because of elevated BP           Review of Systems   Constitutional, HEENT, cardiovascular, pulmonary, gi and gu systems are negative, except as otherwise noted.      Objective    BP (!) 138/100   Pulse 72   Temp 96.8  F (36  C) (Tympanic)   Resp 14   Wt 94.9 kg (209 lb 3.2 oz)   SpO2 98%   BMI 29.18 kg/m    Body mass index is 29.18 kg/m .  Physical Exam   GENERAL: healthy, alert and no distress  EYES: Eyes grossly normal to inspection, PERRL and conjunctivae and sclerae normal  HENT: ear canals and TM's normal, nose and mouth without ulcers or lesions  NECK: no adenopathy, no asymmetry, masses, or scars and thyroid normal to palpation  RESP: lungs clear to auscultation - no rales, rhonchi or wheezes  CV: regular rate and rhythm, normal S1 S2, no S3 or S4, no murmur, click or rub, no peripheral edema and peripheral  pulses strong  ABDOMEN: soft, nontender, no hepatosplenomegaly, no masses and bowel sounds normal  MS: no gross musculoskeletal defects noted, no edema

## 2021-03-17 ENCOUNTER — OFFICE VISIT (OUTPATIENT)
Dept: AUDIOLOGY | Facility: CLINIC | Age: 36
End: 2021-03-17
Attending: OTOLARYNGOLOGY
Payer: COMMERCIAL

## 2021-03-17 ENCOUNTER — OFFICE VISIT (OUTPATIENT)
Dept: OTOLARYNGOLOGY | Facility: CLINIC | Age: 36
End: 2021-03-17
Payer: COMMERCIAL

## 2021-03-17 VITALS
HEART RATE: 106 BPM | BODY MASS INDEX: 28.09 KG/M2 | TEMPERATURE: 98.1 F | WEIGHT: 200.62 LBS | DIASTOLIC BLOOD PRESSURE: 99 MMHG | OXYGEN SATURATION: 98 % | SYSTOLIC BLOOD PRESSURE: 137 MMHG | HEIGHT: 71 IN | RESPIRATION RATE: 15 BRPM

## 2021-03-17 DIAGNOSIS — H90.11 CONDUCTIVE HEARING LOSS OF RIGHT EAR WITH UNRESTRICTED HEARING OF LEFT EAR: Primary | ICD-10-CM

## 2021-03-17 DIAGNOSIS — H69.91 DYSFUNCTION OF RIGHT EUSTACHIAN TUBE: ICD-10-CM

## 2021-03-17 PROCEDURE — 92550 TYMPANOMETRY & REFLEX THRESH: CPT | Mod: 52 | Performed by: AUDIOLOGIST

## 2021-03-17 PROCEDURE — 92557 COMPREHENSIVE HEARING TEST: CPT | Performed by: AUDIOLOGIST

## 2021-03-17 PROCEDURE — 99024 POSTOP FOLLOW-UP VISIT: CPT | Performed by: OTOLARYNGOLOGY

## 2021-03-17 RX ORDER — TADALAFIL 20 MG/1
TABLET ORAL
Qty: 6 TABLET | Refills: 3 | Status: SHIPPED | OUTPATIENT
Start: 2021-03-17 | End: 2021-05-11

## 2021-03-17 ASSESSMENT — PAIN SCALES - GENERAL: PAINLEVEL: NO PAIN (0)

## 2021-03-17 ASSESSMENT — MIFFLIN-ST. JEOR: SCORE: 1857.13

## 2021-03-17 NOTE — TELEPHONE ENCOUNTER
"Requested Prescriptions   Pending Prescriptions Disp Refills     tadalafil (CIALIS) 20 MG tablet [Pharmacy Med Name: TADALAFIL 20MG TABS] 6 tablet 3     Sig: TAKE ONE TABLET BY MOUTH ONCE DAILY AS NEEDED FOR ERECTILE DYSFUNCTION       Erectile Dysfuction Protocol Passed - 3/16/2021  6:27 PM        Passed - Absence of nitrates on medication list        Passed - Absence of Alpha Blockers on Med list        Passed - Recent (12 mo) or future (30 days) visit within the authorizing provider's specialty     Patient has had an office visit with the authorizing provider or a provider within the authorizing providers department within the previous 12 mos or has a future within next 30 days. See \"Patient Info\" tab in inbasket, or \"Choose Columns\" in Meds & Orders section of the refill encounter.              Passed - Medication is active on med list        Passed - Patient is age 18 or older             "

## 2021-03-17 NOTE — NURSING NOTE
"Chief Complaint   Patient presents with     RECHECK     follow  up      Blood pressure (!) 149/112, pulse 111, temperature 98.1  F (36.7  C), resp. rate 15, height 1.803 m (5' 11\"), weight 91 kg (200 lb 9.9 oz), SpO2 98 %.    Yared Rodriguez LPN    "

## 2021-03-17 NOTE — LETTER
3/17/2021      RE: Germain Iraheta  [address] [facility] [address]       Germain Iraheta is seen for his second postoperative visit after right cartilage tympanoplasty where the end of the eroded incus had epithelium on it which was removed. He reports his hearing seems improved and there's been no pain or drainage.    Physical examination:  male in no acute distress.  Alert and answering questions appropriately.  HB 1/6 bilaterally.  Right ear examined under the microscope. Ear canal clear, cartilage backing in position and healing with neovascularization, anterior TM intact, middle ear appears aerated.    Audiogram:  Audiogram was independently reviewed and compared to his postoperative audiogram. Normal left hearing. Right mild conductive hearing loss which is an improvement from the moderate loss that was present previously.    Assessment and plan:  Doing well with improved hearing. Currently with no evidence of eustachian tube dysfunction but he understands that we will need to check him over the next year or two as he may end up needing a t-tube. We had originally planned on a second look but given that the epithelium appeared to be completely removed from the end of the incus, we can observe for now. We'll see him again in 6 months.        Sandra Peña MD

## 2021-03-17 NOTE — PATIENT INSTRUCTIONS
1. You were seen in the ENT Clinic today by Dr. Peña.  If you have any questions or concerns after your appointment, please call   - Option 1: ENT Clinic: [phone]  - Option 2: [facility] (Dr. Peña): [phone]    2.   Plan to return to clinic in 6 months without a new hearing test.     Rafia Martin RN  Clinical Coordinator  [facility]  [phone]

## 2021-03-17 NOTE — PROGRESS NOTES
AUDIOLOGY REPORT    SUMMARY: Audiology visit completed. See audiogram for results.      RECOMMENDATIONS: Follow-up with ENT.    Concepcion Valerio. CCC-A  Licensed Audiologist   [place] #[id number]

## 2021-03-18 NOTE — PROGRESS NOTES
Germain Iraheta is seen for his second postoperative visit after right cartilage tympanoplasty where the end of the eroded incus had epithelium on it which was removed. He reports his hearing seems improved and there's been no pain or drainage.    Physical examination:  male in no acute distress.  Alert and answering questions appropriately.  HB 1/6 bilaterally.  Right ear examined under the microscope. Ear canal clear, cartilage backing in position and healing with neovascularization, anterior TM intact, middle ear appears aerated.    Audiogram:  Audiogram was independently reviewed and compared to his postoperative audiogram. Normal left hearing. Right mild conductive hearing loss which is an improvement from the moderate loss that was present previously.    Assessment and plan:  Doing well with improved hearing. Currently with no evidence of eustachian tube dysfunction but he understands that we will need to check him over the next year or two as he may end up needing a t-tube. We had originally planned on a second look but given that the epithelium appeared to be completely removed from the end of the incus, we can observe for now. We'll see him again in 6 months.

## 2021-05-10 DIAGNOSIS — N52.9 IMPOTENCE OF ORGANIC ORIGIN: ICD-10-CM

## 2021-05-11 RX ORDER — TADALAFIL 20 MG/1
TABLET ORAL
Qty: 6 TABLET | Refills: 3 | Status: SHIPPED | OUTPATIENT
Start: 2021-05-11 | End: 2021-06-23

## 2021-05-11 NOTE — TELEPHONE ENCOUNTER
"Prescription approved per [facility] Refill Protocol.    Requested Prescriptions   Pending Prescriptions Disp Refills     tadalafil (CIALIS) 20 MG tablet [Pharmacy Med Name: TADALAFIL 20MG TABS] 6 tablet 3     Sig: TAKE ONE TABLET BY MOUTH ONCE DAILY AS NEEDED FOR ERECTILE DYSFUNCTION       Erectile Dysfuction Protocol Passed - 5/10/2021  4:38 PM        Passed - Absence of nitrates on medication list        Passed - Absence of Alpha Blockers on Med list        Passed - Recent (12 mo) or future (30 days) visit within the authorizing provider's specialty     Patient has had an office visit with the authorizing provider or a provider within the authorizing providers department within the previous 12 mos or has a future within next 30 days. See \"Patient Info\" tab in inbasket, or \"Choose Columns\" in Meds & Orders section of the refill encounter.              Passed - Medication is active on med list        Passed - Patient is age 18 or older           Malathi ADAMS RN, BSN      "

## 2021-05-26 ENCOUNTER — HOSPITAL ENCOUNTER (EMERGENCY)
Facility: CLINIC | Age: 36
Discharge: HOME | End: 2021-05-26
Attending: EMERGENCY MEDICINE | Admitting: EMERGENCY MEDICINE
Payer: COMMERCIAL

## 2021-05-26 ENCOUNTER — APPOINTMENT (OUTPATIENT)
Dept: GENERAL RADIOLOGY | Facility: CLINIC | Age: 36
End: 2021-05-26
Attending: EMERGENCY MEDICINE
Payer: COMMERCIAL

## 2021-05-26 VITALS
TEMPERATURE: 100 F | OXYGEN SATURATION: 96 % | HEART RATE: 92 BPM | DIASTOLIC BLOOD PRESSURE: 89 MMHG | SYSTOLIC BLOOD PRESSURE: 132 MMHG

## 2021-05-26 DIAGNOSIS — R07.89 CHEST DISCOMFORT: ICD-10-CM

## 2021-05-26 DIAGNOSIS — R06.02 SHORTNESS OF BREATH: ICD-10-CM

## 2021-05-26 LAB
ALBUMIN SERPL-MCNC: 4 G/DL (ref 3.4–5)
ALP SERPL-CCNC: 71 U/L (ref 40–150)
ALT SERPL W P-5'-P-CCNC: 29 U/L (ref 0–70)
ANION GAP SERPL CALCULATED.3IONS-SCNC: 7 MMOL/L (ref 3–14)
AST SERPL W P-5'-P-CCNC: 23 U/L (ref 0–45)
BASOPHILS # BLD AUTO: 0 10E9/L (ref 0–0.2)
BASOPHILS NFR BLD AUTO: 0.6 %
BILIRUB SERPL-MCNC: 0.3 MG/DL (ref 0.2–1.3)
BUN SERPL-MCNC: 15 MG/DL (ref 7–30)
CALCIUM SERPL-MCNC: 8.5 MG/DL (ref 8.5–10.1)
CHLORIDE SERPL-SCNC: 104 MMOL/L (ref 94–109)
CO2 SERPL-SCNC: 25 MMOL/L (ref 20–32)
CREAT SERPL-MCNC: 0.97 MG/DL (ref 0.66–1.25)
DIFFERENTIAL METHOD BLD: NORMAL
EOSINOPHIL # BLD AUTO: 0.1 10E9/L (ref 0–0.7)
EOSINOPHIL NFR BLD AUTO: 2.1 %
ERYTHROCYTE [DISTWIDTH] IN BLOOD BY AUTOMATED COUNT: 12.2 % (ref 10–15)
GFR SERPL CREATININE-BSD FRML MDRD: >90 ML/MIN/{1.73_M2}
GLUCOSE SERPL-MCNC: 101 MG/DL (ref 70–99)
HCT VFR BLD AUTO: 45.3 % (ref 40–53)
HGB BLD-MCNC: 15.4 G/DL (ref 13.3–17.7)
IMM GRANULOCYTES # BLD: 0 10E9/L (ref 0–0.4)
IMM GRANULOCYTES NFR BLD: 0.2 %
LYMPHOCYTES # BLD AUTO: 1.3 10E9/L (ref 0.8–5.3)
LYMPHOCYTES NFR BLD AUTO: 25 %
MCH RBC QN AUTO: 30 PG (ref 26.5–33)
MCHC RBC AUTO-ENTMCNC: 34 G/DL (ref 31.5–36.5)
MCV RBC AUTO: 88 FL (ref 78–100)
MONOCYTES # BLD AUTO: 0.6 10E9/L (ref 0–1.3)
MONOCYTES NFR BLD AUTO: 11.5 %
NEUTROPHILS # BLD AUTO: 3.2 10E9/L (ref 1.6–8.3)
NEUTROPHILS NFR BLD AUTO: 60.6 %
NRBC # BLD AUTO: 0 10*3/UL
NRBC BLD AUTO-RTO: 0 /100
PLATELET # BLD AUTO: 208 10E9/L (ref 150–450)
POTASSIUM SERPL-SCNC: 3.9 MMOL/L (ref 3.4–5.3)
PROT SERPL-MCNC: 7.4 G/DL (ref 6.8–8.8)
RBC # BLD AUTO: 5.14 10E12/L (ref 4.4–5.9)
SODIUM SERPL-SCNC: 136 MMOL/L (ref 133–144)
TROPONIN I SERPL-MCNC: <0.015 UG/L (ref 0–0.04)
WBC # BLD AUTO: 5.2 10E9/L (ref 4–11)

## 2021-05-26 PROCEDURE — 99285 EMERGENCY DEPT VISIT HI MDM: CPT | Mod: 25 | Performed by: EMERGENCY MEDICINE

## 2021-05-26 PROCEDURE — 96361 HYDRATE IV INFUSION ADD-ON: CPT | Performed by: EMERGENCY MEDICINE

## 2021-05-26 PROCEDURE — 93010 ELECTROCARDIOGRAM REPORT: CPT | Performed by: EMERGENCY MEDICINE

## 2021-05-26 PROCEDURE — 85025 COMPLETE CBC W/AUTO DIFF WBC: CPT | Performed by: EMERGENCY MEDICINE

## 2021-05-26 PROCEDURE — 258N000003 HC RX IP 258 OP 636: Performed by: EMERGENCY MEDICINE

## 2021-05-26 PROCEDURE — 96360 HYDRATION IV INFUSION INIT: CPT | Performed by: EMERGENCY MEDICINE

## 2021-05-26 PROCEDURE — 250N000013 HC RX MED GY IP 250 OP 250 PS 637: Performed by: EMERGENCY MEDICINE

## 2021-05-26 PROCEDURE — 250N000009 HC RX 250: Performed by: EMERGENCY MEDICINE

## 2021-05-26 PROCEDURE — 71046 X-RAY EXAM CHEST 2 VIEWS: CPT

## 2021-05-26 PROCEDURE — 80053 COMPREHEN METABOLIC PANEL: CPT | Performed by: EMERGENCY MEDICINE

## 2021-05-26 PROCEDURE — 84484 ASSAY OF TROPONIN QUANT: CPT | Performed by: EMERGENCY MEDICINE

## 2021-05-26 PROCEDURE — 93005 ELECTROCARDIOGRAM TRACING: CPT | Performed by: EMERGENCY MEDICINE

## 2021-05-26 RX ORDER — SODIUM CHLORIDE 9 MG/ML
1000 INJECTION, SOLUTION INTRAVENOUS CONTINUOUS
Status: DISCONTINUED | OUTPATIENT
Start: 2021-05-26 | End: 2021-05-26 | Stop reason: HOSPADM

## 2021-05-26 RX ORDER — NITROGLYCERIN 0.4 MG/1
0.4 TABLET SUBLINGUAL
Status: COMPLETED | OUTPATIENT
Start: 2021-05-26 | End: 2021-05-26

## 2021-05-26 RX ADMIN — SODIUM CHLORIDE 500 ML: 9 INJECTION, SOLUTION INTRAVENOUS at 01:10

## 2021-05-26 RX ADMIN — LIDOCAINE HYDROCHLORIDE 30 ML: 20 SOLUTION ORAL; TOPICAL at 01:10

## 2021-05-26 RX ADMIN — NITROGLYCERIN 0.4 MG: 0.4 TABLET SUBLINGUAL at 01:10

## 2021-05-26 ASSESSMENT — ENCOUNTER SYMPTOMS
HEMATOLOGIC/LYMPHATIC NEGATIVE: 1
MUSCULOSKELETAL NEGATIVE: 1
EYES NEGATIVE: 1
CONSTITUTIONAL NEGATIVE: 1
ENDOCRINE NEGATIVE: 1
ALLERGIC/IMMUNOLOGIC NEGATIVE: 1
NEUROLOGICAL NEGATIVE: 1
SHORTNESS OF BREATH: 1
PSYCHIATRIC NEGATIVE: 1
GASTROINTESTINAL NEGATIVE: 1

## 2021-05-26 NOTE — DISCHARGE INSTRUCTIONS
1) Your patient tonight does not suggest an emergency diagnosis.  Your work-up was reassuring showing no evidence of heart attack, pneumonia, or collapsed lung.    2) For heartburn we discussed trial of over-the-counter remedies including Maalox.  If persistent heartburn despite trying over-the-counter remedies he may need to have an upper endoscopy to scoot any inflammation in the esophagus or stomach including an ulcer.  You will need to call the general surgery clinic to schedule follow-up upper endoscopy if his symptoms persist for the next 1 month.    3) You appear stable for discharge home at this time however if you develop new symptoms of concern if your symptoms worsen you should return to the emergency department to be reevaluated.

## 2021-05-26 NOTE — ED PROVIDER NOTES
History     Chief Complaint   Patient presents with     Chest Pain     HPI  Germain Iraheta is a 35 year old male who presents for evaluation with report of substernal chest discomfort described as heartburn, exertional fatigue and shortness of breath while chasing his one and half-year-old child around earlier this evening.  Patient reports significant stressors including a break-up with his partner.  Patient reports he smokes almost a pack per day.  He was recently placed on hydrochlorothiazide for hypertension.  Patient reports he has not been compliant with his hydrochlorothiazide.  Patient reports he is also on trazodone.  Patient reports he took Pepcid without relief in his substernal chest discomfort.  Patient is currently laid off.  He is a . Medical records indicate a history of conductive hearing loss. With persistent heartburn and feeling short of breath with activity without significant exertion he presents for further care and evaluation.    Allergies:  Allergies   Allergen Reactions     Amoxicillin Rash     Ceclor [Cefaclor Monohydrate] Rash     Erythromycin Rash     Lorabid [Loracarbef] Rash     Penicillins Rash       Problem List:    Patient Active Problem List    Diagnosis Date Noted     Perforation of right tympanic membrane 11/20/2020     Priority: Medium     Added automatically from request for surgery [id number]       Conductive hearing loss of right ear with unrestricted hearing of left ear 11/20/2020     Priority: Medium     Added automatically from request for surgery [id number]       Impotence of organic origin 06/05/2013     Priority: Medium     CARDIOVASCULAR SCREENING; LDL GOAL LESS THAN 160 06/04/2013     Priority: Medium        Past Medical History:    Past Medical History:   Diagnosis Date     ED (erectile dysfunction)      Insomnia      RLS (restless legs syndrome)      Tobacco use disorder        Past Surgical History:    Past Surgical History:   Procedure Laterality Date      PE TUBES       TYMPANOPLASTY Right 1/18/2021    Procedure: RIGHT TYMPANOPLASTY WITH CARTILAGE BACKING;  Surgeon: Sandra Peña MD;  Location: [facility] OR       Family History:    Family History   Problem Relation Age of Onset     Diabetes Father      Cancer Maternal Grandfather         lung     Hypertension No family hx of        Social History:  Marital Status:  Single [1]  Social History     Tobacco Use     Smoking status: Current Every Day Smoker     Packs/day: 0.50     Years: 3.00     Pack years: 1.50     Types: Cigarettes     Smokeless tobacco: Never Used   Substance Use Topics     Alcohol use: Yes     Alcohol/week: 0.0 standard drinks     Comment: 5 drinks a month- weekend use     Drug use: Not Currently     Comment: past use        Medications:    chlorthalidone (HYGROTON) 25 MG tablet  gabapentin (NEURONTIN) 600 MG tablet  ibuprofen (ADVIL/MOTRIN) 200 MG tablet  naproxen (NAPROSYN) 500 MG tablet  tadalafil (CIALIS) 20 MG tablet  traZODone (DESYREL) 100 MG tablet          Review of Systems   Constitutional: Negative.    HENT: Negative.    Eyes: Negative.    Respiratory: Positive for shortness of breath.    Cardiovascular: Positive for chest pain.   Gastrointestinal: Negative.    Endocrine: Negative.    Genitourinary: Negative.    Musculoskeletal: Negative.    Skin: Negative.    Allergic/Immunologic: Negative.    Neurological: Negative.    Hematological: Negative.    Psychiatric/Behavioral: Negative.    All other systems reviewed and are negative.      Physical Exam   BP: (!) 144/90  Pulse: 89  Temp: 100  F (37.8  C)  SpO2: 96 %      Physical Exam  Constitutional:       General: He is not in acute distress.     Appearance: He is normal weight. He is not ill-appearing, toxic-appearing or diaphoretic.   HENT:      Head: Normocephalic and atraumatic.   Eyes:      Extraocular Movements: Extraocular movements intact.      Pupils: Pupils are equal, round, and reactive to light.   Neck:      Musculoskeletal: Normal  range of motion and neck supple.      Thyroid: No thyromegaly.      Vascular: No hepatojugular reflux or JVD.   Cardiovascular:      Rate and Rhythm: Normal rate and regular rhythm.      Heart sounds: Normal heart sounds.   Pulmonary:      Effort: Pulmonary effort is normal. No tachypnea, accessory muscle usage or respiratory distress.      Breath sounds: Normal breath sounds. No stridor.   Chest:      Chest wall: No mass, deformity, tenderness, crepitus or edema. There is no dullness to percussion.   Abdominal:      Palpations: Abdomen is soft.   Musculoskeletal:      Right lower leg: He exhibits no tenderness. No edema.      Left lower leg: He exhibits no tenderness. No edema.   Lymphadenopathy:      Cervical: No cervical adenopathy.   Skin:     Capillary Refill: Capillary refill takes less than 2 seconds.      Coloration: Skin is not cyanotic or pale.      Findings: No ecchymosis, erythema or rash.      Nails: There is no clubbing.     Neurological:      General: No focal deficit present.      Mental Status: He is alert and oriented to person, place, and time.   Psychiatric:         Mood and Affect: Mood normal. Mood is not anxious.         Behavior: Behavior normal. Behavior is not agitated.         ED Course        Procedures               EKG Interpretation:      Interpreted by Jabier Mendiola MD  Time reviewed: 0110  Symptoms at time of EKG: Chest discomfort described as heartburn.  Rhythm: normal sinus   Rate: Normal  Axis: Normal  Ectopy: none  Conduction: normal  ST Segments/ T Waves: Non-specific ST-T wave changes  Q Waves: nonspecific  Comparison to prior: No old EKG available    Clinical Impression: No acute ischemia or arrhythmia appreciated.      Critical Care time:  none               ED medications:  Medications   sodium chloride 0.9% infusion (has no administration in time range)   lidocaine (XYLOCAINE) 2 % 15 mL, alum & mag hydroxide-simethicone (MAALOX) 15 mL GI Cocktail (30 mLs Oral Given  5/26/21 0110)   nitroGLYcerin (NITROSTAT) sublingual tablet 0.4 mg (0.4 mg Sublingual Given 5/26/21 0110)   0.9% sodium chloride BOLUS (500 mLs Intravenous New Bag 5/26/21 0110)         ED Vitals:  Vitals:    05/26/21 0100 05/26/21 0245   BP: (!) 144/90 132/89   Pulse: 89 92   Temp: 100  F (37.8  C)    TempSrc: Oral    SpO2: 96%        ED labs and imaging:  Results for orders placed or performed during the hospital encounter of 05/26/21   CBC with platelets, differential     Status: None   Result Value Ref Range    WBC 5.2 4.0 - 11.0 10e9/L    RBC Count 5.14 4.4 - 5.9 10e12/L    Hemoglobin 15.4 13.3 - 17.7 g/dL    Hematocrit 45.3 40.0 - 53.0 %    MCV 88 78 - 100 fl    MCH 30.0 26.5 - 33.0 pg    MCHC 34.0 31.5 - 36.5 g/dL    RDW 12.2 10.0 - 15.0 %    Platelet Count 208 150 - 450 10e9/L    Diff Method Automated Method     % Neutrophils 60.6 %    % Lymphocytes 25.0 %    % Monocytes 11.5 %    % Eosinophils 2.1 %    % Basophils 0.6 %    % Immature Granulocytes 0.2 %    Nucleated RBCs 0 0 /100    Absolute Neutrophil 3.2 1.6 - 8.3 10e9/L    Absolute Lymphocytes 1.3 0.8 - 5.3 10e9/L    Absolute Monocytes 0.6 0.0 - 1.3 10e9/L    Absolute Eosinophils 0.1 0.0 - 0.7 10e9/L    Absolute Basophils 0.0 0.0 - 0.2 10e9/L    Abs Immature Granulocytes 0.0 0 - 0.4 10e9/L    Absolute Nucleated RBC 0.0    Comprehensive metabolic panel     Status: Abnormal   Result Value Ref Range    Sodium 136 133 - 144 mmol/L    Potassium 3.9 3.4 - 5.3 mmol/L    Chloride 104 94 - 109 mmol/L    Carbon Dioxide 25 20 - 32 mmol/L    Anion Gap 7 3 - 14 mmol/L    Glucose 101 (H) 70 - 99 mg/dL    Urea Nitrogen 15 7 - 30 mg/dL    Creatinine 0.97 0.66 - 1.25 mg/dL    GFR Estimate >90 >60 mL/min/[1.73_m2]    GFR Estimate If Black >90 >60 mL/min/[1.73_m2]    Calcium 8.5 8.5 - 10.1 mg/dL    Bilirubin Total 0.3 0.2 - 1.3 mg/dL    Albumin 4.0 3.4 - 5.0 g/dL    Protein Total 7.4 6.8 - 8.8 g/dL    Alkaline Phosphatase 71 40 - 150 U/L    ALT 29 0 - 70 U/L    AST 23 0 -  "45 U/L   Troponin I     Status: None   Result Value Ref Range    Troponin I ES <0.015 0.000 - 0.045 ug/L       Assessments & Plan (with Medical Decision Making)   Assessment Summary and Clinical Impression: 35-year-old male with a history of hypertension on hydrochlorothiazide who presented with substernal chest discomfort described as heartburn after chasing his one and half-year-old around and report of shortness of breath.  He admitted to psychosocial stressors including an on-going break-up- \"she is leaving me\".  Arrival blood pressure was 144/90 but this improved during his ED course.  He reported no palpitations or near fainting no back pain.  His work-up was reassuring.  He expressed comfort with ongoing care as an outpatient with plan to follow-up for further diagnostic testing and imaging if symptoms persist with low threshold to return with new concerns    ED Course and Plan:  Reviewed the medical record.  Chest discomfort described as heartburn with no relief with Pepcid at home he was offered a GI cocktail.  He was monitored with frequent vital signs on telemetry.  Broad differential was considered including acute coronary syndrome, aortic dissection, pulmonary embolism pneumothorax, pneumonia. EKG on arrival revealed normal sinus rhythm.  Nonspecific T wave changes without acute ischemia or arrhythmia. There is no old EKG for comparison. His work-up revealed an undetectable troponin on arrival, normal electrolytes, normal renal function, stable hemogram.  Chest imaging reviewed dependently and the radiology report was also reviewed. He was reevaluated after period of care and a trial of GI cocktail and sublingual nitroglycerin.  Patient reported his symptoms were improved although he inquired about the possibility of a stomach ulcer or reflux in the neck steps for care.  We discussed approach to managing acid reflux and GERD and that further diagnostic image including diagnostic endoscopy would be " beneficial and that he would need to call the clinic to schedule follow-up visit.  Patient expressed comfort going home with ongoing care as an outpatient.  We discussed and reviewed worrisome symptoms and reasons to return to be reevaluated emergently.  Patient expressed understanding, comfort and agreement with the plan of care.      Disclaimer: This note consists of symbols derived from keyboarding, dictation and/or voice recognition software. As a result, there may be errors in the script that have gone undetected. Please consider this when interpreting information found in this chart.  I have reviewed the nursing notes.    I have reviewed the findings, diagnosis, plan and need for follow up with the patient.       New Prescriptions    No medications on file       Final diagnoses:   Chest discomfort - Feels like heartburn   Shortness of breath - Reported to have worsened after chasing 1-1/2-year-old child around       5/26/2021   [facility] EMERGENCY DEPT     Jabier Mendiola MD  05/26/21 [id number]

## 2021-05-26 NOTE — ED TRIAGE NOTES
Pt reports generalized chest pain/discomfort, GERD symptoms, extreme anxiety and high blood pressure resulting from family stress.   Mary Grace Coronado RN on 5/26/2021 at 1:03 AM

## 2021-05-27 ENCOUNTER — TELEPHONE (OUTPATIENT)
Dept: FAMILY MEDICINE | Facility: CLINIC | Age: 36
End: 2021-05-27

## 2021-05-27 NOTE — TELEPHONE ENCOUNTER
"Call transferred to author.   Patient attempting to contact [facility] Care Team    Patient states he was evaluated in the ED yesterday for substernal chest discomfort   Patient was discharged home - ED cardiac work up did not reveal any acute conditions    Patient states since discharge he is unable to eat food without experiencing excruciating mid abdominal pain   Patient reports if he takes a bite of food he instantly experiences severe abdominal pain that takes him to his knees  Patient states he can takes sips of water but is unable to drink anything else without the pain increasing in severity   States the pain is constant and severity of pain waxes and wanes  Reports pain at it's best a 2/10 and at it's worse a 10/10  Describes the pain initially as burning and then changes to a piercing / stabbing pain  Denies pain radiating from mid abdomen    Reports taking OTC Maalox and Tylenol with no relief in symptoms  Denies heartburn symptoms    Denies nausea/vomiting  Has been passing gas today  Patient reports he has had 3-4 episodes of diarrhea - states stool is black in color with coffee ground flecks in stool - patient states this started this morning   Patient states pain to abdomen worsens with bowel movements  Last normal bowel movement was 3-4 days ago  Denies concern for constipation   Patient states approx 2 weeks ago he had bright red stools that resolved on it's own   No bright red blood noted in patient's stool today    Denies having abdominal pain like this before  Denies recent or past abdominal surgeries    Reports feeling dizzy / lightheaded  Patient reports he had a fever when in the ED and he woke this morning with a 102.9 fever - patient checked temperature with an oral thermometer   No fever that patient is aware of since this morning    Reviewed ED visit  AVS stated the following  \"3) You appear stable for discharge home at this time however if you  develop new symptoms of concern if your " "symptoms worsen you  should return to the emergency department to be reevaluated.\"    As patient has new onset black stools and worsening abdominal pain advised patient to be seen in the ED for abdominal work-up    Patient verbalized understanding and agrees with plan  Patient states he will go to the [place] ED for evaluation.     Rodrigo Phan RN    "

## 2021-06-02 DIAGNOSIS — N52.9 IMPOTENCE OF ORGANIC ORIGIN: ICD-10-CM

## 2021-06-03 RX ORDER — TADALAFIL 20 MG/1
TABLET ORAL
Qty: 6 TABLET | Refills: 3 | OUTPATIENT
Start: 2021-06-03

## 2021-06-16 ENCOUNTER — APPOINTMENT (OUTPATIENT)
Dept: CT IMAGING | Facility: CLINIC | Age: 36
End: 2021-06-16
Attending: FAMILY MEDICINE
Payer: COMMERCIAL

## 2021-06-16 ENCOUNTER — HOSPITAL ENCOUNTER (EMERGENCY)
Facility: CLINIC | Age: 36
Discharge: HOME | End: 2021-06-16
Attending: FAMILY MEDICINE | Admitting: FAMILY MEDICINE
Payer: COMMERCIAL

## 2021-06-16 ENCOUNTER — APPOINTMENT (OUTPATIENT)
Dept: GENERAL RADIOLOGY | Facility: CLINIC | Age: 36
End: 2021-06-16
Attending: FAMILY MEDICINE
Payer: COMMERCIAL

## 2021-06-16 ENCOUNTER — ANCILLARY PROCEDURE (OUTPATIENT)
Dept: ULTRASOUND IMAGING | Facility: CLINIC | Age: 36
End: 2021-06-16
Attending: FAMILY MEDICINE
Payer: COMMERCIAL

## 2021-06-16 VITALS
HEART RATE: 98 BPM | TEMPERATURE: 96.4 F | RESPIRATION RATE: 18 BRPM | OXYGEN SATURATION: 97 % | SYSTOLIC BLOOD PRESSURE: 154 MMHG | WEIGHT: 198 LBS | DIASTOLIC BLOOD PRESSURE: 105 MMHG | BODY MASS INDEX: 27.62 KG/M2

## 2021-06-16 DIAGNOSIS — S00.03XA CONTUSION OF FACE, SCALP AND NECK, INITIAL ENCOUNTER: ICD-10-CM

## 2021-06-16 DIAGNOSIS — S06.0X9A CONCUSSION WITH LOSS OF CONSCIOUSNESS, INITIAL ENCOUNTER: ICD-10-CM

## 2021-06-16 DIAGNOSIS — S00.83XA CONTUSION OF FACE, SCALP AND NECK, INITIAL ENCOUNTER: ICD-10-CM

## 2021-06-16 DIAGNOSIS — S10.93XA CONTUSION OF FACE, SCALP AND NECK, INITIAL ENCOUNTER: ICD-10-CM

## 2021-06-16 DIAGNOSIS — S80.02XA CONTUSION OF LEFT KNEE, INITIAL ENCOUNTER: ICD-10-CM
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
## 2021-06-16 DIAGNOSIS — S43.101A AC SEPARATION, RIGHT, INITIAL ENCOUNTER: ICD-10-CM

## 2021-06-16 DIAGNOSIS — S01.81XA FACIAL LACERATION, INITIAL ENCOUNTER: ICD-10-CM

## 2021-06-16 LAB
ALBUMIN SERPL-MCNC: 4.5 G/DL (ref 3.4–5)
ALP SERPL-CCNC: 74 U/L (ref 40–150)
ALT SERPL W P-5'-P-CCNC: 45 U/L (ref 0–70)
ANION GAP SERPL CALCULATED.3IONS-SCNC: 10 MMOL/L (ref 3–14)
AST SERPL W P-5'-P-CCNC: 36 U/L (ref 0–45)
BASOPHILS # BLD AUTO: 0 10E9/L (ref 0–0.2)
BASOPHILS NFR BLD AUTO: 0.5 %
BILIRUB SERPL-MCNC: 1 MG/DL (ref 0.2–1.3)
BUN SERPL-MCNC: 17 MG/DL (ref 7–30)
CALCIUM SERPL-MCNC: 9.2 MG/DL (ref 8.5–10.1)
CHLORIDE SERPL-SCNC: 103 MMOL/L (ref 94–109)
CO2 SERPL-SCNC: 24 MMOL/L (ref 20–32)
CREAT SERPL-MCNC: 0.95 MG/DL (ref 0.66–1.25)
DIFFERENTIAL METHOD BLD: NORMAL
EOSINOPHIL # BLD AUTO: 0.2 10E9/L (ref 0–0.7)
EOSINOPHIL NFR BLD AUTO: 2 %
ERYTHROCYTE [DISTWIDTH] IN BLOOD BY AUTOMATED COUNT: 12.7 % (ref 10–15)
ETHANOL SERPL-MCNC: 0.02 G/DL
GFR SERPL CREATININE-BSD FRML MDRD: >90 ML/MIN/{1.73_M2}
GLUCOSE SERPL-MCNC: 96 MG/DL (ref 70–99)
HCT VFR BLD AUTO: 42.9 % (ref 40–53)
HGB BLD-MCNC: 14.8 G/DL (ref 13.3–17.7)
IMM GRANULOCYTES # BLD: 0 10E9/L (ref 0–0.4)
IMM GRANULOCYTES NFR BLD: 0.2 %
LYMPHOCYTES # BLD AUTO: 2.3 10E9/L (ref 0.8–5.3)
LYMPHOCYTES NFR BLD AUTO: 27.1 %
MCH RBC QN AUTO: 30.3 PG (ref 26.5–33)
MCHC RBC AUTO-ENTMCNC: 34.5 G/DL (ref 31.5–36.5)
MCV RBC AUTO: 88 FL (ref 78–100)
MONOCYTES # BLD AUTO: 0.9 10E9/L (ref 0–1.3)
MONOCYTES NFR BLD AUTO: 10.5 %
NEUTROPHILS # BLD AUTO: 5.1 10E9/L (ref 1.6–8.3)
NEUTROPHILS NFR BLD AUTO: 59.7 %
NRBC # BLD AUTO: 0 10*3/UL
NRBC BLD AUTO-RTO: 0 /100
PLATELET # BLD AUTO: 269 10E9/L (ref 150–450)
POTASSIUM SERPL-SCNC: 3.4 MMOL/L (ref 3.4–5.3)
PROT SERPL-MCNC: 7.8 G/DL (ref 6.8–8.8)
RBC # BLD AUTO: 4.88 10E12/L (ref 4.4–5.9)
SODIUM SERPL-SCNC: 137 MMOL/L (ref 133–144)
WBC # BLD AUTO: 8.5 10E9/L (ref 4–11)

## 2021-06-16 PROCEDURE — 85025 COMPLETE CBC W/AUTO DIFF WBC: CPT | Performed by: FAMILY MEDICINE

## 2021-06-16 PROCEDURE — 93308 TTE F-UP OR LMTD: CPT | Performed by: FAMILY MEDICINE

## 2021-06-16 PROCEDURE — 80053 COMPREHEN METABOLIC PANEL: CPT | Performed by: FAMILY MEDICINE

## 2021-06-16 PROCEDURE — 76604 US EXAM CHEST: CPT | Mod: 26 | Performed by: FAMILY MEDICINE

## 2021-06-16 PROCEDURE — 250N000011 HC RX IP 250 OP 636: Performed by: FAMILY MEDICINE

## 2021-06-16 PROCEDURE — 72170 X-RAY EXAM OF PELVIS: CPT

## 2021-06-16 PROCEDURE — 96374 THER/PROPH/DIAG INJ IV PUSH: CPT | Mod: 59 | Performed by: FAMILY MEDICINE

## 2021-06-16 PROCEDURE — 93308 TTE F-UP OR LMTD: CPT | Mod: 26 | Performed by: FAMILY MEDICINE

## 2021-06-16 PROCEDURE — 99285 EMERGENCY DEPT VISIT HI MDM: CPT | Mod: 25 | Performed by: FAMILY MEDICINE

## 2021-06-16 PROCEDURE — 73030 X-RAY EXAM OF SHOULDER: CPT | Mod: RT

## 2021-06-16 PROCEDURE — 250N000009 HC RX 250: Performed by: FAMILY MEDICINE

## 2021-06-16 PROCEDURE — 70486 CT MAXILLOFACIAL W/O DYE: CPT

## 2021-06-16 PROCEDURE — 76705 ECHO EXAM OF ABDOMEN: CPT | Mod: 26 | Performed by: FAMILY MEDICINE

## 2021-06-16 PROCEDURE — 73560 X-RAY EXAM OF KNEE 1 OR 2: CPT | Mod: LT

## 2021-06-16 PROCEDURE — 12011 RPR F/E/E/N/L/M 2.5 CM/<: CPT | Performed by: FAMILY MEDICINE

## 2021-06-16 PROCEDURE — 82077 ASSAY SPEC XCP UR&BREATH IA: CPT | Performed by: FAMILY MEDICINE

## 2021-06-16 PROCEDURE — 76604 US EXAM CHEST: CPT | Performed by: FAMILY MEDICINE

## 2021-06-16 PROCEDURE — 70450 CT HEAD/BRAIN W/O DYE: CPT

## 2021-06-16 PROCEDURE — 76705 ECHO EXAM OF ABDOMEN: CPT | Performed by: FAMILY MEDICINE

## 2021-06-16 PROCEDURE — 72125 CT NECK SPINE W/O DYE: CPT

## 2021-06-16 PROCEDURE — 71046 X-RAY EXAM CHEST 2 VIEWS: CPT

## 2021-06-16 PROCEDURE — 250N000013 HC RX MED GY IP 250 OP 250 PS 637: Performed by: FAMILY MEDICINE

## 2021-06-16 RX ORDER — KETOROLAC TROMETHAMINE 15 MG/ML
15 INJECTION, SOLUTION INTRAMUSCULAR; INTRAVENOUS ONCE
Status: COMPLETED | OUTPATIENT
Start: 2021-06-16 | End: 2021-06-16

## 2021-06-16 RX ORDER — SULINDAC 200 MG/1
200 TABLET ORAL ONCE
Status: COMPLETED | OUTPATIENT
Start: 2021-06-16 | End: 2021-06-16

## 2021-06-16 RX ORDER — HYDROMORPHONE HYDROCHLORIDE 1 MG/ML
0.5 INJECTION, SOLUTION INTRAMUSCULAR; INTRAVENOUS; SUBCUTANEOUS
Status: DISCONTINUED | OUTPATIENT
Start: 2021-06-16 | End: 2021-06-17 | Stop reason: HOSPADM

## 2021-06-16 RX ORDER — METHYLCELLULOSE 4000CPS 30 %
POWDER (GRAM) MISCELLANEOUS ONCE
Status: COMPLETED | OUTPATIENT
Start: 2021-06-16 | End: 2021-06-16

## 2021-06-16 RX ORDER — OXYCODONE HYDROCHLORIDE 5 MG/1
5 TABLET ORAL EVERY 4 HOURS PRN
Status: DISCONTINUED | OUTPATIENT
Start: 2021-06-16 | End: 2021-06-17 | Stop reason: HOSPADM

## 2021-06-16 RX ADMIN — KETOROLAC TROMETHAMINE 15 MG: 15 INJECTION, SOLUTION INTRAMUSCULAR; INTRAVENOUS at 17:28

## 2021-06-16 RX ADMIN — OXYCODONE HYDROCHLORIDE 5 MG: 5 TABLET ORAL at 21:46

## 2021-06-16 RX ADMIN — SULINDAC 200 MG: 200 TABLET ORAL at 21:46

## 2021-06-16 RX ADMIN — Medication 150 MG: at 17:44

## 2021-06-16 RX ADMIN — EPINEPHRINE BITARTRATE 3 ML: 1 POWDER at 17:44

## 2021-06-16 ASSESSMENT — ENCOUNTER SYMPTOMS
SHORTNESS OF BREATH: 0
NAUSEA: 0
VOMITING: 0
CHILLS: 0
DIAPHORESIS: 0
FREQUENCY: 0
ABDOMINAL PAIN: 0
FEVER: 0
WHEEZING: 0
HEADACHES: 0
DIARRHEA: 0
DYSURIA: 0
BLOOD IN STOOL: 0
COUGH: 0
SORE THROAT: 0
CONSTIPATION: 0
SINUS PRESSURE: 0
PALPITATIONS: 0
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
## 2021-06-17 ENCOUNTER — PATIENT OUTREACH (OUTPATIENT)
Dept: CARE COORDINATION | Facility: CLINIC | Age: 36
End: 2021-06-17

## 2021-06-17 DIAGNOSIS — Z71.89 OTHER SPECIFIED COUNSELING: ICD-10-CM
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
## 2021-06-17 RX ORDER — OXYCODONE HYDROCHLORIDE 5 MG/1
5 TABLET ORAL EVERY 6 HOURS PRN
Qty: 6 TABLET | Refills: 0 | Status: SHIPPED | OUTPATIENT
Start: 2021-06-17 | End: 2021-07-06

## 2021-06-17 RX ORDER — SULINDAC 200 MG/1
200 TABLET ORAL 2 TIMES DAILY
Qty: 28 TABLET | Refills: 0 | Status: SHIPPED | OUTPATIENT
Start: 2021-06-17 | End: 2021-10-01

## 2021-06-17 RX ORDER — SULINDAC 200 MG/1
200 TABLET ORAL 2 TIMES DAILY WITH MEALS
Qty: 20 TABLET | Refills: 0 | Status: SHIPPED | OUTPATIENT
Start: 2021-06-17 | End: 2021-10-01
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
## 2021-06-17 NOTE — TELEPHONE ENCOUNTER
Left message on answering machine to return call. Direct line provided. Need to let patient know script was sent.  Anisha Foley RN
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
## 2021-06-17 NOTE — PROGRESS NOTES
Clinic Care Coordination Contact  Community Health Worker Initial Outreach    CHW Initial Information Gathering:  Referral Source: ED Follow-Up  Preferred Hospital: [place], [place]  [phone]  Preferred Urgent Care: Other  Current living arrangement:: I live in a private home with family       Patient accepts CC: No, due to the patient stating that he was wanting to find out if he had a prescription sent to a pharmacy. The patient was informed that he was supposed to take 2 doses, but only was given one dose while in the ED.     The CHW informed the patient about CCC and at this time he is not needing assistance from CCC. The CHW will close the referral at this time.    The CHW will forward this to the team to review and advise.

## 2021-06-21 DIAGNOSIS — N52.9 IMPOTENCE OF ORGANIC ORIGIN: ICD-10-CM

## 2021-06-23 ENCOUNTER — OFFICE VISIT (OUTPATIENT)
Dept: ORTHOPEDICS | Facility: CLINIC | Age: 36
End: 2021-06-23
Payer: COMMERCIAL

## 2021-06-23 VITALS
BODY MASS INDEX: 27.72 KG/M2 | WEIGHT: 198 LBS | HEIGHT: 71 IN | SYSTOLIC BLOOD PRESSURE: 141 MMHG | DIASTOLIC BLOOD PRESSURE: 92 MMHG

## 2021-06-23 DIAGNOSIS — S43.101A AC SEPARATION, RIGHT, INITIAL ENCOUNTER: ICD-10-CM

## 2021-06-23 PROCEDURE — 99204 OFFICE O/P NEW MOD 45 MIN: CPT | Performed by: PEDIATRICS

## 2021-06-23 RX ORDER — TADALAFIL 20 MG/1
TABLET ORAL
Qty: 6 TABLET | Refills: 3 | Status: SHIPPED | OUTPATIENT
Start: 2021-06-23 | End: 2021-07-14

## 2021-06-23 ASSESSMENT — MIFFLIN-ST. JEOR: SCORE: 1845.25

## 2021-06-23 NOTE — PATIENT INSTRUCTIONS
Khurram to follow up with Primary Care provider regarding elevated blood pressure.    We discussed these other possible diagnosis: likely type 2-3 AC separation    Plan:  - Today's Plan of Care:  Sling for comfort - can wean after ~ 1 week  Range of motion exercises (pendulum)  Limit lifting and overhead activity  Work Letter    -We also discussed other future treatment options:  Referral to Physical Therapy  MRI of right shoulder    Follow Up: 2-3 weeks    If you have any further questions for your physician or physician s care team you can call [phone] and use option 3 to leave a voice message. Calls received during business hours will be returned same day.
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
## 2021-06-23 NOTE — TELEPHONE ENCOUNTER
Routing refill request to provider for review/approval because:  Last written 5/11/21 for #6 + 3 refills.  Advise.  KpavelHARSHAN

## 2021-06-23 NOTE — LETTER
June 23, 2021      Germain DIAZ Myrtle  [address] [facility] [address]        To Whom It May Concern,     Khurram is under my care for right shoulder injury.  He should be off work at this time.  Follow up will be in 2-3 weeks.      Sincerely,        Asiya Hawk MD
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
## 2021-06-28 ENCOUNTER — HOSPITAL ENCOUNTER (EMERGENCY)
Facility: CLINIC | Age: 36
Discharge: HOME | End: 2021-06-28
Attending: EMERGENCY MEDICINE | Admitting: EMERGENCY MEDICINE
Payer: COMMERCIAL

## 2021-06-28 VITALS
RESPIRATION RATE: 18 BRPM | DIASTOLIC BLOOD PRESSURE: 105 MMHG | HEART RATE: 100 BPM | OXYGEN SATURATION: 96 % | SYSTOLIC BLOOD PRESSURE: 146 MMHG | TEMPERATURE: 98.4 F

## 2021-06-28 DIAGNOSIS — L03.116 CELLULITIS OF LEFT KNEE: ICD-10-CM

## 2021-06-28 DIAGNOSIS — S81.002A OPEN WOUND OF LEFT KNEE, INITIAL ENCOUNTER: ICD-10-CM

## 2021-06-28 PROCEDURE — 99283 EMERGENCY DEPT VISIT LOW MDM: CPT | Performed by: EMERGENCY MEDICINE

## 2021-06-28 PROCEDURE — 99284 EMERGENCY DEPT VISIT MOD MDM: CPT | Performed by: EMERGENCY MEDICINE

## 2021-06-28 PROCEDURE — 250N000013 HC RX MED GY IP 250 OP 250 PS 637: Performed by: EMERGENCY MEDICINE

## 2021-06-28 RX ORDER — CEFUROXIME AXETIL 250 MG/1
500 TABLET ORAL ONCE
Status: COMPLETED | OUTPATIENT
Start: 2021-06-28 | End: 2021-06-28

## 2021-06-28 RX ORDER — CEFUROXIME AXETIL 500 MG/1
500 TABLET ORAL 2 TIMES DAILY
Qty: 9 TABLET | Refills: 0 | Status: SHIPPED | OUTPATIENT
Start: 2021-06-28 | End: 2021-07-06

## 2021-06-28 RX ORDER — CEFUROXIME AXETIL 500 MG/1
500 TABLET ORAL 2 TIMES DAILY
Qty: 9 TABLET | Refills: 0 | Status: SHIPPED | OUTPATIENT
Start: 2021-06-28 | End: 2021-06-28

## 2021-06-28 RX ADMIN — CEFUROXIME AXETIL 500 MG: 250 TABLET ORAL at 01:08

## 2021-06-28 ASSESSMENT — ENCOUNTER SYMPTOMS
NUMBNESS: 0
FEVER: 0
DYSPHORIC MOOD: 0
ABDOMINAL PAIN: 0
WEAKNESS: 0
CHEST TIGHTNESS: 0
WOUND: 1
CHILLS: 0
COUGH: 0
FATIGUE: 0
NAUSEA: 0
SHORTNESS OF BREATH: 0
VOMITING: 0
APPETITE CHANGE: 0
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
## 2021-06-28 NOTE — DISCHARGE INSTRUCTIONS
The dressing on your left knee should be changed twice daily.  When you change the dressing, wash the area with soap and water and drying by patting with a clean towel.  Apply a fresh layer of antibiotic ointment and a dressing to the area.  Continue dressing changes until wound is healed.

## 2021-07-05 ENCOUNTER — HOSPITAL ENCOUNTER (EMERGENCY)
Facility: CLINIC | Age: 36
Discharge: HOME | End: 2021-07-06
Attending: EMERGENCY MEDICINE | Admitting: EMERGENCY MEDICINE
Payer: COMMERCIAL

## 2021-07-05 VITALS
RESPIRATION RATE: 18 BRPM | OXYGEN SATURATION: 97 % | HEIGHT: 71 IN | TEMPERATURE: 98.8 F | HEART RATE: 105 BPM | BODY MASS INDEX: 28 KG/M2 | DIASTOLIC BLOOD PRESSURE: 87 MMHG | WEIGHT: 200 LBS | SYSTOLIC BLOOD PRESSURE: 129 MMHG

## 2021-07-05 DIAGNOSIS — T07.XXXA MULTIPLE ABRASIONS: ICD-10-CM

## 2021-07-05 DIAGNOSIS — L03.115 CELLULITIS OF RIGHT LOWER EXTREMITY: ICD-10-CM

## 2021-07-05 PROCEDURE — 99284 EMERGENCY DEPT VISIT MOD MDM: CPT | Performed by: EMERGENCY MEDICINE

## 2021-07-05 PROCEDURE — 250N000013 HC RX MED GY IP 250 OP 250 PS 637: Performed by: EMERGENCY MEDICINE

## 2021-07-05 PROCEDURE — 250N000009 HC RX 250: Performed by: EMERGENCY MEDICINE

## 2021-07-05 PROCEDURE — 99283 EMERGENCY DEPT VISIT LOW MDM: CPT | Performed by: EMERGENCY MEDICINE

## 2021-07-05 RX ORDER — LIDOCAINE HYDROCHLORIDE 20 MG/ML
JELLY TOPICAL ONCE
Status: COMPLETED | OUTPATIENT
Start: 2021-07-05 | End: 2021-07-05

## 2021-07-05 RX ADMIN — IBUPROFEN 600 MG: 200 TABLET, FILM COATED ORAL at 23:14

## 2021-07-05 RX ADMIN — LIDOCAINE HYDROCHLORIDE: 20 JELLY TOPICAL at 22:57

## 2021-07-05 ASSESSMENT — MIFFLIN-ST. JEOR: SCORE: 1854.32

## 2021-07-06 ENCOUNTER — APPOINTMENT (OUTPATIENT)
Dept: CT IMAGING | Facility: CLINIC | Age: 36
End: 2021-07-06
Attending: EMERGENCY MEDICINE
Payer: COMMERCIAL

## 2021-07-06 VITALS
RESPIRATION RATE: 13 BRPM | SYSTOLIC BLOOD PRESSURE: 123 MMHG | HEART RATE: 76 BPM | DIASTOLIC BLOOD PRESSURE: 98 MMHG | OXYGEN SATURATION: 99 %

## 2021-07-06 DIAGNOSIS — R56.9 SEIZURES (H): ICD-10-CM

## 2021-07-06 DIAGNOSIS — R82.5 POSITIVE URINE DRUG SCREEN: ICD-10-CM

## 2021-07-06 LAB
AMPHETAMINES UR QL SCN: POSITIVE
ANION GAP SERPL CALCULATED.3IONS-SCNC: 13 MMOL/L (ref 3–14)
BARBITURATES UR QL: NEGATIVE
BASOPHILS # BLD AUTO: 0.1 10E9/L (ref 0–0.2)
BASOPHILS NFR BLD AUTO: 0.9 %
BENZODIAZ UR QL: NEGATIVE
BUN SERPL-MCNC: 20 MG/DL (ref 7–30)
CALCIUM SERPL-MCNC: 9.6 MG/DL (ref 8.5–10.1)
CANNABINOIDS UR QL SCN: NEGATIVE
CHLORIDE SERPL-SCNC: 104 MMOL/L (ref 94–109)
CO2 SERPL-SCNC: 21 MMOL/L (ref 20–32)
COCAINE UR QL: NEGATIVE
CREAT SERPL-MCNC: 0.92 MG/DL (ref 0.66–1.25)
DIFFERENTIAL METHOD BLD: ABNORMAL
EOSINOPHIL # BLD AUTO: 0.8 10E9/L (ref 0–0.7)
EOSINOPHIL NFR BLD AUTO: 6.6 %
ERYTHROCYTE [DISTWIDTH] IN BLOOD BY AUTOMATED COUNT: 13.3 % (ref 10–15)
GFR SERPL CREATININE-BSD FRML MDRD: >90 ML/MIN/{1.73_M2}
GLUCOSE SERPL-MCNC: 128 MG/DL (ref 70–99)
HCT VFR BLD AUTO: 49.6 % (ref 40–53)
HGB BLD-MCNC: 15.9 G/DL (ref 13.3–17.7)
IMM GRANULOCYTES # BLD: 0.1 10E9/L (ref 0–0.4)
IMM GRANULOCYTES NFR BLD: 0.4 %
LACTATE BLD-SCNC: 0.8 MMOL/L (ref 0.7–2)
LACTATE BLD-SCNC: 10.1 MMOL/L (ref 0.7–2)
LYMPHOCYTES # BLD AUTO: 4.5 10E9/L (ref 0.8–5.3)
LYMPHOCYTES NFR BLD AUTO: 35.9 %
MCH RBC QN AUTO: 30.4 PG (ref 26.5–33)
MCHC RBC AUTO-ENTMCNC: 32.1 G/DL (ref 31.5–36.5)
MCV RBC AUTO: 95 FL (ref 78–100)
MONOCYTES # BLD AUTO: 1.4 10E9/L (ref 0–1.3)
MONOCYTES NFR BLD AUTO: 11.1 %
NEUTROPHILS # BLD AUTO: 5.7 10E9/L (ref 1.6–8.3)
NEUTROPHILS NFR BLD AUTO: 45.1 %
NRBC # BLD AUTO: 0 10*3/UL
NRBC BLD AUTO-RTO: 0 /100
OPIATES UR QL SCN: NEGATIVE
PCP UR QL SCN: NEGATIVE
PLATELET # BLD AUTO: 437 10E9/L (ref 150–450)
POTASSIUM SERPL-SCNC: 3.3 MMOL/L (ref 3.4–5.3)
RBC # BLD AUTO: 5.23 10E12/L (ref 4.4–5.9)
SODIUM SERPL-SCNC: 138 MMOL/L (ref 133–144)
WBC # BLD AUTO: 12.7 10E9/L (ref 4–11)

## 2021-07-06 PROCEDURE — 83605 ASSAY OF LACTIC ACID: CPT | Performed by: EMERGENCY MEDICINE

## 2021-07-06 PROCEDURE — 96361 HYDRATE IV INFUSION ADD-ON: CPT | Performed by: EMERGENCY MEDICINE

## 2021-07-06 PROCEDURE — 93010 ELECTROCARDIOGRAM REPORT: CPT | Performed by: EMERGENCY MEDICINE

## 2021-07-06 PROCEDURE — 70450 CT HEAD/BRAIN W/O DYE: CPT

## 2021-07-06 PROCEDURE — 93005 ELECTROCARDIOGRAM TRACING: CPT | Performed by: EMERGENCY MEDICINE

## 2021-07-06 PROCEDURE — 99285 EMERGENCY DEPT VISIT HI MDM: CPT | Mod: 25 | Performed by: EMERGENCY MEDICINE

## 2021-07-06 PROCEDURE — 999N000157 HC STATISTIC RCP TIME EA 10 MIN

## 2021-07-06 PROCEDURE — 96374 THER/PROPH/DIAG INJ IV PUSH: CPT | Performed by: EMERGENCY MEDICINE

## 2021-07-06 PROCEDURE — 85025 COMPLETE CBC W/AUTO DIFF WBC: CPT | Performed by: EMERGENCY MEDICINE

## 2021-07-06 PROCEDURE — 80307 DRUG TEST PRSMV CHEM ANLYZR: CPT | Performed by: EMERGENCY MEDICINE

## 2021-07-06 PROCEDURE — 258N000003 HC RX IP 258 OP 636: Performed by: EMERGENCY MEDICINE

## 2021-07-06 PROCEDURE — 250N000011 HC RX IP 250 OP 636: Performed by: EMERGENCY MEDICINE

## 2021-07-06 PROCEDURE — 80048 BASIC METABOLIC PNL TOTAL CA: CPT | Performed by: EMERGENCY MEDICINE

## 2021-07-06 RX ORDER — LORAZEPAM 2 MG/ML
1 INJECTION INTRAMUSCULAR ONCE
Status: COMPLETED | OUTPATIENT
Start: 2021-07-06 | End: 2021-07-06

## 2021-07-06 RX ORDER — SODIUM CHLORIDE, SODIUM LACTATE, POTASSIUM CHLORIDE, CALCIUM CHLORIDE 600; 310; 30; 20 MG/100ML; MG/100ML; MG/100ML; MG/100ML
1000 INJECTION, SOLUTION INTRAVENOUS CONTINUOUS
Status: DISCONTINUED | OUTPATIENT
Start: 2021-07-06 | End: 2021-07-06 | Stop reason: HOSPADM

## 2021-07-06 RX ORDER — CEFUROXIME AXETIL 500 MG/1
500 TABLET ORAL 2 TIMES DAILY
Qty: 10 TABLET | Refills: 0 | Status: SHIPPED | OUTPATIENT
Start: 2021-07-06 | End: 2021-10-01

## 2021-07-06 RX ADMIN — SODIUM CHLORIDE, POTASSIUM CHLORIDE, SODIUM LACTATE AND CALCIUM CHLORIDE 1000 ML: 600; 310; 30; 20 INJECTION, SOLUTION INTRAVENOUS at 12:03

## 2021-07-06 RX ADMIN — SODIUM CHLORIDE, POTASSIUM CHLORIDE, SODIUM LACTATE AND CALCIUM CHLORIDE 1000 ML: 600; 310; 30; 20 INJECTION, SOLUTION INTRAVENOUS at 14:07

## 2021-07-06 RX ADMIN — LORAZEPAM 1 MG: 2 INJECTION INTRAMUSCULAR; INTRAVENOUS at 12:03

## 2021-07-06 ASSESSMENT — ENCOUNTER SYMPTOMS
SEIZURES: 1
NAUSEA: 0
ENDOCRINE NEGATIVE: 1
CHILLS: 0
SORE THROAT: 0
WOUND: 1
MUSCULOSKELETAL NEGATIVE: 1
RESPIRATORY NEGATIVE: 1
GASTROINTESTINAL NEGATIVE: 1
HEADACHES: 1
CONSTITUTIONAL NEGATIVE: 1
COLOR CHANGE: 1
SHORTNESS OF BREATH: 0
FEVER: 0
EYES NEGATIVE: 1
ALLERGIC/IMMUNOLOGIC NEGATIVE: 1
HEMATOLOGIC/LYMPHATIC NEGATIVE: 1
VOMITING: 0
MYALGIAS: 0
CARDIOVASCULAR NEGATIVE: 1
PSYCHIATRIC NEGATIVE: 1
DIFFICULTY URINATING: 0

## 2021-07-06 NOTE — DISCHARGE INSTRUCTIONS
1) Your evaluation today did not reveal the cause of your seizure today.  Imaging was normal.  Blood work was also normal and did confirm that you truly had a seizure event.  After discussion with general neurology is recommended that there is no clear indication to begin taking medication to prevent seizure    2) You are discharged to home with plan to be seen in the general neurology clinic for follow-up for new onset seizure. A referral was placed.  An appointment should be confirmed within the next month.    3) we discussed  [place] state law with driving after first-time seizure.  By law you are not allowed to drive for  3 months.     4) Although the exact cause of your seizure event today is not clear you appear stable for discharge to home.  If you have recurrence of seizure you will need to return to be reevaluated and medication for seizure prevention may be required.
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
## 2021-07-06 NOTE — DISCHARGE INSTRUCTIONS
Take antibiotic as prescribed.  You should follow-up in clinic later this week to have your wounds reexamined.  Apply the wet-to-dry dressings as we discussed.  Change these daily.    If your symptoms worsen, you should return to emergency department for further evaluation and treatment.   Secondary Defect Length (In Cm): 0
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
## 2021-07-06 NOTE — ED NOTES
Returned from ct, A&Ox4  States hasfelt twitchy and shaky all morning  No previous h/o seizures  Denies HA, no n/v  Given bacitracin for bilateral knee wounds  Seizure pads in place  On monitors

## 2021-07-06 NOTE — ED NOTES
DATE:  7/6/2021   TIME OF RECEIPT FROM LAB:  1221  LAB TEST:  lactic  LAB VALUE:  10.1  RESULTS GIVEN WITH READ-BACK TO (PROVIDER):  Julian MONTELONGO LAB VALUE REPORTED TO PROVIDER:   1221

## 2021-07-06 NOTE — ED NOTES
"Debridement/Irrigation attempted by ERT to bilateral knees. Pt states he is unable to tolerate the procedure. \"I'll just go home and soak in the bathtub.\" Provider updated.    "

## 2021-07-07 ENCOUNTER — PATIENT OUTREACH (OUTPATIENT)
Dept: CARE COORDINATION | Facility: CLINIC | Age: 36
End: 2021-07-07

## 2021-07-07 DIAGNOSIS — Z71.89 OTHER SPECIFIED COUNSELING: ICD-10-CM
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
## 2021-07-07 NOTE — LETTER
[facility] CARE COORDINATION  [id number]  [facility] [address]    July 9, 2021    Germain Iraheta  [address] [place] [facility] [address]      Dear Germain,    I am a clinic community health worker who works with KRISTINA Jones CNP at [facility]. I have been trying to reach you recently to introduce Clinic Care Coordination and to see if there was anything I could assist you with.  Below is a description of clinic care coordination and how I can further assist you.      The clinic care coordination team is made up of a registered nurse,  and community health worker who understand the health care system. The goal of clinic care coordination is to help you manage your health and improve access to the health care system in the most efficient manner. The team can assist you in meeting your health care goals by providing education, coordinating services, strengthening the communication among your providers and supporting you with any resource needs.    Please feel free to contact me at [phone] with any questions or concerns. We are focused on providing you with the highest-quality healthcare experience possible and that all starts with you.     Sincerely,       JESSICA Middleton  Clinic Care Coordination  [facility]

## 2021-07-07 NOTE — PROGRESS NOTES
Clinic Care Coordination Contact  [facility]/Voicemail       Clinical Data: Care Coordinator Outreach  Outreach attempted x 1.  Left message on patient's voicemail with call back information and requested return call.  Plan: Care Coordinator will try to reach patient again in 1-2 business days.

## 2021-07-08 NOTE — PROGRESS NOTES
Clinic Care Coordination Contact  [facility]/Voicemail    The patient left a voicemail for the CHW asking for a return call.    Clinical Data: Care Coordinator Outreach  Outreach attempted x 1.  Left message on patient's voicemail with call back information and requested return call.  Plan: Care Coordinator will try to reach patient again in 1-2 business days.

## 2021-07-09 ENCOUNTER — OFFICE VISIT (OUTPATIENT)
Dept: ORTHOPEDICS | Facility: CLINIC | Age: 36
End: 2021-07-09
Payer: COMMERCIAL

## 2021-07-09 VITALS
HEIGHT: 71 IN | HEART RATE: 84 BPM | DIASTOLIC BLOOD PRESSURE: 90 MMHG | BODY MASS INDEX: 28 KG/M2 | SYSTOLIC BLOOD PRESSURE: 138 MMHG | WEIGHT: 200 LBS

## 2021-07-09 DIAGNOSIS — S43.101D ACROMIOCLAVICULAR SEPARATION, RIGHT, SUBSEQUENT ENCOUNTER: Primary | ICD-10-CM

## 2021-07-09 PROCEDURE — 99213 OFFICE O/P EST LOW 20 MIN: CPT | Performed by: PEDIATRICS

## 2021-07-09 ASSESSMENT — PAIN SCALES - GENERAL: PAINLEVEL: NO PAIN (0)

## 2021-07-09 ASSESSMENT — MIFFLIN-ST. JEOR: SCORE: 1854.32

## 2021-07-09 NOTE — PROGRESS NOTES
Clinic Care Coordination Contact  [facility]/Voicemail       Clinical Data: Care Coordinator Outreach  Outreach attempted x 2.  Left message on patient's voicemail with call back information and requested return call.  Plan: Care Coordinator will send unable to contact letter with care coordinator contact information via [employer]. Care Coordinator will do no further outreaches at this time.
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
## 2021-07-09 NOTE — PATIENT INSTRUCTIONS
Plan:  - Today's Plan of Care:  Rehab: Physical Therapy: [facility] - [phone]  Limit heavy lifting with right arm    -We also discussed other future treatment options:  MRI right shoulder    Follow Up: 1 month, sooner if needed    If you have any further questions for your physician or physician s care team you can call [phone] and use option 3 to leave a voice message. Calls received during business hours will be returned same day.

## 2021-07-11 DIAGNOSIS — N52.9 IMPOTENCE OF ORGANIC ORIGIN: ICD-10-CM

## 2021-07-14 RX ORDER — TADALAFIL 20 MG/1
TABLET ORAL
Qty: 6 TABLET | Refills: 3 | Status: SHIPPED | OUTPATIENT
Start: 2021-07-14 | End: 2021-08-07

## 2021-08-04 DIAGNOSIS — N52.9 IMPOTENCE OF ORGANIC ORIGIN: ICD-10-CM

## 2021-08-07 DIAGNOSIS — N52.9 IMPOTENCE OF ORGANIC ORIGIN: ICD-10-CM

## 2021-08-07 RX ORDER — TADALAFIL 20 MG/1
TABLET ORAL
Qty: 18 TABLET | Refills: 0 | Status: SHIPPED | OUTPATIENT
Start: 2021-08-07 | End: 2021-08-11

## 2021-08-11 RX ORDER — TADALAFIL 20 MG/1
TABLET ORAL
Qty: 18 TABLET | Refills: 0 | Status: SHIPPED | OUTPATIENT
Start: 2021-08-11 | End: 2021-08-20

## 2021-08-20 DIAGNOSIS — N52.9 IMPOTENCE OF ORGANIC ORIGIN: ICD-10-CM

## 2021-08-20 RX ORDER — TADALAFIL 20 MG/1
TABLET ORAL
Qty: 18 TABLET | Refills: 0 | Status: SHIPPED | OUTPATIENT
Start: 2021-08-20 | End: 2021-09-15

## 2021-08-31 DIAGNOSIS — N52.9 IMPOTENCE OF ORGANIC ORIGIN: ICD-10-CM

## 2021-08-31 RX ORDER — TADALAFIL 20 MG/1
TABLET ORAL
Qty: 18 TABLET | Refills: 0 | OUTPATIENT
Start: 2021-08-31

## 2021-09-09 DIAGNOSIS — N52.9 IMPOTENCE OF ORGANIC ORIGIN: ICD-10-CM

## 2021-09-15 RX ORDER — TADALAFIL 20 MG/1
TABLET ORAL
Qty: 18 TABLET | Refills: 0 | Status: SHIPPED | OUTPATIENT
Start: 2021-09-15 | End: 2021-10-01

## 2021-09-18 ENCOUNTER — HEALTH MAINTENANCE LETTER (OUTPATIENT)
Age: 36
End: 2021-09-18

## 2021-10-01 ENCOUNTER — OFFICE VISIT (OUTPATIENT)
Dept: FAMILY MEDICINE | Facility: CLINIC | Age: 36
End: 2021-10-01
Payer: COMMERCIAL

## 2021-10-01 VITALS
OXYGEN SATURATION: 96 % | DIASTOLIC BLOOD PRESSURE: 86 MMHG | HEART RATE: 90 BPM | SYSTOLIC BLOOD PRESSURE: 138 MMHG | RESPIRATION RATE: 16 BRPM | BODY MASS INDEX: 28.45 KG/M2 | WEIGHT: 204 LBS

## 2021-10-01 DIAGNOSIS — N52.9 IMPOTENCE OF ORGANIC ORIGIN: Primary | ICD-10-CM

## 2021-10-01 DIAGNOSIS — G47.62 NOCTURNAL LEG CRAMPS: ICD-10-CM

## 2021-10-01 DIAGNOSIS — R25.2 BILATERAL LEG CRAMPS: ICD-10-CM

## 2021-10-01 DIAGNOSIS — G47.00 PERSISTENT DISORDER OF INITIATING OR MAINTAINING SLEEP: ICD-10-CM

## 2021-10-01 DIAGNOSIS — I10 ESSENTIAL HYPERTENSION: ICD-10-CM

## 2021-10-01 PROCEDURE — 99214 OFFICE O/P EST MOD 30 MIN: CPT | Performed by: NURSE PRACTITIONER

## 2021-10-01 RX ORDER — TRAZODONE HYDROCHLORIDE 100 MG/1
200 TABLET ORAL AT BEDTIME
Qty: 180 TABLET | Refills: 3 | Status: SHIPPED | OUTPATIENT
Start: 2021-10-01

## 2021-10-01 RX ORDER — TADALAFIL 20 MG/1
20 TABLET ORAL DAILY PRN
Qty: 60 TABLET | Refills: 4 | Status: SHIPPED | OUTPATIENT
Start: 2021-10-01 | End: 2022-05-20

## 2021-10-01 RX ORDER — AMLODIPINE BESYLATE 2.5 MG/1
2.5 TABLET ORAL DAILY
Qty: 90 TABLET | Refills: 3 | Status: SHIPPED | OUTPATIENT
Start: 2021-10-01

## 2021-10-01 RX ORDER — GABAPENTIN 800 MG/1
800 TABLET ORAL AT BEDTIME
Qty: 90 TABLET | Refills: 3 | Status: SHIPPED | OUTPATIENT
Start: 2021-10-01 | End: 2022-08-03

## 2021-10-01 NOTE — PROGRESS NOTES
"    Assessment & Plan     Impotence of organic origin  Likely secondary to anxiety or ptsd- declines therapy referral  - tadalafil (CIALIS) 20 MG tablet; Take 1 tablet (20 mg) by mouth daily as needed (30-60 minutes prior to interourse)    Nocturnal leg cramps  Trial increase in dose:  - gabapentin (NEURONTIN) 800 MG tablet; Take 1 tablet (800 mg) by mouth At Bedtime    Bilateral leg cramps    - gabapentin (NEURONTIN) 800 MG tablet; Take 1 tablet (800 mg) by mouth At Bedtime    Persistent disorder of initiating or maintaining sleep    - traZODone (DESYREL) 100 MG tablet; Take 2 tablets (200 mg) by mouth At Bedtime    Essential hypertension    - amLODIPine (NORVASC) 2.5 MG tablet; Take 1 tablet (2.5 mg) by mouth daily             Tobacco Cessation:   reports that he has been smoking cigarettes. He has a 1.50 pack-year smoking history. He has never used smokeless tobacco.  Tobacco Cessation Action Plan: Information offered: Patient not interested at this time    BMI:   Estimated body mass index is 28.45 kg/m  as calculated from the following:    Height as of 7/9/21: 1.803 m (5' 11\").    Weight as of this encounter: 92.5 kg (204 lb).       FUTURE APPOINTMENTS:       - Follow-up for annual visit or as needed  See Patient Instructions    No follow-ups on file.    KRISTINA Ayala CNP  [facility]    Rosa Isela Paulson is a 35 year old who presents for the following health issues     HPI     Hypertension Follow-up      Do you check your blood pressure regularly outside of the clinic? No     Are you following a low salt diet? No    Are your blood pressures ever more than 140 on the top number (systolic) OR more   than 90 on the bottom number (diastolic), for example 140/90? Yes      Medication Followup of Gabapentin    Taking Medication as prescribed: yes    Side Effects:  None    Medication Helping Symptoms:  Used to work better- restless legs and cramps     Medication Followup of " "Trazodone    Taking Medication as prescribed: yes    Side Effects:  None    Medication Helping Symptoms:  yes     Medication Followup of Cialis    Taking Medication as prescribed: NO- sometimes takes 2-3 at a time. When he does this he denies dizziness, syncope, erection longer than 4 hours.     Side Effects:  None    Medication Helping Symptoms:  Yes, feel's like he is building a \"tolerance\" to it    Review of Systems   Constitutional, HEENT, cardiovascular, pulmonary, gi and gu systems are negative, except as otherwise noted.      Objective    /86   Pulse 90   Resp 16   Wt 92.5 kg (204 lb)   SpO2 96%   BMI 28.45 kg/m    Body mass index is 28.45 kg/m .  Physical Exam   GENERAL: healthy, alert and no distress  RESP: lungs clear to auscultation - no rales, rhonchi or wheezes  CV: regular rates and rhythm  NEURO: Normal strength and tone, mentation intact and speech normal                "

## 2021-10-01 NOTE — PATIENT INSTRUCTIONS
[email or web address]        Patient Education     Insomnia  Insomnia is repeated difficulty going to sleep or staying asleep, or both. Whether you have insomnia is not defined by a specific amount of sleep. Different people need different amounts of sleep, and you may need more or less sleep at different times of your life.  There are 3 major types of insomnia: short-term, chronic, and  other.  Short-term, or acute insomnia lasts less than 3 months. The symptoms are temporary and can be linked directly to a stressor, such as the death of a loved one, financial problems, or a new physical problem. Short-term insomnia stops when the stressor resolves or the person adapts to its presence.  Chronic insomnia occurs at least 3 times a week and lasts longer than 3 months. Chronic insomnia can occur when either the cause of the sleeping problem is not clear, or the insomnia does not get better when the stressor is resolved. A number of other criteria are also used to make the diagnosis of chronic insomnia.    Other insomnia  is the third type of insomnia-related sleep disorders. This description applies to people who have problems getting to sleep or staying asleep, but don't meet all of the factors that describe either short-term or chronic insomnia.    Many things cause insomnia. Different people may have different causes. It can be from an underlying medical or psychological condition, or lifestyle. It can also be primary insomnia, which means no cause can be found.  Causes of insomnia include:    Chronic medical problems- heart disease, gastrointestinal problems, hormonal changes, breathing problems    Anxiety    Stress    Depression    Pain    Work schedule    Sleep apnea    Illegal drugs    Certain medicines  Many different medicines can affect your sleep, such as stimulants, caffeine, alcohol, some decongestants, and diet pills. Other medicines may include some types of blood pressure pills, steroids,  asthma medicines, antihistamines, antidepressants, seizure medicines and statins. Not all of these will affect your sleep, and they shouldn t be stopped without talking to your doctor.  Symptoms of insomnia can include:    Lying awake for long periods at night before falling asleep    Waking up several times during the night    Waking up early in the morning and not being able to get back to sleep    Feeling tired and not refreshed by sleep    Not being able to function properly during the day and finding it hard to concentrate    Irritability    Tiredness and fatigue during the day  Home care    Review your medicines with your doctor or pharmacist to find out if they can cause insomnia. Not all medicines will affect your sleep, but they shouldn't be stopped without reviewing them with your doctor. There may be serious side effects and consequences from suddenly stopping your medicines. Not taking them may cause strokes, heart attacks, and many other problems.    Caffeine, smoking, and alcohol also affect sleep. Limit your daily use and don't use these before bedtime. Alcohol may make you sleepy at first, but as its effects wear off, you may awaken a few hours later and have trouble returning to sleep.    Don't exercise, eat or drink large amounts of liquid within 2 hours of your bedtime.    Improve your sleep habits. Have a fixed bed and wake-up time. Try to keep noise, light and heat in your bedroom at a comfortable level. Try using earplugs or eyeshades if needed.     Don't watch TV in bed.    If you don't fall asleep within 30 minutes, try to relax by reading or listening to soft music.    Limit daytime napping to one 30 minute period, early in the day.    Get regular exercise. Find other ways to lessen your stress level.    If a medicine was prescribed to help reset your sleep patterns, take it as directed. Sleeping pills are intended for short-term use, only. If taken for too long, the effect wears off while  "the risk of physical addiction and psychological dependence increases.  Sleep diary  If the cause isn t obvious and it is not improving, try keeping a  sleep diary  for a couple of weeks. Include in it:    The time you go to bed    How long it takes to fall asleep    How many times you wake up    What time you wake up    Your meal times and what you eat    What time you drink alcohol and caffeine    Your exercise habits and times  Follow-up care  Follow up with your healthcare provider, or as advised. If an X-ray or CT scan was done, you will be notified if there is a change in the reading, especially if it affects treatment.  Call 911  Call 911 if any of these occur:    Trouble breathing    Confusion or trouble waking    Fainting or loss of consciousness    Rapid heart rate    New chest, arm, shoulder, neck or upper back pain    Trouble with speech or vision, weakness of an arm or leg    Trouble walking or talking, loss of balance, numbness or weakness in one side of your body, facial droop  When to seek medical advice  Call your healthcare provider right away if any of these occur:    Extreme restlessness or irritability    Confusion or hallucinations (seeing or hearing things that are not there)    Anxiety, depression    Several days without sleeping  [employer] last reviewed this educational content on 5/1/2018 2000-2021 [employer]. All rights reserved. This information is not intended as a substitute for professional medical care. Always follow your healthcare professional's instructions.           Patient Education     Tips for Sleep Hygiene  \"Sleep hygiene\" means having good sleep habits.Follow these tips to sleep better at night:     Get on a schedule. Go to bed and get up at about the same time every day.    Listen to your body. Only try to sleep when you actually feel tired or sleepy.    Be patient. If you haven't been able to get to sleep after about 30 minutes or more, get up and do " "something calming or boring until you feel sleepy. Then return to bed and try again.    Don't have caffeine (coffee, tea, cola drinks, chocolate and some medicines), alcohol or nicotine (cigarettes). These can make it harder for you to fall asleep and stay asleep.    Use your bed for sleeping only. That means no TV, computer or homework in bed, especially during the evening and before bedtime.    Don't nap during the day. If you must nap, make sure it is for less than 20 minutes.    Create sleep rituals that remind your body it is time to sleep. Examples include breathing exercises, stretching or reading a book.    Avoid all electronic media (smart phone, computer, tablet) within 2 hours of bed time. The \"blue light\" in these devices activates the part of the brain that keeps you awake.    Dim the lights at night.    Get early morning sources of light (walk in the sunshine) to help set sleep patterns at night.    Try a bath or shower before bed. Having a warm bath 1 to 2 hours before bedtime can help you feel sleepy. Hot baths can make you alert, so be mindful of the temperature.    Don't watch the clock. Checking the clock during the night can wake you up. It can also lead to negative thoughts such as, \"I will never fall asleep,\" which can increase anxiety and sleeplessness.    Use a sleep diary. Track your sleep schedule to know your sleep patterns and to see where you can improve.    Get regular exercise every day. Try not to do heavy exercise in the 4 hours before bedtime.    Eat a healthy, balanced diet.    Try eating a light, healthy snack before bed, but avoid eating a heavy meal.    Create the right sleeping area. A cool, dark, quiet room is best. If needed, try earplugs, fans and blackout curtains.    Keep your daytime routine the same even if you have a bad night sleep. Avoiding activities the next day can make it harder to sleep.  For informational purposes only. Not to replace the advice of your health " care provider.   Copyright   2013 [facility]. All rights reserved. [employer] 488466 - 01/16.           Patient Education     Understanding Erectile Dysfunction    Erectile dysfunction (ED) is a problem getting an erection firm enough or keeping it long enough for intercourse. The problem can happen to any man at any age. But health problems that can lead to ED become more common as a man ages. Up to half of men over age 40 experience ED at some point.  Causes of ED  ED can have many causes. Most are physical. Some are emotional issues. Often, a combination of causes is involved. Causes of ED may include:    Health conditions such as diabetes, hardening of the arteries, high blood pressure, heart disease, stroke or depression    Smoking tobacco or marijuana    Drinking too much alcohol    Side effects from medicines    Injuries to nerves or blood vessels    Emotional issues, such as stress or relationship problems  ED can be treated  Prescription medicines for ED are available. These medicines often help. But, depending on the cause of the ED, medicines may not be enough. In these cases, other treatment options are available. These include erectile aids and surgery. Talk with your healthcare provider about the treatments that are available and pick the one that is right for you. New treatments for ED are being studied. No matter what treatment you decide on, stay in touch with your healthcare provider. If your symptoms persist, your healthcare provider may be able to adjust your current treatment or try something new.  [employer] last reviewed this educational content on 6/1/2019 2000-2021 [employer]. All rights reserved. This information is not intended as a substitute for professional medical care. Always follow your healthcare professional's instructions.

## 2021-10-14 ENCOUNTER — TELEPHONE (OUTPATIENT)
Dept: FAMILY MEDICINE | Facility: CLINIC | Age: 36
End: 2021-10-14
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
## 2021-10-29 ENCOUNTER — HOSPITAL ENCOUNTER (EMERGENCY)
Facility: CLINIC | Age: 36
Discharge: HOME | End: 2021-10-29
Attending: FAMILY MEDICINE | Admitting: FAMILY MEDICINE
Payer: COMMERCIAL

## 2021-10-29 VITALS
TEMPERATURE: 98.2 F | HEART RATE: 89 BPM | SYSTOLIC BLOOD PRESSURE: 165 MMHG | RESPIRATION RATE: 18 BRPM | DIASTOLIC BLOOD PRESSURE: 108 MMHG | OXYGEN SATURATION: 99 %

## 2021-10-29 DIAGNOSIS — J02.9 ACUTE PHARYNGITIS, UNSPECIFIED ETIOLOGY: ICD-10-CM

## 2021-10-29 LAB
DEPRECATED S PYO AG THROAT QL EIA: NEGATIVE
GROUP A STREP BY PCR: NOT DETECTED

## 2021-10-29 PROCEDURE — 87651 STREP A DNA AMP PROBE: CPT | Performed by: FAMILY MEDICINE

## 2021-10-29 PROCEDURE — 99284 EMERGENCY DEPT VISIT MOD MDM: CPT | Performed by: FAMILY MEDICINE

## 2021-10-29 PROCEDURE — 99283 EMERGENCY DEPT VISIT LOW MDM: CPT | Performed by: FAMILY MEDICINE

## 2021-10-29 PROCEDURE — 250N000012 HC RX MED GY IP 250 OP 636 PS 637: Performed by: FAMILY MEDICINE

## 2021-10-29 RX ORDER — OXYCODONE HYDROCHLORIDE 5 MG/1
5-10 TABLET ORAL EVERY 6 HOURS PRN
Qty: 10 TABLET | Refills: 0 | Status: SHIPPED | OUTPATIENT
Start: 2021-10-29 | End: 2021-11-20

## 2021-10-29 RX ORDER — OXYCODONE HYDROCHLORIDE 5 MG/1
5-10 TABLET ORAL EVERY 6 HOURS PRN
Qty: 12 TABLET | Refills: 0 | Status: SHIPPED | OUTPATIENT
Start: 2021-10-29 | End: 2021-11-20

## 2021-10-29 RX ADMIN — DEXAMETHASONE 10 MG: 2 TABLET ORAL at 06:52

## 2021-10-29 NOTE — RESULT ENCOUNTER NOTE
Group A Streptococcus PCR is NEGATIVE  No treatment or change in treatment [facility] ED lab result Strep Group A protocol.

## 2021-10-29 NOTE — ED PROVIDER NOTES
History     Chief Complaint   Patient presents with     Pharyngitis     Nasal Congestion     HPI  Germain Iraheta is a 35 year old male who comes in with a sore throat.  This is been present for 2 days.  This is associated with the slight cough and nasal congestion.  He has severe pain with swallowing.  He had trouble sleeping during the night because of a sense of airway closure.  He has not had fevers, headache, shortness of breath other than the difficulty breathing due to the throat swelling.    Allergies:  Allergies   Allergen Reactions     Amoxicillin Rash     Ceclor [Cefaclor Monohydrate] Rash     Erythromycin Rash     Lorabid [Loracarbef] Rash     Penicillins Rash       Problem List:    Patient Active Problem List    Diagnosis Date Noted     Essential hypertension 10/01/2021     Priority: Medium     Persistent disorder of initiating or maintaining sleep 10/01/2021     Priority: Medium     Bilateral leg cramps 10/01/2021     Priority: Medium     Nocturnal leg cramps 10/01/2021     Priority: Medium     Perforation of right tympanic membrane 11/20/2020     Priority: Medium     Added automatically from request for surgery [id number]       Conductive hearing loss of right ear with unrestricted hearing of left ear 11/20/2020     Priority: Medium     Added automatically from request for surgery [id number]       Impotence of organic origin 06/05/2013     Priority: Medium     CARDIOVASCULAR SCREENING; LDL GOAL LESS THAN 160 06/04/2013     Priority: Medium        Past Medical History:    Past Medical History:   Diagnosis Date     ED (erectile dysfunction)      Insomnia      RLS (restless legs syndrome)      Tobacco use disorder        Past Surgical History:    Past Surgical History:   Procedure Laterality Date     PE TUBES       TYMPANOPLASTY Right 1/18/2021    Procedure: RIGHT TYMPANOPLASTY WITH CARTILAGE BACKING;  Surgeon: Sandra Peña MD;  [place] [facility] [place]       Family History:    Family History   Problem Relation  Age of Onset     Diabetes Father      Cancer Maternal Grandfather         lung     Hypertension No family hx of        Social History:  Marital Status:  Single [1]  Social History     Tobacco Use     Smoking status: Current Every Day Smoker     Packs/day: 0.50     Years: 3.00     Pack years: 1.50     Types: Cigarettes     Smokeless tobacco: Never Used   Vaping Use     Vaping Use: Never used   Substance Use Topics     Alcohol use: Yes     Alcohol/week: 0.0 standard drinks     Comment: 5 drinks a month- weekend use     Drug use: Yes     Types: Marijuana     Comment: past use        Medications:    oxyCODONE (ROXICODONE) 5 MG tablet  amLODIPine (NORVASC) 2.5 MG tablet  chlorthalidone (HYGROTON) 25 MG tablet  gabapentin (NEURONTIN) 800 MG tablet  ibuprofen (ADVIL/MOTRIN) 200 MG tablet  naproxen (NAPROSYN) 500 MG tablet  tadalafil (CIALIS) 20 MG tablet  traZODone (DESYREL) 100 MG tablet          Review of Systems  All other systems are reviewed and are negative    Physical Exam   BP: (!) 165/108  Pulse: 89  Temp: 98.2  F (36.8  C)  Resp: 18  SpO2: 99 %      Physical Exam  Nursing note and vitals were reviewed.  Constitutional: Awake and alert, adequately nourished and developed appearing 35-year-old in moderate discomfort, who does appears moderately ill but nontoxic and who answers questions appropriately and cooperates with examination.  HEENT: Voice quality is normal.  Oropharynx shows erythema of the tonsils 2+ in size without exudate or asymmetry.  EOMI. PERRL  Neck: Freely mobile.  No meningismus.  There is bilateral anterior jugulodigastric adenopathy present.  Cardiovascular: Cardiac examination reveals normal heart rate and regular rhythm without murmur.  Pulmonary/Chest: Breathing is unlabored.  Breath sounds are clear and equal bilaterally.  There no retractions, tachypnea, rales, wheezes, or rhonchi.  Neurological: Alert, oriented, thought content logical, coherent   Psychiatric: Affect broad and  appropriate.      ED Course        Procedures              Critical Care time:  none               Results for orders placed or performed during the hospital encounter of 10/29/21 (from the past 24 hour(s))   Streptococcus A Rapid Scr w Reflx to PCR    Specimen: Throat; Swab   Result Value Ref Range    Group A Strep antigen Negative Negative       Medications   dexamethasone (DECADRON) tablet 10 mg (10 mg Oral Given 10/29/21 0652)       Assessments & Plan (with Medical Decision Making)   35-year-old presented with 2 days of sore throat.  He has tonsillitis on exam.  No evidence of peritonsillar abscess on exam.  No signs or symptoms to suggest a more worrisome cause for tonsillitis.  Strep testing negative.  This is most likely viral.  Symptomatic care was outlined.  Signs and symptoms that should prompt return were reviewed including inability to control the pain, asymmetric pain, inability to swallow, or other worrisome symptoms.  I have reviewed the nursing notes.    I have reviewed the findings, diagnosis, plan and need for follow up with the patient.       New Prescriptions    OXYCODONE (ROXICODONE) 5 MG TABLET    Take 1-2 tablets (5-10 mg) by mouth every 6 hours as needed for severe pain       Final diagnoses:   Acute pharyngitis, unspecified etiology       10/29/2021   [facility] EMERGENCY DEPT     Marin Choe MD  10/29/21 1740

## 2021-10-29 NOTE — DISCHARGE INSTRUCTIONS
You have tonsillitis due to a virus infection.  There is no specific treatment for this.  You can treat symptomatically as below.  Use ibuprofen 400 mg up to 4 times per day if needed for pain. Stop if it is causing nausea or abdominal pain.   Add acetaminophen 1000 mg 1-2 pills up to 4 times per day if needed for pain.   You may add oxycodone 5 mg, 1-2 tablets up to every 6 hours if needed for pain.  Try to use this primarily only at night to help with sleep.    Do not use alcohol, operate machinery, drive, or climb on ladders for 8 hours after taking oxycodone. Use docusate (100mg) 2 times a day to prevent constipation while on narcotics.  Symptoms could progress to more cough and congestion as is typical with viruses.  If they have not resolved in 7 to 10 days, or if you develop high fevers, inability to swallow, or other worrisome symptoms, return to the emergency department for reevaluation.

## 2021-11-01 ENCOUNTER — PATIENT OUTREACH (OUTPATIENT)
Dept: FAMILY MEDICINE | Facility: CLINIC | Age: 36
End: 2021-11-01

## 2021-11-01 NOTE — TELEPHONE ENCOUNTER
ED for acute condition Discharge Protocol    ED / Discharge Outreach Protocol    Patient Contact    Attempt # 1    Was call answered?  No.  Left message on voicemail with information to call me back.  Anisha Foley RN

## 2021-11-10 ENCOUNTER — HOSPITAL ENCOUNTER (EMERGENCY)
Facility: CLINIC | Age: 36
Discharge: LEFT BEFORE DISCHARGE | End: 2021-11-10
Admitting: FAMILY MEDICINE
Payer: COMMERCIAL

## 2021-11-10 VITALS
SYSTOLIC BLOOD PRESSURE: 145 MMHG | DIASTOLIC BLOOD PRESSURE: 97 MMHG | WEIGHT: 195 LBS | BODY MASS INDEX: 27.3 KG/M2 | OXYGEN SATURATION: 97 % | HEART RATE: 89 BPM | RESPIRATION RATE: 18 BRPM | TEMPERATURE: 97.9 F | HEIGHT: 71 IN

## 2021-11-10 DIAGNOSIS — J03.90 ACUTE TONSILLITIS, UNSPECIFIED ETIOLOGY: ICD-10-CM

## 2021-11-10 PROCEDURE — 99282 EMERGENCY DEPT VISIT SF MDM: CPT | Performed by: FAMILY MEDICINE

## 2021-11-10 ASSESSMENT — MIFFLIN-ST. JEOR: SCORE: 1831.64

## 2021-11-10 NOTE — ED PROVIDER NOTES
History     Chief Complaint   Patient presents with     Pharyngitis     HPI  Germain Iraheta is a 35 year old male who presented with history of hypertension, insomnia, multiple episodes in the last year of emergency department visits.  Seizure disorder and amphetamine positive on urine drug screens, evaluations for seizures..  Seen on October 29 approximately 11 days ago when he had pharyngitis.  This has been present for a couple of days at the time slight cough nasal congestion.  He felt that he was having difficulty with his breathing due to his tonsils.  His strep test was negative.  There is no sign of peritonsillar abscess.  He was given 1 dose of 10 mg Decadron.  He had negative strep testing.  He also was prescribed oxycodone at the time.    He returns today as he was better for a short period and then worse again.  Describes a fullness in his neck consistent with adenopathy on the right side.  He has no fever now.  He is able to's eat and drink.  Overnight he has the persistent sensation of shortness of breath.  No stridor or wheezing.  He does have some nasal congestion as well.    Allergies:  Allergies   Allergen Reactions     Amoxicillin Rash     Ceclor [Cefaclor Monohydrate] Rash     Erythromycin Rash     Lorabid [Loracarbef] Rash     Penicillins Rash       Problem List:    Patient Active Problem List    Diagnosis Date Noted     Essential hypertension 10/01/2021     Priority: Medium     Persistent disorder of initiating or maintaining sleep 10/01/2021     Priority: Medium     Bilateral leg cramps 10/01/2021     Priority: Medium     Nocturnal leg cramps 10/01/2021     Priority: Medium     Perforation of right tympanic membrane 11/20/2020     Priority: Medium     Added automatically from request for surgery [id number]       Conductive hearing loss of right ear with unrestricted hearing of left ear 11/20/2020     Priority: Medium     Added automatically from request for surgery [id number]       Impotence of  "organic origin 06/05/2013     Priority: Medium     CARDIOVASCULAR SCREENING; LDL GOAL LESS THAN 160 06/04/2013     Priority: Medium        Past Medical History:    Past Medical History:   Diagnosis Date     ED (erectile dysfunction)      Insomnia      RLS (restless legs syndrome)      Tobacco use disorder        Past Surgical History:    Past Surgical History:   Procedure Laterality Date     PE TUBES       TYMPANOPLASTY Right 1/18/2021    Procedure: RIGHT TYMPANOPLASTY WITH CARTILAGE BACKING;  Surgeon: Sandra Peña MD;  [place] [facility] [place]       Family History:    Family History   Problem Relation Age of Onset     Diabetes Father      Cancer Maternal Grandfather         lung     Hypertension No family hx of        Social History:  Marital Status:  Single [1]  Social History     Tobacco Use     Smoking status: Current Every Day Smoker     Packs/day: 0.50     Years: 3.00     Pack years: 1.50     Types: Cigarettes     Smokeless tobacco: Never Used   Vaping Use     Vaping Use: Never used   Substance Use Topics     Alcohol use: Yes     Alcohol/week: 0.0 standard drinks     Comment: 5 drinks a month- weekend use     Drug use: Yes     Types: Marijuana     Comment: past use        Medications:    amLODIPine (NORVASC) 2.5 MG tablet  chlorthalidone (HYGROTON) 25 MG tablet  gabapentin (NEURONTIN) 800 MG tablet  ibuprofen (ADVIL/MOTRIN) 200 MG tablet  naproxen (NAPROSYN) 500 MG tablet  oxyCODONE (ROXICODONE) 5 MG tablet  oxyCODONE (ROXICODONE) 5 MG tablet  oxyCODONE (ROXICODONE) 5 MG tablet  oxyCODONE (ROXICODONE) 5 MG tablet  tadalafil (CIALIS) 20 MG tablet  traZODone (DESYREL) 100 MG tablet          Review of Systems  ROS:  5 point ROS negative except as noted above in HPI, including Gen., Resp., CV, GI &  system review.    Physical Exam   BP: (!) 145/97  Pulse: 89  Temp: 97.9  F (36.6  C)  Resp: 18  Height: 180.3 cm (5' 11\")  Weight: 88.5 kg (195 lb)  SpO2: 97 %      Physical Exam  Constitutional:       General: He is not " in acute distress.     Appearance: He is not diaphoretic.   HENT:      Right Ear: No drainage.      Left Ear: No drainage.      Nose: No rhinorrhea.      Mouth/Throat:      Mouth: Mucous membranes are moist. No oral lesions.      Pharynx: No pharyngeal swelling, oropharyngeal exudate, posterior oropharyngeal erythema or uvula swelling.      Tonsils: No tonsillar exudate or tonsillar abscesses. 2+ on the right. 2+ on the left.   Cardiovascular:      Rate and Rhythm: Normal rate and regular rhythm.      Heart sounds: Normal heart sounds.   Pulmonary:      Effort: No respiratory distress.      Breath sounds: No stridor. No wheezing, rhonchi or rales.   Lymphadenopathy:      Cervical: Cervical adenopathy (right side 3 cm node) present.   Skin:     Findings: No rash.   Neurological:      Mental Status: He is alert.            ED Course        Procedures              Critical Care time:  none               No results found for this or any previous visit (from the past 24 hour(s)).    Medications - No data to display    Assessments & Plan (with Medical Decision Making)     MDM: Germain Iraheta is a 35 year old male who presents with persistent pharyngitis and more difficult sleeping at nighttime.  Some associated fatigue as well with this.  Prior mono as a child.  No stridor or wheezing reassuring examination.  No signs of peritonsillar abscess on examination.  Adenopathy that is nontender right side.  No obvious masses palpated.  He has a longstanding difficulty with his breathing he tells me when he lies supine.  This is not just present now but he notices it more currently.  We discussed obtaining strep test, mono test, CBC.  I offered that he undergo CT of the soft tissue neck to exclude mass, deep space abscess.  He is afebrile nontoxic and no signs of stridor on exam.  No suspicion for epiglottitis.  My suspicion is relatively low but I explained that this would exclude underlying cause for some of the symptoms.  He  declines this at this time.  After I spoken with the patient placed the order from the triage area and discussed with nursing who saw him almost immediately after I completed my evaluation with him, he had already left the waiting area.  He had requested repeat dosing of Decadron.  I explained that I would not recommend the continuation of corticosteroids that were used 10 days ago that there are increased risks for immunity as well as osteoporosis with chronic use.  I do not currently see an indication for it.  We discussed pacifically my recommendations of using nasal saline Afrin, undergoing the testing I mentioned other than the CT which he declines.  Discussed seeing his primary provider who he has an appointment with tomorrow for recheck.  Discussed considering sleep apnea given his long term difficulties with supine position.  We also discussed precautions for return.    I have reviewed the nursing notes.    I have reviewed the findings, diagnosis, plan and need for follow up with the patient.       New Prescriptions    No medications on file       Final diagnoses:   Acute tonsillitis, unspecified etiology - No serious findings on exam.  I am suspicious of mono.  We discussed CT neck to exclude deep space abscess due to difficult breathing at night and this was declined.  It sounds as if this is chronic.  I recommend discussing possible sleep apnea with your primary doctor who you are seeing tomorrow.  Also, the nasal congestion that is likely related to sinus drainage, possible blockage from sinuses, use nasal saline and may consider afrin for 3 days.  follow-up recheck tomorrow. return for fever       11/10/2021   [facility] EMERGENCY DEPT     Anant Gardner MD  11/10/21 [id number]

## 2021-11-10 NOTE — ED TRIAGE NOTES
Pt here with tonsillitis/ phalangitis at least a week. There was some improvement and now it hurts again and feels worse. Difficult to breathe.

## 2021-11-20 ENCOUNTER — HOSPITAL ENCOUNTER (EMERGENCY)
Facility: CLINIC | Age: 36
Discharge: HOME | End: 2021-11-20
Attending: EMERGENCY MEDICINE | Admitting: EMERGENCY MEDICINE
Payer: COMMERCIAL

## 2021-11-20 ENCOUNTER — APPOINTMENT (OUTPATIENT)
Dept: CT IMAGING | Facility: CLINIC | Age: 36
End: 2021-11-20
Attending: EMERGENCY MEDICINE
Payer: COMMERCIAL

## 2021-11-20 VITALS
WEIGHT: 200 LBS | SYSTOLIC BLOOD PRESSURE: 132 MMHG | BODY MASS INDEX: 28 KG/M2 | HEIGHT: 71 IN | TEMPERATURE: 99.7 F | OXYGEN SATURATION: 96 % | DIASTOLIC BLOOD PRESSURE: 80 MMHG | HEART RATE: 109 BPM | RESPIRATION RATE: 16 BRPM

## 2021-11-20 DIAGNOSIS — J02.9 ACUTE PHARYNGITIS, UNSPECIFIED ETIOLOGY: ICD-10-CM

## 2021-11-20 DIAGNOSIS — S06.9X0A MILD TRAUMATIC BRAIN INJURY, WITHOUT LOSS OF CONSCIOUSNESS, INITIAL ENCOUNTER (H): ICD-10-CM

## 2021-11-20 DIAGNOSIS — J06.9 VIRAL URI WITH COUGH: ICD-10-CM

## 2021-11-20 LAB
FLUAV RNA SPEC QL NAA+PROBE: NEGATIVE
FLUBV RNA RESP QL NAA+PROBE: NEGATIVE
SARS-COV-2 RNA RESP QL NAA+PROBE: NEGATIVE

## 2021-11-20 PROCEDURE — 87636 SARSCOV2 & INF A&B AMP PRB: CPT | Performed by: EMERGENCY MEDICINE

## 2021-11-20 PROCEDURE — C9803 HOPD COVID-19 SPEC COLLECT: HCPCS | Performed by: EMERGENCY MEDICINE

## 2021-11-20 PROCEDURE — 70450 CT HEAD/BRAIN W/O DYE: CPT

## 2021-11-20 PROCEDURE — 99285 EMERGENCY DEPT VISIT HI MDM: CPT | Mod: 25 | Performed by: EMERGENCY MEDICINE

## 2021-11-20 PROCEDURE — 99284 EMERGENCY DEPT VISIT MOD MDM: CPT | Performed by: EMERGENCY MEDICINE

## 2021-11-20 PROCEDURE — 250N000013 HC RX MED GY IP 250 OP 250 PS 637: Performed by: EMERGENCY MEDICINE

## 2021-11-20 RX ORDER — ACETAMINOPHEN 500 MG
1000 TABLET ORAL ONCE
Status: COMPLETED | OUTPATIENT
Start: 2021-11-20 | End: 2021-11-20

## 2021-11-20 RX ORDER — OXYCODONE HYDROCHLORIDE 5 MG/1
5 TABLET ORAL EVERY 6 HOURS PRN
Qty: 6 TABLET | Refills: 0 | Status: SHIPPED | OUTPATIENT
Start: 2021-11-20 | End: 2021-12-03

## 2021-11-20 RX ADMIN — ACETAMINOPHEN 1000 MG: 500 TABLET, FILM COATED ORAL at 01:50

## 2021-11-20 ASSESSMENT — MIFFLIN-ST. JEOR: SCORE: 1854.32
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
## 2021-11-22 ENCOUNTER — HOSPITAL ENCOUNTER (EMERGENCY)
Facility: CLINIC | Age: 36
Discharge: HOME | End: 2021-11-22
Payer: COMMERCIAL

## 2021-11-22 VITALS
RESPIRATION RATE: 16 BRPM | DIASTOLIC BLOOD PRESSURE: 74 MMHG | HEART RATE: 113 BPM | TEMPERATURE: 98.9 F | OXYGEN SATURATION: 95 % | SYSTOLIC BLOOD PRESSURE: 134 MMHG

## 2021-11-23 ENCOUNTER — TELEPHONE (OUTPATIENT)
Dept: FAMILY MEDICINE | Facility: CLINIC | Age: 36
End: 2021-11-23
Payer: COMMERCIAL

## 2021-11-23 VITALS
TEMPERATURE: 97.9 F | RESPIRATION RATE: 16 BRPM | OXYGEN SATURATION: 99 % | DIASTOLIC BLOOD PRESSURE: 112 MMHG | HEART RATE: 101 BPM | WEIGHT: 200 LBS | BODY MASS INDEX: 27.89 KG/M2 | SYSTOLIC BLOOD PRESSURE: 158 MMHG

## 2021-11-23 PROCEDURE — 99284 EMERGENCY DEPT VISIT MOD MDM: CPT | Performed by: EMERGENCY MEDICINE

## 2021-11-23 NOTE — TELEPHONE ENCOUNTER
Pt has swollen tonsils that are almost touching each other in the center.  Pencil size opening, pt having difficulty breathing.  No openings in clinic and emergency situation.  Referred to ER and parent/pt agreed.  KPavelRN

## 2021-11-24 ENCOUNTER — HOSPITAL ENCOUNTER (EMERGENCY)
Facility: CLINIC | Age: 36
Discharge: HOME | End: 2021-11-24
Attending: EMERGENCY MEDICINE | Admitting: EMERGENCY MEDICINE
Payer: COMMERCIAL

## 2021-11-24 DIAGNOSIS — J02.9 VIRAL PHARYNGITIS: ICD-10-CM

## 2021-11-24 LAB
DEPRECATED S PYO AG THROAT QL EIA: NEGATIVE
GROUP A STREP BY PCR: NOT DETECTED

## 2021-11-24 PROCEDURE — 87651 STREP A DNA AMP PROBE: CPT | Performed by: EMERGENCY MEDICINE

## 2021-11-24 PROCEDURE — 250N000009 HC RX 250: Performed by: EMERGENCY MEDICINE

## 2021-11-24 RX ORDER — DEXAMETHASONE SODIUM PHOSPHATE 4 MG/ML
10 VIAL (ML) INJECTION ONCE
Status: COMPLETED | OUTPATIENT
Start: 2021-11-24 | End: 2021-11-24

## 2021-11-24 RX ADMIN — DEXAMETHASONE SODIUM PHOSPHATE 10 MG: 4 INJECTION, SOLUTION INTRAMUSCULAR; INTRAVENOUS at 01:50

## 2021-11-24 NOTE — DISCHARGE INSTRUCTIONS
Return if symptoms worsen or new symptoms develop.  Follow-up with primary care physician next available.  Drink plenty of fluids.  Take ibuprofen or Tylenol for pain.  Gargle with salt water as needed.  Follow-up with ear nose and throat doctor next available.  Strep screen was negative.  We will call you with culture results.  No evidence of abscess is present.   Statement Selected

## 2021-11-28 NOTE — ED PROVIDER NOTES
History     Chief Complaint   Patient presents with     Pharyngitis     HPI  Germain Iraheta is a 35 year old male who presents to the emergency department complaining of sore throat.  Patient has been having difficulty with a sore throat.  Several months and has been treated with antibiotics and steroids without improvement.  He feels like his neck is swelling and he thinks he has had a low-grade fever.  Denies any headache ear pain chest pain cough shortness of breath abdominal pain nausea vomiting diarrhea or other symptoms.  Patient states he can swallow liquids easily but has some pain with swallowing food.  He has not seen an ENT doctor for this.    Allergies:  Allergies   Allergen Reactions     Ceclor [Cefaclor Monohydrate] Rash     Erythromycin Rash     Lorabid [Loracarbef] Rash       Problem List:    Patient Active Problem List    Diagnosis Date Noted     Essential hypertension 10/01/2021     Priority: Medium     Persistent disorder of initiating or maintaining sleep 10/01/2021     Priority: Medium     Bilateral leg cramps 10/01/2021     Priority: Medium     Nocturnal leg cramps 10/01/2021     Priority: Medium     Perforation of right tympanic membrane 11/20/2020     Priority: Medium     Added automatically from request for surgery [id number]       Conductive hearing loss of right ear with unrestricted hearing of left ear 11/20/2020     Priority: Medium     Added automatically from request for surgery [id number]       Impotence of organic origin 06/05/2013     Priority: Medium     CARDIOVASCULAR SCREENING; LDL GOAL LESS THAN 160 06/04/2013     Priority: Medium        Past Medical History:    Past Medical History:   Diagnosis Date     ED (erectile dysfunction)      Insomnia      RLS (restless legs syndrome)      Tobacco use disorder        Past Surgical History:    Past Surgical History:   Procedure Laterality Date     PE TUBES       TYMPANOPLASTY Right 1/18/2021    Procedure: RIGHT TYMPANOPLASTY WITH CARTILAGE  BACKING;  Surgeon: Sandra Peña MD;  [place] [facility] [place]       Family History:    Family History   Problem Relation Age of Onset     Diabetes Father      Cancer Maternal Grandfather         lung     Hypertension No family hx of        Social History:  Marital Status:  Single [1]  Social History     Tobacco Use     Smoking status: Current Every Day Smoker     Packs/day: 0.50     Years: 3.00     Pack years: 1.50     Types: Cigarettes     Smokeless tobacco: Never Used   Vaping Use     Vaping Use: Never used   Substance Use Topics     Alcohol use: Yes     Alcohol/week: 0.0 standard drinks     Comment: 5 drinks a month- weekend use     Drug use: Yes     Types: Marijuana     Comment: past use        Medications:    amLODIPine (NORVASC) 2.5 MG tablet  chlorthalidone (HYGROTON) 25 MG tablet  gabapentin (NEURONTIN) 800 MG tablet  ibuprofen (ADVIL/MOTRIN) 200 MG tablet  naproxen (NAPROSYN) 500 MG tablet  oxyCODONE (ROXICODONE) 5 MG tablet  tadalafil (CIALIS) 20 MG tablet  traZODone (DESYREL) 100 MG tablet          Review of Systems  As per HPI.  Physical Exam   BP: (!) 158/112  Pulse: 101  Temp: 97.9  F (36.6  C)  Resp: 16  Weight: 90.7 kg (200 lb)  SpO2: 99 %      Physical Exam  Vitals and nursing note reviewed.   Constitutional:       General: He is not in acute distress.     Appearance: He is well-developed. He is not ill-appearing, toxic-appearing or diaphoretic.   HENT:      Head: Normocephalic and atraumatic.      Right Ear: Tympanic membrane and ear canal normal.      Left Ear: Tympanic membrane and ear canal normal.      Nose: No congestion or rhinorrhea.      Mouth/Throat:      Mouth: No oral lesions.      Pharynx: No uvula swelling.      Comments: Oral mucosa is moist.  There are no lesions.  Posterior pharynx with enlarged tonsils symmetrical bilaterally +2-3 in nature.  No exudate is present mild erythema no significant edema or surrounding tissues no evidence of abscess.  Eyes:      Conjunctiva/sclera:  Conjunctivae normal.   Neck:      Comments: Mild bilateral anterior cervical adenopathy symmetrical in nature.  No evidence of significant swelling erythema or abscess/mass  Cardiovascular:      Rate and Rhythm: Regular rhythm.      Pulses: Normal pulses.      Heart sounds: Normal heart sounds. No murmur heard.      Pulmonary:      Effort: Pulmonary effort is normal. No respiratory distress.      Breath sounds: Normal breath sounds. No stridor. No wheezing or rhonchi.   Abdominal:      General: Abdomen is flat.      Palpations: Abdomen is soft.      Tenderness: There is no abdominal tenderness.   Musculoskeletal:         General: No tenderness. Normal range of motion.      Right lower leg: No edema.      Left lower leg: No edema.   Skin:     General: Skin is warm and dry.      Findings: No rash.   Neurological:      Mental Status: He is alert. Mental status is at baseline.      Motor: No weakness.   Psychiatric:         Mood and Affect: Mood normal.         ED Course        Procedures              Critical Care time:  none               No results found for this or any previous visit (from the past 24 hour(s)).    Medications   dexamethasone (DECADRON) injectable solution used ORALLY 10 mg (10 mg Oral Given 11/24/21 0150)       Assessments & Plan (with Medical Decision Making) records were reviewed.  Patient has been seen several times for same complaint.  Strep screen was obtained and patient was given Decadron.  Strep screen was negative.  I discussed in detail with patient that there is no evidence of abscess or swelling in the neck.  He does have large tonsils.  I feel it would be best to have him follow-up with ENT for further evaluation and care he appears in no distress at this time.  I do not think imaging of his neck will show anything significant and he was in agreement with this at this time.  He will continue ibuprofen and Tylenol for pain and symptomatic treatment for sore throat if worsening symptoms  he will return for further evaluation and care and will follow up with ENT.     I have reviewed the nursing notes.    I have reviewed the findings, diagnosis, plan and need for follow up with the patient.       Discharge Medication List as of 11/24/2021  2:06 AM          Final diagnoses:   Viral pharyngitis       11/23/2021   [facility] EMERGENCY DEPT     Ervin Urbina MD  11/28/21 0949

## 2021-12-03 ENCOUNTER — OFFICE VISIT (OUTPATIENT)
Dept: FAMILY MEDICINE | Facility: CLINIC | Age: 36
End: 2021-12-03
Payer: COMMERCIAL

## 2021-12-03 ENCOUNTER — MYC MEDICAL ADVICE (OUTPATIENT)
Dept: FAMILY MEDICINE | Facility: CLINIC | Age: 36
End: 2021-12-03

## 2021-12-03 VITALS
TEMPERATURE: 98.9 F | HEIGHT: 71 IN | WEIGHT: 208 LBS | OXYGEN SATURATION: 98 % | HEART RATE: 85 BPM | RESPIRATION RATE: 16 BRPM | DIASTOLIC BLOOD PRESSURE: 80 MMHG | BODY MASS INDEX: 29.12 KG/M2 | SYSTOLIC BLOOD PRESSURE: 140 MMHG

## 2021-12-03 DIAGNOSIS — J35.01 CHRONIC TONSILLITIS: Primary | ICD-10-CM

## 2021-12-03 DIAGNOSIS — R05.9 COUGH: ICD-10-CM

## 2021-12-03 DIAGNOSIS — R59.1 LYMPHADENOPATHY: ICD-10-CM

## 2021-12-03 LAB
BASOPHILS # BLD MANUAL: 0.1 10E3/UL (ref 0–0.2)
BASOPHILS NFR BLD MANUAL: 1 %
CRP SERPL-MCNC: 12.2 MG/L (ref 0–8)
EOSINOPHIL # BLD MANUAL: 0.2 10E3/UL (ref 0–0.7)
EOSINOPHIL NFR BLD MANUAL: 2 %
ERYTHROCYTE [DISTWIDTH] IN BLOOD BY AUTOMATED COUNT: 13.6 % (ref 10–15)
ERYTHROCYTE [SEDIMENTATION RATE] IN BLOOD BY WESTERGREN METHOD: 51 MM/HR (ref 0–15)
HCT VFR BLD AUTO: 42.1 % (ref 40–53)
HGB BLD-MCNC: 13.8 G/DL (ref 13.3–17.7)
LYMPHOCYTES # BLD MANUAL: 7.3 10E3/UL (ref 0.8–5.3)
LYMPHOCYTES NFR BLD MANUAL: 70 %
MCH RBC QN AUTO: 29.3 PG (ref 26.5–33)
MCHC RBC AUTO-ENTMCNC: 32.8 G/DL (ref 31.5–36.5)
MCV RBC AUTO: 89 FL (ref 78–100)
MONOCYTES # BLD MANUAL: 0.6 10E3/UL (ref 0–1.3)
MONOCYTES NFR BLD MANUAL: 6 %
NEUTROPHILS # BLD MANUAL: 2.2 10E3/UL (ref 1.6–8.3)
NEUTROPHILS NFR BLD MANUAL: 21 %
PLAT MORPH BLD: ABNORMAL
PLATELET # BLD AUTO: 293 10E3/UL (ref 150–450)
RBC # BLD AUTO: 4.71 10E6/UL (ref 4.4–5.9)
RBC MORPH BLD: ABNORMAL
VARIANT LYMPHS BLD QL SMEAR: PRESENT
WBC # BLD AUTO: 10.4 10E3/UL (ref 4–11)

## 2021-12-03 PROCEDURE — 85652 RBC SED RATE AUTOMATED: CPT | Performed by: NURSE PRACTITIONER

## 2021-12-03 PROCEDURE — 86140 C-REACTIVE PROTEIN: CPT | Performed by: NURSE PRACTITIONER

## 2021-12-03 PROCEDURE — U0005 INFEC AGEN DETEC AMPLI PROBE: HCPCS | Performed by: NURSE PRACTITIONER

## 2021-12-03 PROCEDURE — 85027 COMPLETE CBC AUTOMATED: CPT | Performed by: NURSE PRACTITIONER

## 2021-12-03 PROCEDURE — 99214 OFFICE O/P EST MOD 30 MIN: CPT | Performed by: NURSE PRACTITIONER

## 2021-12-03 PROCEDURE — U0003 INFECTIOUS AGENT DETECTION BY NUCLEIC ACID (DNA OR RNA); SEVERE ACUTE RESPIRATORY SYNDROME CORONAVIRUS 2 (SARS-COV-2) (CORONAVIRUS DISEASE [COVID-19]), AMPLIFIED PROBE TECHNIQUE, MAKING USE OF HIGH THROUGHPUT TECHNOLOGIES AS DESCRIBED BY CMS-2020-01-R: HCPCS | Performed by: NURSE PRACTITIONER

## 2021-12-03 PROCEDURE — 36415 COLL VENOUS BLD VENIPUNCTURE: CPT | Performed by: NURSE PRACTITIONER

## 2021-12-03 RX ORDER — ALBUTEROL SULFATE 90 UG/1
2 AEROSOL, METERED RESPIRATORY (INHALATION) EVERY 4 HOURS PRN
Qty: 18 G | Refills: 1 | Status: SHIPPED | OUTPATIENT
Start: 2021-12-03

## 2021-12-03 ASSESSMENT — MIFFLIN-ST. JEOR: SCORE: 1890.61

## 2021-12-03 NOTE — PATIENT INSTRUCTIONS
1.  Make appointment for CT.  2.  Labs today.  I will call you with results.  3.  Take antibiotic as directed.  4.  Follow-up in 1 week for recheck.  5.  COVID testing completed and will notify you of results on MyChart.  6.  Make an appointment with ENT for evaluation, you can always cancel if needed.

## 2021-12-03 NOTE — PROGRESS NOTES
Assessment & Plan     Chronic tonsillitis  Due to chronicity and persistent red inflammed large tonsils and lymphadenopathy, will treat with antibiotics for 7 days with close follow-up in clinic.  I have ordered CBC and inflammatory markers for further evaluation.  I also ordered CT of the soft tissue neck for rule out cancer/abcess or other cause for symptoms.  If no improvement and CT is normal, referral to ENT would be advised.  - CBC with platelets and differential; Future  - amoxicillin-clavulanate (AUGMENTIN) 875-125 MG tablet; Take 1 tablet by mouth 2 times daily for 7 days  - CRP, inflammation; Future  - ESR: Erythrocyte sedimentation rate; Future  - CT Soft Tissue Neck w Contrast; Future  - CBC with platelets and differential  - CRP, inflammation  - ESR: Erythrocyte sedimentation rate    Cough  Due to new symptom of cough ordered COVID 19 for rule out.  Ordered albuterol due to wheezing to help open up his airways for reduction in cough.  - Symptomatic COVID-19 Virus (Coronavirus) by PCR; Future  - albuterol (PROAIR HFA/PROVENTIL HFA/VENTOLIN HFA) 108 (90 Base) MCG/ACT inhaler; Inhale 2 puffs into the lungs every 4 hours as needed for shortness of breath / dyspnea or wheezing (cough)  - Symptomatic COVID-19 Virus (Coronavirus) by PCR Nose    Lymphadenopathy  - CT Soft Tissue Neck w Contrast; Future    See Patient Instructions    Return in about 1 week (around 12/10/2021) for Follow up, in person.    Noreen Turner NP  [facility]    Rosa Isela Paulson is a 35 year old who presents for the following health issues;     [facility]     ED/UC Followup:    Facility:  [facility]  Date of visit: 10/29/2021. 11/10/2021, 11/20/2021, 11/24/2021  Reason for visit: Acute pharyngitis, acute tonsillitis, viral pharyngitis, viral URI with cough  * 10/29 NEG - Strep, 11/20 NEG - Flu, 11/24 NEG - strep   Current Status: throat has not been hurting as bad as it has been, but feels like  "it is going to his chest.  Using Nyquil in the evening to sleep. Feels as if he has a temp at times. Also gets the chills.    Symptoms have been waxing and waning since 10/29/21.  In the last week symptoms started worsening again.  He does feel some improvement in the pain but tonsils are still red, sore and enlarged and his lymph nodes are all swollen under the mandible bilaterally and cervically there is tenderness and swelling as well.  Patient has been using cold/flu and this has helped the cough taht started last night.  He has had Chills and sweats but no temp the last couple days.    Review of Systems   CONSTITUTIONAL:POSITIVE  for chills and sweats  ENT/MOUTH: POSITIVE for sore throat and swollen glands  RESP:POSITIVE for cough-non productive and wheezing  CV: NEGATIVE for chest pain, palpitations or peripheral edema  PSYCHIATRIC: NEGATIVE for changes in mood or affect  ROS otherwise negative      Objective    BP (!) 140/80   Pulse 85   Temp 98.9  F (37.2  C) (Tympanic)   Resp 16   Ht 1.803 m (5' 11\")   Wt 94.3 kg (208 lb)   SpO2 98%   BMI 29.01 kg/m    Body mass index is 29.01 kg/m .  Physical Exam   GENERAL: healthy, alert and no distress  EYES: Eyes grossly normal to inspection, PERRL and conjunctivae and sclerae normal  HENT: normal cephalic/atraumatic, ear canals and TM's normal, nose and mouth without ulcers or lesions, oropharynx clear, oral mucous membranes moist, tonsillar hypertrophy and tonsillar erythema  NECK: bilateral anterior cervical adenopathy  RESP: lungs clear to auscultation - there is some wheezing with the cough  CV: regular rate and rhythm, normal S1 S2, no S3 or S4, no murmur, click or rub, no peripheral edema and peripheral pulses strong  NEURO: Normal strength and tone, mentation intact and speech normal  PSYCH: mentation appears normal, affect normal/bright  LYMPH: anterior cervical: enlarged tender nodes;  Submandibular lymph node enlargement and tenderness also, no " redness seen

## 2021-12-04 LAB — SARS-COV-2 RNA RESP QL NAA+PROBE: NEGATIVE

## 2021-12-08 ENCOUNTER — HOSPITAL ENCOUNTER (OUTPATIENT)
Dept: CT IMAGING | Facility: CLINIC | Age: 36
Discharge: HOME | End: 2021-12-08
Attending: NURSE PRACTITIONER | Admitting: NURSE PRACTITIONER
Payer: COMMERCIAL

## 2021-12-08 DIAGNOSIS — R59.1 LYMPHADENOPATHY: ICD-10-CM

## 2021-12-08 DIAGNOSIS — J35.01 CHRONIC TONSILLITIS: ICD-10-CM

## 2021-12-08 PROCEDURE — 250N000011 HC RX IP 250 OP 636: Performed by: NURSE PRACTITIONER

## 2021-12-08 PROCEDURE — 70491 CT SOFT TISSUE NECK W/DYE: CPT

## 2021-12-08 PROCEDURE — 250N000009 HC RX 250: Performed by: NURSE PRACTITIONER

## 2021-12-08 RX ORDER — IOPAMIDOL 755 MG/ML
80 INJECTION, SOLUTION INTRAVASCULAR ONCE
Status: COMPLETED | OUTPATIENT
Start: 2021-12-08 | End: 2021-12-08

## 2021-12-08 RX ADMIN — IOPAMIDOL 80 ML: 755 INJECTION, SOLUTION INTRAVENOUS at 14:47

## 2021-12-08 RX ADMIN — SODIUM CHLORIDE 80 ML: 9 INJECTION, SOLUTION INTRAVENOUS at 14:48

## 2022-01-11 RX ORDER — GABAPENTIN 600 MG/1
TABLET ORAL
Qty: 90 TABLET | Refills: 1 | OUTPATIENT
Start: 2022-01-11

## 2022-01-13 ENCOUNTER — TELEPHONE (OUTPATIENT)
Dept: BEHAVIORAL HEALTH | Facility: CLINIC | Age: 37
End: 2022-01-13
Payer: COMMERCIAL

## 2022-01-17 ENCOUNTER — TELEPHONE (OUTPATIENT)
Dept: BEHAVIORAL HEALTH | Facility: CLINIC | Age: 37
End: 2022-01-17
Payer: COMMERCIAL

## 2022-01-17 ENCOUNTER — HOSPITAL ENCOUNTER (OUTPATIENT)
Dept: BEHAVIORAL HEALTH | Facility: CLINIC | Age: 37
Discharge: HOME | End: 2022-01-17
Attending: FAMILY MEDICINE | Admitting: FAMILY MEDICINE
Payer: COMMERCIAL

## 2022-01-17 PROCEDURE — 90791 PSYCH DIAGNOSTIC EVALUATION: CPT | Mod: GT,95 | Performed by: COUNSELOR

## 2022-01-17 ASSESSMENT — ANXIETY QUESTIONNAIRES
GAD7 TOTAL SCORE: 13
7. FEELING AFRAID AS IF SOMETHING AWFUL MIGHT HAPPEN: MORE THAN HALF THE DAYS
3. WORRYING TOO MUCH ABOUT DIFFERENT THINGS: NOT AT ALL
4. TROUBLE RELAXING: NEARLY EVERY DAY
6. BECOMING EASILY ANNOYED OR IRRITABLE: NEARLY EVERY DAY
1. FEELING NERVOUS, ANXIOUS, OR ON EDGE: NEARLY EVERY DAY
2. NOT BEING ABLE TO STOP OR CONTROL WORRYING: NOT AT ALL
5. BEING SO RESTLESS THAT IT IS HARD TO SIT STILL: MORE THAN HALF THE DAYS

## 2022-01-17 ASSESSMENT — PATIENT HEALTH QUESTIONNAIRE - PHQ9: SUM OF ALL RESPONSES TO PHQ QUESTIONS 1-9: 15

## 2022-01-17 NOTE — TELEPHONE ENCOUNTER
Tried reaching out to patient to check them in for their virtual MH eval today, 1/17/2022 at 0800. Called, but no answer so a voice message was left for patient to return call to check in.

## 2022-01-17 NOTE — PROGRESS NOTES
"[facility]  Provider Name:  [facility] Bu[facility], [facility]         PATIENT'S NAME: Germain Iraheta  PREFERRED NAME: Khurram  PRONOUNS:  He / His / Him     MRN: [id number]  : 1985  ADDRESS: [address] [facility] [address]  ACCT. NUMBER:  [id number]  DATE OF SERVICE: 22  START TIME: 0800  END TIME: 0915  PREFERRED PHONE: [phone]  May we leave a program related message: Yes  EMAIL: [email or web address]  SERVICE MODALITY:  Video Visit       Provider verified identity through the following two step process.  Patient provided:  Patient  and Patient address    Telemedicine Visit: The patient's condition can be safely assessed and treated via synchronous audio and visual telemedicine encounter.      Reason for Telemedicine Visit: Patient has requested telehealth visit    Originating Site (Patient Location): Patient's home      Distant Site (Provider Location): Provider Remote Setting- Home Office    Consent:  The patient/guardian has verbally consented to: the potential risks and benefits of telemedicine (video visit) versus in person care; bill my insurance or make self-payment for services provided; and responsibility for payment of non-covered services.     Patient would like the video invitation sent by:  My Chart    Mode of Communication:  Video Conference via well    As the provider I attest to compliance with applicable laws and regulations related to telemedicine.    UNIVERSAL ADULT Mental Health DIAGNOSTIC ASSESSMENT    Identifying Information:  Patient is a 36 year old, . The pronoun use throughout this assessment reflects the patient's chosen pronoun.  Patient was referred for an assessment by probation.  Patient attended the session alone.     Chief Complaint:   The reason for seeking services at this time is: \"How upbeat of a personality I have. How happy go sasha I am day to day. How charismatic and charming of a fella I " "am. Until the walls that were holding my demons at bay came crumbling down unleashing every monster in closet, ghosts in the attic, and evil spirit I kept locked away.\"  The problem(s) began last summer when he relapsed on methamphetamine and suspected his girlfriend of 8 years was cheating on him. Patient reports that he was paranoid and doing \"crazy shit.\" Patient reports that he has a long history of methamphetamine use starting when he was about 11 years old when he and his brother found some in his mothers room. Patient has a history of multiple NICHOLE treatments, but denies any previous psychiatric history. He is currently  from his significant other (they have three children together, one is biologically his) and patient is endorses symptoms of depression with low mood, low interest and sadness along with anxiety and panic attacks as a result. He denies any of these symptoms prior to last summer. Patient reports he has not used methamphetamine for the last 5-7 weeks prior to his relapse last summer he reports 13 years clean from methamphetamine.    Patient has attempted substance use treatment several times.     Assessment and intervention included meeting with patient, review of Epic  notes. Psychotherapy techniques utilized include risk and safety assessment, establishing rapport, active and empathic listening, and validation of feelings and experiences.  An evaluation of strengths and vulnerabilities was completed. The patient's risk to self and others was assessed in the risks section of this document. Patient self reported mental health symptoms are detailed in the symptoms section of this document.      Social/Family History:  Patient reported they grew up in [place], [place]. They were raised by mom, patient had no contact with his father and recently just met him for the first time in the last year.  Father was in [facility] for methamphetamine while patient was growing up. Parents were never , " "patient believes he is a result of a one night stand. Patient has an older brother and a younger brother.  Patient reported that their childhood as such: \"Fine, shit happens. It wasn't very structured, mom was a [employer] chick and got high a lot. Little brothers dad moved in and was a meth head.\"  Patient described their current relationships with family of origin as such: \"Good.\" Patient reports mom no longer uses.    Cultural influences and impact on patient's life structure, values, norms, and healthcare: Denies. Contextual influences on patient's health include: Denies.  These factors will be addressed in the Preliminary Treatment plan. Patient identified their preferred language to be  Patient reported they do not need the assistance of an  or other support involved in therapy.     Patient reported had no significant delays in developmental tasks.  Patient's highest education level: GED while he was \"locked up\".  Patient identified the following learning problems: none reported.  Modifications will not be used to assist communication in therapy. Patient reports they are able to understand written materials.    Patient's current relationship status is \"complicated.\" He has been with his significant other 8 years.   Patient identified their sexual orientation as heterosexual.  Patient reported having 3 children, 1 biological. His biological child is two and currently with mom. Patient identified aunt, , little brother as part of their support system.  Patient identified the quality of these relationships as good, patient states his PO is really cool.    Patient's currently has been staying with him mom in [place] and they report that housing is stable, but it is a negative and toxic situation.    Patient is currently unemployed as of a couple days ago. Patient reports their finances are obtained through selling his belongings such as tools. Patient works as a  when he works. " "He reports he has an interview this afternoon. Patient does identify finances as a current stressor.      Patient reported that they have been involved with the legal system. He is a felon, he has a \"136 months hanging\" over his head. He has done six years of [employer] in three year stints. Patient has 15 convicted felonies. Burglaries, first degree burglaries, high speed chases, felony 5th degree assault. Multiple drug related charges. Patient report they are under probation/ parole/ jurisdiction in [facility] for the next 25 years.  Probation: [place].    Patient's Strengths and Limitations:  Patient identified the following strengths or resources that will help them succeed in treatment: commitment to health and well being, friends / good social support, insight and sense of humor. Things that may interfere with the patient's success in treatment include: housing, relationship status, substance use.     Assessments:  PHQ2:   PHQ-2 ( 1999 [employer]) 3/17/2021 2/4/2021 1/4/2021 11/18/2020 5/29/2020 4/8/2019 8/22/2013   Q1: Little interest or pleasure in doing things - 0 0 0 0 0 1   Q2: Feeling down, depressed or hopeless 0 0 0 0 0 0 0   PHQ-2 Score - 0 0 0 0 0 1   PHQ-2 Total Score (12-17 Years)- Positive if 3 or more points; Administer PHQ-A if positive - 0 0 0 0 0 -     PHQ9:   PHQ-9 SCORE 1/17/2022   PHQ-9 Total Score 15     PHQA: No flowsheet data found.  GAD2: No flowsheet data found.  GAD7:   BERTA-7 SCORE 1/17/2022   Total Score 13     Personal and Family Medical History:  Patient does not report a family history of mental health concerns.  Patient reports family history includes Cancer in his maternal grandfather; Diabetes in his father.     Patient reported the following previous diagnoses which include(s): Possible ADHD. He reports that he was diagnosed with ODD as a kid, indicating behavioral problems along with significant substance abuse.  Patient reports symptoms do not impact ability to function. " Patient has received mental health services in the past which include the following: No history. Psychiatric Hospitalizations: 72 hour old when he was about 16 and he reports it was substance induced. Patient denies a history of civil commitment.  Currently, patient is not receiving other mental health services.  These include none.     Patient has not had a physical exam to rule out medical causes for current symptoms.  Date of last physical exam was greater than a year ago and client was encouraged to schedule an exam with PCP. The patient has a [facility] Provider, who is named Lashay Yin. Patient denies any issues with pain.  There are not significant appetite / nutritional concerns / weight changes. Patient does report a history of head injury / trauma / cognitive impairment.  He recently had a seizure, but medical professionals are unsure why.    GAIN-SS Tool:    When was the last time that you had significant problems... 1/17/2022   with feeling very trapped, lonely, sad, blue, depressed or hopeless about the future? Past month   with sleep trouble, such as bad dreams, sleeping restlessly, or falling asleep during the day? Past Month   with feeling very anxious, nervous, tense, scared, panicked or like something bad was going to happen? Past month   with becoming very distressed & upset when something reminded you of the past? Never   with thinking about ending your life or committing suicide? 2 to 12 months ago     When was the last time that you did the following things 2 or more times? 1/17/2022   Lied or conned to get things you wanted or to avoid having to do something? Past month   Had a hard time paying attention at school, work or home? Past month   Had a hard time listening to instructions at school, work or home? Past month   Were a bully or threatened other people? Never   Started physical fights with other people? Never       Patient reports current meds as:   Outpatient  Medications Marked as Taking for the 1/17/22 encounter (Hospital Encounter) with [facility]   Medication Sig     amLODIPine (NORVASC) 2.5 MG tablet Take 1 tablet (2.5 mg) by mouth daily     gabapentin (NEURONTIN) 800 MG tablet Take 1 tablet (800 mg) by mouth At Bedtime     tadalafil (CIALIS) 20 MG tablet Take 1 tablet (20 mg) by mouth daily as needed (30-60 minutes prior to interourse)     traZODone (DESYREL) 100 MG tablet Take 2 tablets (200 mg) by mouth At Bedtime       Medication Adherence:  Patient reports they are taking their medications as prescribed      Patient Allergies:    Allergies   Allergen Reactions     Ceclor [Cefaclor Monohydrate] Rash     Erythromycin Rash     Lorabid [Loracarbef] Rash       Medical History:    Past Medical History:   Diagnosis Date     ED (erectile dysfunction)      Hypertension      Insomnia      RLS (restless legs syndrome)      Tobacco use disorder      Current Mental Status Exam:   Appearance:  Appropriate    Eye Contact:  Good   Psychomotor:  Normal       Gait / station:  no problem  Attitude / Demeanor: Cooperative  Pleasant  Speech      Rate / Production: Normal/ Responsive      Volume:  Normal  volume      Language:  intact  Mood:   Normal  Affect:   Appropriate    Thought Content: Clear   Thought Process: Goal Directed       Associations: No loosening of associations  Insight:   Good   Judgment:  Intact   Orientation:  All  Attention/concentration: Good    Rating Scales:    PHQ9:    PHQ-9 SCORE 1/17/2022   PHQ-9 Total Score 15       GAD7:    BERTA-7 SCORE 1/17/2022   Total Score 13     CGI:     First: 3  Most recent:0  Not able to assess    Substance Use:  Patient reported the following family history pertaining to substance use as such: Mother: Meth, Father: Meth. Father did extensive prison time due to meth use. Patient does not report a family history of mental health concerns.  Patient reports family history includes Cancer in his maternal grandfather; Diabetes  "in his father.. Patient has received substance use disorder and/or gambling treatment in the past.  Patient reports the following dates and locations of treatment services:  [facility] 2017. Patient has done [facility] treatment in the [facility], he also went to [facility] in [id number]   Patient has not ever been to detox. Patient is not currently receiving any chemical dependency treatment. Patient reports no history of support group attendance.    CAGE-AID:   CAGE-AID Total Score 1/17/2022   Total Score 3         Substance Age of first use Pattern and duration of use (include amounts and frequency) Date of last use     Withdrawal potential Route of administration   Alcohol  11 Pattern of use: Weekend use up to a twelve pack in the weekend.    Duration of pattern: \"A couple years.\" Couple weeks ago  No oral   Cannabis 11 Pattern of use: Patient has recently started using occasionally. Usually a hit or two.      Duration of pattern: Has used one time per month for the last three months. 1/16/2022 No smoked   Amphetamines 11 Pattern of use: \"I lived for it, I used it as a lifestyle.\" \"I was banging the  Needle\".    Duration of pattern: For many years until care home in 2007.    Current pattern of use: Intermediae use starting last summer. Once his significant other agreed to use they would use daily. Last used meth 5-7 weeks ago  smoked and IV - injected   Cocaine/crack   No history       Hallucinogens  No history      Inhalants  No history      Heroin  No history      Other Opiates  No history      Benzodiazepine        Barbiturates        Over the counter meds        Nicotine  11 Up to half pack per day. 1/7/2022   Smoke   other substances not listed above:  Identify:           Patient reported the following problems as a result of their substance use: Relationship, legal problems.  Patient is not concerned about substance use at present, but has been worried in the past. Patient reports his girlfriend is concerned " "about their substance use.  Patient reports their recovery goals are to remain abstinent.     Patient reports experiencing the following withdrawal symptoms within the past 12 months: none and the following within the past 30 days: none.   Patients DENIES urges to use Alcohol and Methamphetamine.  Patient reports he has used more Methamphetamine than intended and over a longer period of time than intended. Patient reports he has had unsuccessful attempts to cut down or control use of Methamphetamine.  Patient reports longest period of abstinence from meth is 13 years and return to use was due to: \"I thought maybe because I told people that I will never let an object control me, I didn't want to quit.\"  Patient reports he has needed to use more Methamphetamine to achieve the same effect.  Patient does  report diminished effect with use of same amount of Methamphetamine. Patient does  report a great deal of time is spent in activities necessary to obtain, use, or recover from Methamphetamine effects.  Patient does  report important social, occupational, or recreational activities are given up or reduced because of Methamphetamine use.  Alcohol and Methamphetamine use is continued despite knowledge of having a persistent or recurrent physical or psychological problem that is likely to have caused or exacerbated by use.  Patient reports the following problem behaviors while under the influence of substances: Reactions. Patient reports substance use has impacted their ability to function in a school setting. Only reason the patient graduated was because he was \"locked up.\" Patient reports substance use has ever impacted their ability to function in a work setting.  Patients demographics and history impact their recovery in the following ways: Reports best friend is a dealer, but patient has cut all ties with him.  Patient reports engaging in the following recreation/leisure activities while using: Illegal activity. " Patient reports the following people are supportive of recovery: Family and friends.    Based on the positive CAGE score and clinical interview there are indications of drug or alcohol abuse. There are not recommendations for structured treatment or community support programming at present thought patient certainly would meet criteria for an OP program. However, patient has a history of multiple NICHOLE treatment programs, but he has never had any mental health services in place. I suspect he likely has some trauma associated with his upbringing. In the past, he has been able to abstain from meth for 13 years and now self reports that he has not used in 5-7 weeks. If problematic use begins/returns with individual therapy in place, patient should be seen for an updated straight NICHOLE assessment to determine if they meet criteria for any level of NICHOLE programming. Diagnostic assessment for substance use disorder completed. Recommendations will include individual therapy with a dual licensed mental health and addiction provider.    Dimension Scale Ratings:    Dimension 1 -  Acute Intoxication/Withdrawal: 0 - No Problem: Patient has not used in 5-7 weeks. No history of complicated withdrawals.  Dimension 2 - Biomedical: 0 - No Problem: No major medical concerns.  Dimension 3 - Emotional/Behavioral/Cognitive Conditions: 2 - Moderate Problem: Significant external stressors. Relationship issues  Dimension 4 - Readiness to Change:  0 - No Problem: Verbalizes compliance and motivation  Dimension 5 - Relapse/Continued Use/ Continued Problem Potential: 2 - Moderate Problem:  History of relapse, significant external stressors - relationship, employment, housing, children.  Dimension 6 - Recovery Environment:  2 - Moderate Problem: Significant legal involvement as a result of substance use, currently having to stay with mom in negative environment    Significant Losses / Trauma / Abuse / Neglect Issues:   Patient did not serve in the  [employer].  There are indications or report of significant loss, trauma, abuse or neglect issues related to: are indications but client denies any losses, trauma, abuse, or neglect concerns. Patient reports he grew up with his mother who was high a lot and used meth. Had boyfriends live with them that also used meth.   Concerns for possible neglect are not present.      Safety Assessment:   Current Safety Concerns:  [place] Suicide Severity Rating Scale (Short Version)  [place] Suicide Severity Rating (Short Version) 7/6/2021 10/29/2021 11/10/2021 11/20/2021 11/22/2021 11/23/2021 1/17/2022   Over the past 2 weeks have you felt down, depressed, or hopeless? no no no no no no yes   Over the past 2 weeks have you had thoughts of killing yourself? no no no no no no no   Have you ever attempted to kill yourself? no no no no no no no   Q1 Wished to be Dead (Past Month) - - - - - - -   Q2 Suicidal Thoughts (Past Month) - - - - - - -   Q6 Suicide Behavior (Lifetime) - - - - - - -     [place] Suicide Severity Rating Scale (Lifetime/Recent)No flowsheet data found.  Patient denies current homicidal ideation and behaviors.  Patient denies current self-injurious ideation and behaviors.    Patient denied risk behaviors associated with substance use.  Patient reported substance use associated with mental health symptoms.  Patient reports the following current concerns for their personal safety: None.  Patient reports there are no firearms in the house.         History of Safety Concerns:  Patient reports that if someone disrespects him and offer to fight him and looks forward to it.  Patient denied a history of personal safety concerns.    Patient reported a history of assaultive behaviors.  For several years ago, probably since 2006.  Patient denied a history of sexual assault behaviors.     Patient denied a history of risk behaviors associated with substance use.  Patient reported a history of substance use associated with  mental health symptoms.  Patient reports the following protective factors: Kids, family    Risk Plan:  See Recommendations for Safety and Risk Management Plan    Review of Symptoms per patient report:  In terms of mental health symptoms, the patient reports the following:     Depressive episode: reports depressed mood, characterized as moderate, without presence of neurovegetative symptoms.  Lack of interest, Excessive or inappropriate guilt, Change in energy level, Difficulties concentrating, Suicidal ideation, Irritability, Feeling sad, down, or depressed and Withdrawn. Patient first noticed these types of symptoms about a year ago when he and his significant other starting using methamphetamine last summer.      Shabana:  No Symptoms    Psychosis:   Auditory hallucinations and Visual hallucinations, when under the influence. Patient does admit to paranoia.    Anxiety: Excessive worry, Nervousness, Poor concentration and Irritability    Panic: Palpitations, Shortness of breath and Hot or cold flashes    SI/SIB/ SA:  Endorses passive suicidal ideation, denies plan, denies intent, able to contract for safety. Patient does not have a history of suicide attempts.     Post Traumatic Stress Disorder:  No Symptoms     Eating Disorder: No Symptoms    ADD / ADHD: No symptoms    Conduct Disorder: No symptoms    Autism Spectrum Disorder: No symptoms    Obsessive Compulsive Disorder: No Symptoms    Diagnostic Criteria:   Methamphetamine Use Disorder-Severe - In early remission  Substance is often taken in larger amounts or over a longer period than was intended.  Met for:  methamphetamine. There is persistent desire or unsuccessful efforts to cut down or control use of the substance.  Met for:  methamphetamine,  A great deal of time is spent in activities necessary to obtain the substance, use the substance, or recover from its effects.  Met for:  methamphetamine, Craving, or a strong desire or urge to use the substance.  Met  for:  methamphetamine Recurrent use of the substance resulting in a failure to fulfill major role obligations at work, school, or home.  Met for:  methamphetamine Continued use of the substance despite having persistent or recurrent social or interpersonal problems caused or exacerbated by the effects of its use.  Met for:  methamphetamine, Important social, occupational, or recreational activities are given up or reduced because of the substance.  Met for:  methamphetamine Recurrent use of the substance in which it is physically hazardous.  Met for:  methamphetamineUse of the substance is continued despite knowledge of having a persistent or recurrent physical or psychological problem that is likely to have been cause or exacerbated by the substance.  Met for:  methamphetamine, Tolerance:  either a need for markedly increased amounts of the substance to achieve the desired effect or a markedly diminished effect with continued use of the dame amount of the substance.  Met for:  methamphetamine    Adjustment Disorder with mixed anxiety and depressed mood-  A. The development of emotional or behavioral symptoms in response to an identifiable stressor(s) occurring within 3 months of the onset of the stressor(s)  B. These symptoms or behaviors are clinically significant, as evidenced by one or both of the following:       - Marked distress that is out of proportion to the severity/intensity of the stressor (with consideration for external context & culture)       - Significant impairment in social, occupational, or other important areas of functioning  C. The stress-related disturbance does not meet criteria for another disorder & is not an exacerbation of another mental disorder  D. The symptoms do not represent normal bereavement  E. Once the stressor or its consequences have terminated, the symptoms do not persist for more than an additional 6 months       * Adjustment Disorder with Mixed Anxiety and Depressed Mood:  The predominant manifestation is a combination    Functional Status:  Patient reports the following functional impairments: childcare / parenting, relationship(s), self-care and work / vocational responsibilities.       WHODAS:   WHODAS 2.0 Total Score 1/17/2022   Total Score 15     Nonprogrammatic care:  Patient is requesting basic services to address current mental health concerns.    Clinical Summary:  1. Reason for assessment: Patient referred by current providers due to worsening mental health symptoms and suicidal ideation.  2. Psychosocial, Cultural and Contextual Factors: None identified.  3. 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  4. Other Diagnoses that is relevant to services:  NA  5. Provisional Diagnosis:  301.9 (F60.9) Unspecified Personality Disorder.  6. Prognosis: Return to Normal Functioning and Relieve Acute Symptoms.  7. Likely consequences of symptoms if not treated:  If untreated, patient's mental health will likely deteriorate and may require a higher level of care.  8. Client strengths include:  committed to sobriety, goal-focused, insightful, open to learning, open to suggestions / feedback, willing to relate to others and work history .     1. A safety and risk management plan has been developed including: Recommended that patient call 911 or go to the local ED should there be a change in any of these risk factors. Report to child / adult protection services was not applicable.     2. Patient did not identify Jehovah's witness, ethnic or cultural issues relevant to therapy at this time     3. Initial Treatment will focus on:               Depressed Mood -               Anxiety -               Functional Impairment at:  home and school/work.              Risk Management / Safety Concerns related to: Substance use     4. Resources/Service Plan:    services are not indicated.   Modifications to assist communication are not indicated.   Additional disability accommodations are not indicated.      5. Collaboration:   Collaboration / coordination of treatment will be initiated with the following support professionals:      6.  Referrals:   The following referral(s) will be initiated: Individual therapy.       Date: Sunday, 2/20/2022  Time: 4:00 pm - 5:00 pm     Provider: Hemal BHATTI  LICSW     Location: [facility], [address], [address], [place] [address]     Phone: [phone]     Type: Teletherapy     A Release of Information has been obtained for the following:  [facility] ([facility] [phone] (F)  [phone]    7. NICHOLE:     NICHOLE:  Discussed the general effects of drugs and alcohol on health and well-being and reasons for relapses. Recommendations: Individual therapy with a dual licensed provider.    8. Records:   These were reviewed at time of assessment. Information in this assessment was obtained from the medical record and provided by patient who presents as a good historian. Patient will have open access to their mental health medical record.    Recommendations:  -Individual therapy with a co occurring mental health provider.  -Referral to Transition [facility] depending on wait time for appointments.    Clinical Substantiation for the above recommendations:  Patient is a 36 year old male who presents for a court ordered diagnostic assessment, after hearing a bit about what was going on legally with the patient it was determined that a NICHOLE assessment was likely what he needed. Patient reports that after 13 years of being off methampetamine he relapsed last summer when he suspected that his significant other of 8 years had been cheating on him with the  individual that kept offering him methamphetamines. Patient reports that once he started it quickly became out of control. He is now  from his significant other, he reports spending Leanne alone without his children realizing that he needed to do something different. Patient has a significant legal background mostly revolving around substance use.He is currently on probation for the next 25 years.      He has a history of multiple NICHOLE programming, both forced and voluntary, but denies any history of mental health services. Patient grew up with a mother and her boyfriends that were high all the time, I suspect he has childhood trauma associated with that which requires processing. Patient endorses symptoms of an adjustment disorder which started after he  from his significant other he started to experience symptoms such as worry, anxiety, panic attacks, low mood, low energy and passive fleeting thoughts of suicide. He last endorses SI sometime in Decemeber.  Patient denies any previous mental healthy symptoms, stating prior to his relapse he had an upbeat personality, was happy go sasha, charismatic and charming.       Patient has no psychiatric history, I would like to see him work with an individual therapist that works with both mental health and addiction. If patient is unable to manage sobriety under that level of care, he should be seen for an updated NICHOLE assessment to determine if he requires a higher level of treatment. Patient is currently  from his significant other, if the relationship dissolves, there are concerns that he will relapse.  Patient agrees and agrees to work with an individual therapist.    The patient's acute suicide risk was determined to be low due to the following factors: Denial of suicidal thoughts, no history of suicide attempts. Patient is not currently under the influence of alcohol or illicit substances, denies experiencing command hallucinations, and  has no immediate access to firearms. The patient's acute risk could be higher if noncompliant with their treatment plan, medications, follow-up appointments or using illicit substances or alcohol. Protective factors include: Significant other, children, job    Provider Name/ Credentials: ERIKA Bennett, OSCARC,  Diagnostic Assessment completed on January 17, 2022

## 2022-01-18 ASSESSMENT — ANXIETY QUESTIONNAIRES: GAD7 TOTAL SCORE: 13

## 2022-01-31 ENCOUNTER — HOSPITAL ENCOUNTER (EMERGENCY)
Facility: CLINIC | Age: 37
Discharge: HOME | End: 2022-01-31
Attending: PHYSICIAN ASSISTANT | Admitting: PHYSICIAN ASSISTANT
Payer: COMMERCIAL

## 2022-01-31 ENCOUNTER — TELEPHONE (OUTPATIENT)
Dept: FAMILY MEDICINE | Facility: CLINIC | Age: 37
End: 2022-01-31
Payer: COMMERCIAL

## 2022-01-31 VITALS
HEART RATE: 106 BPM | OXYGEN SATURATION: 98 % | RESPIRATION RATE: 16 BRPM | BODY MASS INDEX: 28 KG/M2 | WEIGHT: 200 LBS | TEMPERATURE: 99 F | HEIGHT: 71 IN

## 2022-01-31 DIAGNOSIS — J02.0 STREP THROAT: ICD-10-CM

## 2022-01-31 LAB
DEPRECATED S PYO AG THROAT QL EIA: POSITIVE
FLUAV RNA SPEC QL NAA+PROBE: NEGATIVE
FLUBV RNA RESP QL NAA+PROBE: NEGATIVE
SARS-COV-2 RNA RESP QL NAA+PROBE: NEGATIVE

## 2022-01-31 PROCEDURE — G0463 HOSPITAL OUTPT CLINIC VISIT: HCPCS | Performed by: PHYSICIAN ASSISTANT

## 2022-01-31 PROCEDURE — 87880 STREP A ASSAY W/OPTIC: CPT | Performed by: PHYSICIAN ASSISTANT

## 2022-01-31 PROCEDURE — 99214 OFFICE O/P EST MOD 30 MIN: CPT | Performed by: PHYSICIAN ASSISTANT

## 2022-01-31 PROCEDURE — 250N000009 HC RX 250: Performed by: PHYSICIAN ASSISTANT

## 2022-01-31 PROCEDURE — 87636 SARSCOV2 & INF A&B AMP PRB: CPT | Performed by: PHYSICIAN ASSISTANT

## 2022-01-31 PROCEDURE — C9803 HOPD COVID-19 SPEC COLLECT: HCPCS | Performed by: PHYSICIAN ASSISTANT

## 2022-01-31 RX ORDER — PENICILLIN V POTASSIUM 500 MG/1
500 TABLET, FILM COATED ORAL 2 TIMES DAILY
Qty: 20 TABLET | Refills: 0 | Status: SHIPPED | OUTPATIENT
Start: 2022-01-31 | End: 2022-02-10

## 2022-01-31 RX ORDER — DEXAMETHASONE SODIUM PHOSPHATE 4 MG/ML
10 VIAL (ML) INJECTION ONCE
Status: COMPLETED | OUTPATIENT
Start: 2022-01-31 | End: 2022-01-31

## 2022-01-31 RX ADMIN — DEXAMETHASONE SODIUM PHOSPHATE 10 MG: 4 INJECTION, SOLUTION INTRAMUSCULAR; INTRAVENOUS at 16:45

## 2022-01-31 ASSESSMENT — MIFFLIN-ST. JEOR: SCORE: 1849.32

## 2022-01-31 NOTE — TELEPHONE ENCOUNTER
Reason for call:    Symptom or request:     Patient called stating that he has a severe sore throat and problems swallowing.         Best Time:  any    Can we leave a detailed message on this number?  YES     Jamee SOOD  Station

## 2022-01-31 NOTE — TELEPHONE ENCOUNTER
Pt sayst he lymph nodes in his neck are swollen and this tonsils are almost touching, if he lies down he feels can't breathe right and can't get enough air. Pt told to go to ER immediately, he will go to the [place] [facility]. Kiana Nagel RN

## 2022-01-31 NOTE — ED PROVIDER NOTES
"  History     Chief Complaint   Patient presents with     Pharyngitis     started a few days ago     HPI  Germain Iraheta is a 36 year old male who presents to the urgent care with concern over sore throat which sometimes last 3 days.  Patient complains of subjective fever, chills, illness, swollen tonsils, swollen glands.  He also has had nasal congestion.  He has not measured his temp objectively with thermometer.  He has not had any significant cough, dyspnea, wheezing, vomiting, diarrhea or abdominal complaints.  He states concerned that he had similar throat pain last year had multiple ER visits before he finally was diagnosed with \"chronic tonsillitis\" and given prescription for antibiotic in the clinic which lead to resolution of symptoms.  He did schedule follow up appointment with ENT scheduled for next week.      Allergies:  Allergies   Allergen Reactions     Ceclor [Cefaclor Monohydrate] Rash     Erythromycin Rash     Lorabid [Loracarbef] Rash     Problem List:    Patient Active Problem List    Diagnosis Date Noted     Essential hypertension 10/01/2021     Priority: Medium     Persistent disorder of initiating or maintaining sleep 10/01/2021     Priority: Medium     Bilateral leg cramps 10/01/2021     Priority: Medium     Nocturnal leg cramps 10/01/2021     Priority: Medium     Perforation of right tympanic membrane 11/20/2020     Priority: Medium     Added automatically from request for surgery [id number]       Conductive hearing loss of right ear with unrestricted hearing of left ear 11/20/2020     Priority: Medium     Added automatically from request for surgery [id number]       Impotence of organic origin 06/05/2013     Priority: Medium     CARDIOVASCULAR SCREENING; LDL GOAL LESS THAN 160 06/04/2013     Priority: Medium      Past Medical History:    Past Medical History:   Diagnosis Date     ED (erectile dysfunction)      Hypertension      Insomnia      RLS (restless legs syndrome)      Tobacco use " "disorder      Past Surgical History:    Past Surgical History:   Procedure Laterality Date     PE TUBES       TYMPANOPLASTY Right 1/18/2021    Procedure: RIGHT TYMPANOPLASTY WITH CARTILAGE BACKING;  Surgeon: Sandra Peña MD;  Location: [facility] OR     Family History:    Family History   Problem Relation Age of Onset     Diabetes Father      Cancer Maternal Grandfather         lung     Hypertension No family hx of      Social History:  Marital Status:  Single [1]  Social History     Tobacco Use     Smoking status: Current Every Day Smoker     Packs/day: 0.50     Years: 3.00     Pack years: 1.50     Types: Cigarettes     Smokeless tobacco: Never Used   Vaping Use     Vaping Use: Never used   Substance Use Topics     Alcohol use: Yes     Alcohol/week: 0.0 standard drinks     Comment: 5 drinks a month- weekend use     Drug use: Not Currently     Types: Marijuana, Methamphetamines     Comment: past use      Medications:    amLODIPine (NORVASC) 2.5 MG tablet  gabapentin (NEURONTIN) 800 MG tablet  tadalafil (CIALIS) 20 MG tablet  traZODone (DESYREL) 100 MG tablet  albuterol (PROAIR HFA/PROVENTIL HFA/VENTOLIN HFA) 108 (90 Base) MCG/ACT inhaler  chlorthalidone (HYGROTON) 25 MG tablet  ibuprofen (ADVIL/MOTRIN) 200 MG tablet      Review of Systems  CONSTITUTIONAL:POSITIVE  for subjective fever, chills, myalgias   INTEGUMENTARY/SKIN: NEGATIVE for worrisome rashes, moles or lesions  EYES: NEGATIVE for vision changes or irritation  ENT/MOUTH: POSITIVE for sore throat and nasal congestion NEGATIVE for ear pain   RESP:NEGATIVE for significant cough or SOB  GI:  NEGATIVE for nausea, vomiting, diarrhea or abdominal pain  Physical Exam   Pulse: 106  Temp: 99  F (37.2  C)  Resp: 16  Height: 180.3 cm (5' 11\")  Weight: 90.7 kg (200 lb) (stated)  SpO2: 98 %  Physical Exam  GENERAL APPEARANCE: healthy, alert and no distress  EYES: EOMI,  PERRL, conjunctiva clear  HENT: ear canals and TM's normal.  hypertrophic erythematous tonsils   NECK: " supple, nontender, bilateral cervical lymphadenopathy   RESP: lungs clear to auscultation - no rales, rhonchi or wheezes  CV: regular rates and rhythm, normal S1 S2, no murmur noted  ABDOMEN:  soft, nontender, no HSM or masses and bowel sounds normal  SKIN: no suspicious lesions or rashes  ED Course           Procedures       Critical Care time:  none        Results for orders placed or performed during the hospital encounter of 01/31/22   Symptomatic; Yes; 1/24/2022 Influenza A/B & SARS-CoV2 (COVID-19) Virus PCR Multiplex Nasopharyngeal     Status: Normal    Specimen: Nasopharyngeal; Swab   Result Value Ref Range    Influenza A PCR Negative Negative    Influenza B PCR Negative Negative    SARS CoV2 PCR Negative Negative    Narrative    Testing was performed using the cyn SARS-CoV-2 & Influenza A/B Assay on the cyn Rosa System. This test should be ordered for the detection of SARS-CoV-2 and influenza viruses in individuals who meet clinical and/or epidemiological criteria. Test performance is unknown in asymptomatic patients. This test is for in vitro diagnostic use under the FDA EUA for laboratories certified under CLIA to perform moderate and/or high complexity testing. This test has not been FDA cleared or approved. A negative result does not rule out the presence of PCR inhibitors in the specimen or target RNA in concentration below the limit of detection for the assay. If only one viral target is positive but coinfection with multiple targets is suspected, the sample should be re-tested with another FDA cleared, approved or authorized test, if coinfection would change clinical management. [facility] are certified under the Clinical Laboratory Improvement Amendments of 1988 (CLIA-88) as  qualified to perform moderate and/or high complexity laboratory testing.   Streptococcus A Rapid Scr w Reflx to PCR     Status: Abnormal    Specimen: Throat; Swab   Result Value Ref Range    Group A Strep  antigen Positive (A) Negative     Medications   dexamethasone (DECADRON) injectable solution used ORALLY 10 mg (10 mg Oral Given 1/31/22 1645)     Assessments & Plan (with Medical Decision Making)     I have reviewed the nursing notes.  I have reviewed the findings, diagnosis, plan and need for follow up with the patient.     Discharge Medication List as of 1/31/2022  5:02 PM      START taking these medications    Details   penicillin V (VEETID) 500 MG tablet Take 1 tablet (500 mg) by mouth 2 times daily for 10 days, Disp-20 tablet, R-0, E-Prescribe           Final diagnoses:   Strep throat     RST positive.  Patient will be initiated on antibiotics.  Patient and family notified contagious for 24 hours after initiating antibiotics.  Negative COVID-19 and influenza testing.   Follow up with PCP if no improvement in three days.  Worrisome reasons to seek care sooner discussed.      Disclaimer: This note consists of symbols derived from keyboarding, dictation, and/or voice recognition software. As a result, there may be errors in the script that have gone undetected.  Please consider this when interpreting information found in the chart.    1/31/2022   [facility] EMERGENCY DEPT     Aidee Lopez PA-C  02/04/22 [id number]

## 2022-03-01 ENCOUNTER — APPOINTMENT (OUTPATIENT)
Dept: GENERAL RADIOLOGY | Facility: CLINIC | Age: 37
End: 2022-03-01
Attending: PHYSICIAN ASSISTANT
Payer: COMMERCIAL

## 2022-03-01 ENCOUNTER — APPOINTMENT (OUTPATIENT)
Dept: CT IMAGING | Facility: CLINIC | Age: 37
End: 2022-03-01
Attending: PHYSICIAN ASSISTANT
Payer: COMMERCIAL

## 2022-03-01 ENCOUNTER — NURSE TRIAGE (OUTPATIENT)
Dept: FAMILY MEDICINE | Facility: CLINIC | Age: 37
End: 2022-03-01
Payer: COMMERCIAL

## 2022-03-01 ENCOUNTER — HOSPITAL ENCOUNTER (EMERGENCY)
Facility: CLINIC | Age: 37
Discharge: HOME | End: 2022-03-01
Attending: PHYSICIAN ASSISTANT | Admitting: PHYSICIAN ASSISTANT
Payer: COMMERCIAL

## 2022-03-01 VITALS
OXYGEN SATURATION: 98 % | HEIGHT: 71 IN | BODY MASS INDEX: 28 KG/M2 | TEMPERATURE: 97.8 F | DIASTOLIC BLOOD PRESSURE: 83 MMHG | SYSTOLIC BLOOD PRESSURE: 133 MMHG | WEIGHT: 200 LBS | HEART RATE: 92 BPM

## 2022-03-01 DIAGNOSIS — M54.50 RIGHT-SIDED LOW BACK PAIN WITHOUT SCIATICA: ICD-10-CM

## 2022-03-01 DIAGNOSIS — R10.9 RIGHT FLANK PAIN: ICD-10-CM

## 2022-03-01 DIAGNOSIS — M25.512 LEFT SHOULDER PAIN: ICD-10-CM

## 2022-03-01 DIAGNOSIS — R10.31 ABDOMINAL PAIN, RIGHT LOWER QUADRANT: ICD-10-CM
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
## 2022-03-01 DIAGNOSIS — K62.5 RECTAL BLEEDING: ICD-10-CM

## 2022-03-01 PROBLEM — G25.81 RESTLESS LEG SYNDROME: Status: ACTIVE | Noted: 2017-04-19

## 2022-03-01 LAB
ANION GAP SERPL CALCULATED.3IONS-SCNC: 3 MMOL/L (ref 3–14)
BASOPHILS # BLD AUTO: 0.1 10E3/UL (ref 0–0.2)
BASOPHILS NFR BLD AUTO: 1 %
BUN SERPL-MCNC: 13 MG/DL (ref 7–30)
CALCIUM SERPL-MCNC: 9.3 MG/DL (ref 8.5–10.1)
CHLORIDE BLD-SCNC: 107 MMOL/L (ref 94–109)
CO2 SERPL-SCNC: 31 MMOL/L (ref 20–32)
CREAT SERPL-MCNC: 1.07 MG/DL (ref 0.66–1.25)
EOSINOPHIL # BLD AUTO: 0.3 10E3/UL (ref 0–0.7)
EOSINOPHIL NFR BLD AUTO: 4 %
ERYTHROCYTE [DISTWIDTH] IN BLOOD BY AUTOMATED COUNT: 12.1 % (ref 10–15)
GFR SERPL CREATININE-BSD FRML MDRD: >90 ML/MIN/1.73M2
GLUCOSE BLD-MCNC: 86 MG/DL (ref 70–99)
HCT VFR BLD AUTO: 45.4 % (ref 40–53)
HGB BLD-MCNC: 15.3 G/DL (ref 13.3–17.7)
IMM GRANULOCYTES # BLD: 0 10E3/UL
IMM GRANULOCYTES NFR BLD: 0 %
LYMPHOCYTES # BLD AUTO: 3.2 10E3/UL (ref 0.8–5.3)
LYMPHOCYTES NFR BLD AUTO: 36 %
MCH RBC QN AUTO: 30.2 PG (ref 26.5–33)
MCHC RBC AUTO-ENTMCNC: 33.7 G/DL (ref 31.5–36.5)
MCV RBC AUTO: 90 FL (ref 78–100)
MONOCYTES # BLD AUTO: 0.7 10E3/UL (ref 0–1.3)
MONOCYTES NFR BLD AUTO: 8 %
NEUTROPHILS # BLD AUTO: 4.7 10E3/UL (ref 1.6–8.3)
NEUTROPHILS NFR BLD AUTO: 51 %
NRBC # BLD AUTO: 0 10E3/UL
NRBC BLD AUTO-RTO: 0 /100
PLATELET # BLD AUTO: 278 10E3/UL (ref 150–450)
POTASSIUM BLD-SCNC: 3.7 MMOL/L (ref 3.4–5.3)
RBC # BLD AUTO: 5.07 10E6/UL (ref 4.4–5.9)
SODIUM SERPL-SCNC: 141 MMOL/L (ref 133–144)
WBC # BLD AUTO: 9 10E3/UL (ref 4–11)

## 2022-03-01 PROCEDURE — 72131 CT LUMBAR SPINE W/O DYE: CPT

## 2022-03-01 PROCEDURE — 74177 CT ABD & PELVIS W/CONTRAST: CPT

## 2022-03-01 PROCEDURE — 36415 COLL VENOUS BLD VENIPUNCTURE: CPT | Performed by: PHYSICIAN ASSISTANT

## 2022-03-01 PROCEDURE — 82310 ASSAY OF CALCIUM: CPT | Performed by: PHYSICIAN ASSISTANT

## 2022-03-01 PROCEDURE — 73030 X-RAY EXAM OF SHOULDER: CPT | Mod: LT

## 2022-03-01 PROCEDURE — 85025 COMPLETE CBC W/AUTO DIFF WBC: CPT | Performed by: PHYSICIAN ASSISTANT

## 2022-03-01 PROCEDURE — 96374 THER/PROPH/DIAG INJ IV PUSH: CPT | Mod: 59 | Performed by: PHYSICIAN ASSISTANT

## 2022-03-01 PROCEDURE — 250N000011 HC RX IP 250 OP 636: Performed by: PHYSICIAN ASSISTANT

## 2022-03-01 PROCEDURE — G0463 HOSPITAL OUTPT CLINIC VISIT: HCPCS | Mod: 25 | Performed by: PHYSICIAN ASSISTANT

## 2022-03-01 PROCEDURE — 250N000009 HC RX 250: Performed by: PHYSICIAN ASSISTANT

## 2022-03-01 PROCEDURE — 99214 OFFICE O/P EST MOD 30 MIN: CPT | Performed by: PHYSICIAN ASSISTANT

## 2022-03-01 RX ORDER — KETOROLAC TROMETHAMINE 30 MG/ML
15 INJECTION, SOLUTION INTRAMUSCULAR; INTRAVENOUS ONCE
Status: COMPLETED | OUTPATIENT
Start: 2022-03-01 | End: 2022-03-01

## 2022-03-01 RX ORDER — IOPAMIDOL 755 MG/ML
97 INJECTION, SOLUTION INTRAVASCULAR ONCE
Status: COMPLETED | OUTPATIENT
Start: 2022-03-01 | End: 2022-03-01

## 2022-03-01 RX ADMIN — SODIUM CHLORIDE 62 ML: 9 INJECTION, SOLUTION INTRAVENOUS at 18:41

## 2022-03-01 RX ADMIN — IOPAMIDOL 97 ML: 755 INJECTION, SOLUTION INTRAVENOUS at 18:41

## 2022-03-01 RX ADMIN — KETOROLAC TROMETHAMINE 15 MG: 30 INJECTION, SOLUTION INTRAMUSCULAR at 18:00

## 2022-03-01 ASSESSMENT — ENCOUNTER SYMPTOMS
BACK PAIN: 1
FEVER: 0
RESPIRATORY NEGATIVE: 1
CONSTITUTIONAL NEGATIVE: 1
NEUROLOGICAL NEGATIVE: 1
SHORTNESS OF BREATH: 0
ABDOMINAL PAIN: 1
FLANK PAIN: 1
BLOOD IN STOOL: 1
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
## 2022-03-05 ENCOUNTER — HEALTH MAINTENANCE LETTER (OUTPATIENT)
Age: 37
End: 2022-03-05

## 2022-05-12 ENCOUNTER — HOSPITAL ENCOUNTER (OUTPATIENT)
Dept: BEHAVIORAL HEALTH | Facility: CLINIC | Age: 37
Discharge: HOME | End: 2022-05-12
Attending: FAMILY MEDICINE | Admitting: FAMILY MEDICINE
Payer: COMMERCIAL

## 2022-05-12 PROCEDURE — 90791 PSYCH DIAGNOSTIC EVALUATION: CPT | Mod: GT,95 | Performed by: COUNSELOR

## 2022-05-12 ASSESSMENT — ANXIETY QUESTIONNAIRES
2. NOT BEING ABLE TO STOP OR CONTROL WORRYING: NEARLY EVERY DAY
3. WORRYING TOO MUCH ABOUT DIFFERENT THINGS: NEARLY EVERY DAY
GAD7 TOTAL SCORE: 19
1. FEELING NERVOUS, ANXIOUS, OR ON EDGE: MORE THAN HALF THE DAYS
4. TROUBLE RELAXING: NEARLY EVERY DAY
5. BEING SO RESTLESS THAT IT IS HARD TO SIT STILL: NEARLY EVERY DAY
7. FEELING AFRAID AS IF SOMETHING AWFUL MIGHT HAPPEN: MORE THAN HALF THE DAYS
6. BECOMING EASILY ANNOYED OR IRRITABLE: NEARLY EVERY DAY

## 2022-05-12 ASSESSMENT — COLUMBIA-SUICIDE SEVERITY RATING SCALE - C-SSRS
5. HAVE YOU STARTED TO WORK OUT OR WORKED OUT THE DETAILS OF HOW TO KILL YOURSELF? DO YOU INTEND TO CARRY OUT THIS PLAN?: NO
1. IN THE PAST MONTH, HAVE YOU WISHED YOU WERE DEAD OR WISHED YOU COULD GO TO SLEEP AND NOT WAKE UP?: YES
6. HAVE YOU EVER DONE ANYTHING, STARTED TO DO ANYTHING, OR PREPARED TO DO ANYTHING TO END YOUR LIFE?: YES
REASONS FOR IDEATION LIFETIME: MOSTLY TO END OR STOP THE PAIN (YOU COULDN'T GO ON LIVING WITH THE PAIN OR HOW YOU WERE FEELING)
4. HAVE YOU HAD THESE THOUGHTS AND HAD SOME INTENTION OF ACTING ON THEM?: YES
1. IN THE PAST MONTH, HAVE YOU WISHED YOU WERE DEAD OR WISHED YOU COULD GO TO SLEEP AND NOT WAKE UP?: YES
2. HAVE YOU ACTUALLY HAD ANY THOUGHTS OF KILLING YOURSELF IN THE PAST MONTH?: YES
2. HAVE YOU ACTUALLY HAD ANY THOUGHTS OF KILLING YOURSELF LIFETIME?: NO
3. HAVE YOU BEEN THINKING ABOUT HOW YOU MIGHT KILL YOURSELF?: NO

## 2022-05-12 ASSESSMENT — PATIENT HEALTH QUESTIONNAIRE - PHQ9: SUM OF ALL RESPONSES TO PHQ QUESTIONS 1-9: 16

## 2022-05-12 ASSESSMENT — PAIN SCALES - GENERAL: PAINLEVEL: NO PAIN (0)
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
## 2022-05-13 ASSESSMENT — ANXIETY QUESTIONNAIRES: GAD7 TOTAL SCORE: 19

## 2022-05-20 DIAGNOSIS — N52.9 IMPOTENCE OF ORGANIC ORIGIN: ICD-10-CM

## 2022-05-20 RX ORDER — TADALAFIL 20 MG/1
20 TABLET ORAL DAILY PRN
Qty: 60 TABLET | Refills: 0 | Status: SHIPPED | OUTPATIENT
Start: 2022-05-20 | End: 2022-08-03

## 2022-05-20 NOTE — TELEPHONE ENCOUNTER
Tadalafil 20        Last written prescription date:         Last fill quantity: 60, # refills:         Last office visit:         Future office visit:         Is this a controlled substance?  No       Routing refill request to provider for review/approval because:     Thank You!  Maya Padilla  Certified Pharmacy Technician  [facility]  P: [phone] F:[phone]

## 2022-08-02 DIAGNOSIS — G47.62 NOCTURNAL LEG CRAMPS: ICD-10-CM

## 2022-08-02 DIAGNOSIS — R25.2 BILATERAL LEG CRAMPS: ICD-10-CM

## 2022-08-02 DIAGNOSIS — N52.9 IMPOTENCE OF ORGANIC ORIGIN: ICD-10-CM

## 2022-08-03 RX ORDER — TADALAFIL 20 MG/1
TABLET ORAL
Qty: 60 TABLET | Refills: 0 | Status: SHIPPED | OUTPATIENT
Start: 2022-08-03 | End: 2022-09-07

## 2022-08-03 RX ORDER — GABAPENTIN 800 MG/1
800 TABLET ORAL AT BEDTIME
Qty: 90 TABLET | Refills: 0 | Status: SHIPPED | OUTPATIENT
Start: 2022-08-03

## 2022-08-29 NOTE — ANESTHESIA CARE TRANSFER NOTE
Patient: Germain Iraheta    Procedure(s):  RIGHT TYMPANOPLASTY WITH CARTILAGE BACKING    Diagnosis: Perforation of right tympanic membrane [H72.91]  Conductive hearing loss of right ear with unrestricted hearing of left ear [H90.11]  Diagnosis Additional Information: No value filed.    Anesthesia Type:   General     Note:    Oropharynx: oropharynx clear of all foreign objects  Level of Consciousness: awake  Oxygen Supplementation: room air    Independent Airway: airway patency satisfactory and stable  Dentition: dentition unchanged  Vital Signs Stable: post-procedure vital signs reviewed and stable  Report to RN Given: handoff report given  Patient transferred to: PACU    Handoff Report: Identifed the Patient, Identified the Reponsible Provider, Reviewed the pertinent medical history, Discussed the surgical course, Reviewed Intra-OP anesthesia mangement and issues during anesthesia, Set expectations for post-procedure period and Allowed opportunity for questions and acknowledgement of understanding      Vitals: (Last set prior to Anesthesia Care Transfer)  CRNA VITALS  1/18/2021 1118 - 1/18/2021 1201      1/18/2021             Resp Rate (set):  10        Electronically Signed By: KRISTINA Vasquez CRNA  January 18, 2021  12:01 PM   Clindamycin Pregnancy And Lactation Text: This medication can be used in pregnancy if certain situations. Clindamycin is also present in breast milk.

## 2022-09-04 DIAGNOSIS — N52.9 IMPOTENCE OF ORGANIC ORIGIN: ICD-10-CM

## 2022-09-07 RX ORDER — TADALAFIL 20 MG/1
TABLET ORAL
Qty: 60 TABLET | Refills: 0 | Status: SHIPPED | OUTPATIENT
Start: 2022-09-07 | End: 2022-10-08

## 2022-10-08 DIAGNOSIS — N52.9 IMPOTENCE OF ORGANIC ORIGIN: ICD-10-CM

## 2022-10-08 NOTE — TELEPHONE ENCOUNTER
Pt calling needing refill.    [facility]. [place].    Needs appointment. Will schedule that, in order to get refill.    Transferred to scheduling.      Rafia Jarrell RN  [facility] Nurse Advisor  10/8/2022 at 3:21 PM

## 2022-10-10 RX ORDER — TADALAFIL 20 MG/1
TABLET ORAL
Qty: 60 TABLET | Refills: 0 | Status: SHIPPED | OUTPATIENT
Start: 2022-10-10

## 2022-11-05 ENCOUNTER — APPOINTMENT (OUTPATIENT)
Dept: CT IMAGING | Facility: CLINIC | Age: 37
End: 2022-11-05
Attending: EMERGENCY MEDICINE

## 2022-11-05 ENCOUNTER — HOSPITAL ENCOUNTER (EMERGENCY)
Facility: CLINIC | Age: 37
Discharge: HOME | End: 2022-11-05
Attending: EMERGENCY MEDICINE | Admitting: EMERGENCY MEDICINE

## 2022-11-05 VITALS
HEART RATE: 98 BPM | WEIGHT: 195 LBS | BODY MASS INDEX: 27.3 KG/M2 | DIASTOLIC BLOOD PRESSURE: 95 MMHG | RESPIRATION RATE: 20 BRPM | TEMPERATURE: 96.7 F | HEIGHT: 71 IN | SYSTOLIC BLOOD PRESSURE: 156 MMHG | OXYGEN SATURATION: 96 %

## 2022-11-05 DIAGNOSIS — S01.112A LEFT EYELID LACERATION, INITIAL ENCOUNTER: ICD-10-CM

## 2022-11-05 DIAGNOSIS — S09.93XA BLUNT TRAUMA OF FACE, INITIAL ENCOUNTER: ICD-10-CM

## 2022-11-05 LAB — ALCOHOL BREATH TEST: 0 (ref 0–0.01)

## 2022-11-05 PROCEDURE — 99284 EMERGENCY DEPT VISIT MOD MDM: CPT | Mod: 25 | Performed by: EMERGENCY MEDICINE

## 2022-11-05 PROCEDURE — 99282 EMERGENCY DEPT VISIT SF MDM: CPT | Mod: 25 | Performed by: EMERGENCY MEDICINE

## 2022-11-05 PROCEDURE — 12011 RPR F/E/E/N/L/M 2.5 CM/<: CPT | Performed by: EMERGENCY MEDICINE

## 2022-11-05 PROCEDURE — 70486 CT MAXILLOFACIAL W/O DYE: CPT

## 2022-11-05 PROCEDURE — 250N000009 HC RX 250: Performed by: EMERGENCY MEDICINE

## 2022-11-05 PROCEDURE — 250N000013 HC RX MED GY IP 250 OP 250 PS 637: Performed by: EMERGENCY MEDICINE

## 2022-11-05 RX ORDER — ACETAMINOPHEN 325 MG/1
650 TABLET ORAL EVERY 4 HOURS PRN
Status: DISCONTINUED | OUTPATIENT
Start: 2022-11-05 | End: 2022-11-05 | Stop reason: HOSPADM

## 2022-11-05 RX ORDER — METHYLCELLULOSE 4000CPS 30 %
POWDER (GRAM) MISCELLANEOUS ONCE
Status: DISCONTINUED | OUTPATIENT
Start: 2022-11-05 | End: 2022-11-05 | Stop reason: HOSPADM

## 2022-11-05 RX ORDER — IBUPROFEN 400 MG/1
400 TABLET, FILM COATED ORAL ONCE
Status: COMPLETED | OUTPATIENT
Start: 2022-11-05 | End: 2022-11-05

## 2022-11-05 RX ADMIN — IBUPROFEN 400 MG: 400 TABLET ORAL at 08:51

## 2022-11-05 RX ADMIN — Medication 3 ML: at 08:40

## 2022-11-05 RX ADMIN — ACETAMINOPHEN 650 MG: 325 TABLET, FILM COATED ORAL at 08:52

## 2022-11-05 ASSESSMENT — ENCOUNTER SYMPTOMS
ALLERGIC/IMMUNOLOGIC NEGATIVE: 1
WOUND: 1
ENDOCRINE NEGATIVE: 1
CARDIOVASCULAR NEGATIVE: 1
RESPIRATORY NEGATIVE: 1
HEMATOLOGIC/LYMPHATIC NEGATIVE: 1
GASTROINTESTINAL NEGATIVE: 1
PSYCHIATRIC NEGATIVE: 1
NEUROLOGICAL NEGATIVE: 1
MUSCULOSKELETAL NEGATIVE: 1
EYES NEGATIVE: 1

## 2022-11-05 ASSESSMENT — ACTIVITIES OF DAILY LIVING (ADL): ADLS_ACUITY_SCORE: 35

## 2022-11-05 NOTE — ED PROVIDER NOTES
History     Chief Complaint   Patient presents with     Facial Injury     Mental Health Problem     Suicidal thoughts-looking for help/treatment     HPI  Germain Iraheta is a 36 year old male who presents for evaluation after reported facial injury.  On intake patient reports that he went out for a walk in a dark because he could not sleep.  He reports that he had cut 3 yard tripped and fell injuring his face.  Patient was dropped off by a friend.    Medical records show prior history of restless leg syndrome, hypertension, history of disorder with maintaining or initiating sleep.    On examination patient reports he takes no active medications.  Patient reports he had a few beers to drink last night.  Patient reports he has trouble sleeping and he could not sleep last time when to go for a walk.  He reports he lives in [place].  He try to cut through the trailer cord reports he did not want to be perceived as a perpetrator looking at other peoples homes and he turned and reports he tripped and fell but not sure what he tripped against.  He sustained a laceration over his lower eyelid and blunt trauma about the nose and presents for further care.  He reports no headache.  He also reports no epistaxis.  No dental pain or jaw pain.  No chest pain or shortness of breath and no other injuries.    Allergies:  Allergies   Allergen Reactions     Ceclor [Cefaclor Monohydrate] Rash     Erythromycin Rash     Lorabid [Loracarbef] Rash       Problem List:    Patient Active Problem List    Diagnosis Date Noted     Essential hypertension 10/01/2021     Priority: Medium     Persistent disorder of initiating or maintaining sleep 10/01/2021     Priority: Medium     Bilateral leg cramps 10/01/2021     Priority: Medium     Nocturnal leg cramps 10/01/2021     Priority: Medium     Perforation of right tympanic membrane 11/20/2020     Priority: Medium     Added automatically from request for surgery [id number]       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 11/20/2020     Priority: Medium     Added automatically from request for surgery [id number]       Restless leg syndrome 04/19/2017     Priority: Medium     Impotence of organic origin 06/05/2013     Priority: Medium     CARDIOVASCULAR SCREENING; LDL GOAL LESS THAN 160 06/04/2013     Priority: Medium     Hemorrhoid thrombosis 10/04/2010     Priority: Medium        Past Medical History:    Past Medical History:   Diagnosis Date     ED (erectile dysfunction)      Hypertension      Insomnia      RLS (restless legs syndrome)      Tobacco use disorder        Past Surgical History:    Past Surgical History:   Procedure Laterality Date     PE TUBES       TYMPANOPLASTY Right 1/18/2021    Procedure: RIGHT TYMPANOPLASTY WITH CARTILAGE BACKING;  Surgeon: Sandra Peña MD;  [place] [facility] [place]       Family History:    Family History   Problem Relation Age of Onset     Diabetes Father      Cancer Maternal Grandfather         lung     Hypertension No family hx of        Social History:  Marital Status:  Single [1]  Social History     Tobacco Use     Smoking status: Every Day     Packs/day: 0.50     Years: 3.00     Pack years: 1.50     Types: Cigarettes     Smokeless tobacco: Never   Vaping Use     Vaping Use: Never used   Substance Use Topics     Alcohol use: Yes     Alcohol/week: 0.0 standard drinks     Comment: 5 drinks a month- weekend use     Drug use: Not Currently     Types: Marijuana, Methamphetamines     Comment: past use        Medications:    albuterol (PROAIR HFA/PROVENTIL HFA/VENTOLIN HFA) 108 (90 Base) MCG/ACT inhaler  amLODIPine (NORVASC) 2.5 MG tablet  chlorthalidone (HYGROTON) 25 MG tablet  gabapentin (NEURONTIN) 800 MG tablet  ibuprofen (ADVIL/MOTRIN) 200 MG tablet  tadalafil (CIALIS) 20 MG tablet  traZODone (DESYREL) 100 MG tablet          Review of Systems   Constitutional:        Trip and fall while going for a walk in the dark.  Fell on my face.  "  HENT: Negative.    Eyes: Negative.    Respiratory: Negative.    Cardiovascular: Negative.    Gastrointestinal: Negative.    Endocrine: Negative.    Genitourinary: Negative.    Musculoskeletal: Negative.    Skin: Positive for wound. Rash: left lower eye lid laceration.   Allergic/Immunologic: Negative.    Neurological: Negative.    Hematological: Negative.    Psychiatric/Behavioral: Negative.    All other systems reviewed and are negative.      Physical Exam   BP: (!) 156/95  Pulse: 98  Temp: (!) 96.7  F (35.9  C)  Resp: 20  Height: 180.3 cm (5' 11\")  Weight: 88.5 kg (195 lb)  SpO2: 96 %      Physical Exam  Constitutional:       Appearance: He is not toxic-appearing or diaphoretic.   HENT:      Head: Normocephalic.      Nose:        Mouth/Throat:      Mouth: Mucous membranes are moist.   Eyes:      Extraocular Movements: Extraocular movements intact.      Pupils: Pupils are equal, round, and reactive to light.     Neck:      Vascular: No carotid bruit.   Cardiovascular:      Rate and Rhythm: Normal rate and regular rhythm.   Pulmonary:      Effort: Pulmonary effort is normal.   Musculoskeletal:         General: No swelling, tenderness, deformity or signs of injury.      Cervical back: Normal range of motion and neck supple. No rigidity or tenderness.      Right lower leg: No edema.      Left lower leg: No edema.   Lymphadenopathy:      Cervical: No cervical adenopathy.   Skin:     Capillary Refill: Capillary refill takes less than 2 seconds.      Coloration: Skin is not jaundiced or pale.      Findings: No bruising, erythema, lesion or rash.   Neurological:      General: No focal deficit present.      Mental Status: He is alert and oriented to person, place, and time.      Sensory: No sensory deficit.      Coordination: Coordination normal.      Deep Tendon Reflexes: Reflexes normal.   Psychiatric:         Mood and Affect: Mood normal.         Behavior: Behavior normal.         Thought Content: Thought content " normal.         Judgment: Judgment normal.                           Post- Repair            ED Course     Mental Health Risk Assessment      PSS-3    Date and Time Over the past 2 weeks have you felt down, depressed, or hopeless? Over the past 2 weeks have you had thoughts of killing yourself? Have you ever attempted to kill yourself? When did this last happen? User   11/05/22 0744 yes yes yes more than 6 months ago LILA      C-SSRS ([place])    Date and Time Q1 Wished to be Dead (Past Month) Q2 Suicidal Thoughts (Past Month) Q3 Suicidal Thought Method Q4 Suicidal Intent without Specific Plan Q5 Suicide Intent with Specific Plan Q6 Suicide Behavior (Lifetime) Within the Past 3 Months? RETIRED: Level of Risk per Screen Screening Not Complete User   11/05/22 0744 yes yes no yes no yes -- -- -- LZ                Item Assessment   Suicidal Ideation not active thoughts on evaluation   Plan none   Intent none   Suicidal or self-harm behaviors none reported on evaluation   Risk Factors Insomnia   Protective Factors homed, insight to thoughts and triggers              [facility]    -Laceration Repair    Date/Time: 11/5/2022 10:53 AM  Performed by: Jabier Mendiola MD  Authorized by: Jabier Mendiola MD     Risks, benefits and alternatives discussed.      ANESTHESIA (see MAR for exact dosages):     Anesthesia method:  Topical application  LACERATION DETAILS     Location:  Face    Face location:  L lower eyelid    Extent:  Partial thickness    Length (cm):  1.5    Depth (mm):  7    REPAIR TYPE:     Repair type:  Simple      EXPLORATION:     Hemostasis achieved with:  LET    Wound exploration: wound explored through full range of motion and entire depth of wound probed and visualized      Contaminated: no      TREATMENT:     Area cleansed with:  Saline    Amount of cleaning:  Standard    Irrigation solution:  Sterile saline    SKIN REPAIR     Repair method:  Sutures    Suture size:   "6-0    Suture technique:  Simple interrupted    Number of sutures:  3    APPROXIMATION     Approximation:  Close    POST-PROCEDURE DETAILS     Dressing:  Antibiotic ointment        PROCEDURE    Patient Tolerance:  Patient tolerated the procedure well with no immediate complications              Critical Care time:  none           ED medications:  Medications   lido-EPINEPHrine-tetracaine (LET) topical gel GEL (3 mLs Topical Given by Other 11/5/22 0840)   ibuprofen (ADVIL/MOTRIN) tablet 400 mg (400 mg Oral Given 11/5/22 0851)       ED vitals:  Vitals:    11/05/22 0743   BP: (!) 156/95   Pulse: 98   Resp: 20   Temp: (!) 96.7  F (35.9  C)   TempSrc: Tympanic   SpO2: 96%   Weight: 88.5 kg (195 lb)   Height: 1.803 m (5' 11\")     ED labs and imaging:  Results for orders placed or performed during the hospital encounter of 11/05/22   CT Facial Bones without Contrast     Status: None    Narrative    EXAM: CT FACIAL BONES WITHOUT CONTRAST  LOCATION: [facility]  DATE/TIME: 11/05/2022, 9:38 AM    INDICATION: Blunt facial trauma. Left eyelid laceration. Periorbital ecchymosis.  Evaluate for acute bony process after blunt trauma.  COMPARISON: None.  TECHNIQUE: Routine CT Maxillofacial without IV contrast. Multiplanar reformats. Dose reduction techniques were used.     FINDINGS:  OSSEOUS STRUCTURES/SOFT TISSUES: There is soft tissue swelling of the left face from the left forehead down to the left cheek consistent with recent trauma. No facial bone fracture or malalignment. No evidence for dental trauma or periapical abscess.    ORBITAL CONTENTS: No acute abnormality.    SINUSES: There is scattered mucosal thickening in the ethmoid air cells bilaterally, sphenoid sinus and maxillary sinuses bilaterally. No evidence for acute sinusitis.    VISUALIZED INTRACRANIAL CONTENTS: No acute abnormality.       Impression    IMPRESSION:   1.  Soft tissue swelling of the left face from the left forehead down to the " left cheek consistent with recent trauma.  2.  No fractures.  3.  No evidence for orbital pathology.     Alcohol Breath Test     Status: Normal   Result Value Ref Range    Alcohol Breath Test 0 0.00 - 0.01         Assessments & Plan (with Medical Decision Making)   Assessment Summary and Clinical Impression: 36-year-old male presented with passive suicidal ideation-captured on intake assessment with facial injury after reporting a mechanical fall while going for a walk in the dark because he could not sleep.  He sustained a left lower eyelid laceration with contusion over the bridge of the nose and blunt facial trauma.  He had some periorbital ecchymosis.  Patient has some infraorbital ecchymosis.  Preserved extraocular eye muscle movements no entrapment appreciated in reported no headache.  GCS was 15 no neck pain no other injuries.  See photo images captured after informed verbal consent from the patient for location of lower eyelid laceration.  With 1.5 cm lower eyelid laceration although does not appear to involve the canthus or be a through and through laceration I reviewed presentation with ophthalmology on-call who supported decision to close the eyelid laceration in the emergency department.  See photo image and  procedure note for additional details of repair.    ED course and plan:  Reviewed the medical record.  Patient had some discomfort, improved with topical LET applied to the lower eyelid laceration.  Patient admitted to drinking alcohol last night and his breathalyzer was 0.  With location of eyelid laceration I reviewed repair with ophthalmology and eye exam on arrival.  I elected to obtain a CT of his face given blunt trauma to exclude any facial bone fractures.  Spoke with Dr. CLAUDIA Lopez- ophthalmology on-call at 8.48AM-it was reasonable for me to close the eyelid laceration given the lid margin is not violated with plan to have suture removal and follow-up in 1 week.  We discussed options for  suture closure including absorbable versus nonabsorbable.  Dr Lopez advised that it is a 50-50 split with oculoplastics and that suture preference would be up to me.  I elected to place 6.0 Ethilon suture x 3 stitches after the wound was irrigated copiously and exploration of the wound depth.  There was no through and through laceration.  Topical antibiotic ointment was applied to the wound after wound edges were well approximated.  See photo images obtained after suture repair after informed verbal consent from the patient.  CT of the face revealed no acute orbital or facial bone fracture.  See details in the radiology report  Patient is discharged home with lower eyelid laceration after wound care instructions were provided including signs of suggest a wound infection.  He is advised to call the total eye care clinic at the hospital next week for suture removal in 7 days.  Apply topical antibiotic ointment 3 times a day.  Patient reported he was not suicidal and did not want to talk about his passive suicidal ideations that been reported on intake assessment at the time of arrival.  We discussed that if he has any concerns about his thoughts he may benefit from outpatient therapy and follow-up with his care team or return to the department to be re-evaluated.    Disclaimer: This note consists of symbols derived from keyboarding, dictation and/or voice recognition software. As a result, there may be errors in the script that have gone undetected. Please consider this when interpreting information found in this chart.        I have reviewed the nursing notes.    I have reviewed the findings, diagnosis, plan and need for follow up with the patient.       Discharge Medication List as of 11/5/2022 10:54 AM          Final diagnoses:   Left eyelid laceration, initial encounter   Blunt trauma of face, initial encounter       11/5/2022   [facility] EMERGENCY DEPT     Jabier Mendiola MD  11/05/22  [id number]

## 2022-11-05 NOTE — DISCHARGE INSTRUCTIONS
1) Your eyelid laceration was repaired today with 3 stitches.  He should have the sutures removed in the eye clinic in 1 week.  Apply topical bike ointment as advised 3 times a day.  Be sure to keep the wound dry and clean.  You can express bruising and swelling over the next few days.  CT imaging did not reveal any bony fracture about her face nose or eye.    2) consider seeking therapy if you have any concerns about your mental health is reported you do not currently have any suicidal thoughts or homicidal thoughts.  Follow-up referral, provided by her primary care clinic

## 2022-11-05 NOTE — ED NOTES
Pt states that he went for a walk in the dark because he couldn't sleep. Pt states that he was cutting thru a yard, turned and tripped over something and fell. No LOC. Pt has scratch/cut to L eye area. No bleeding noted. Pt keeps eyes closed. Pt was dropped off here this AM by a friend.

## 2022-11-05 NOTE — ED TRIAGE NOTES
Pt presents to ED after facial injury to left eye. Pt states went for a walk overnight and ran into something and injured face. Laceration noted to nose and left eye. Pain to nose and left eye.      Triage Assessment     Row Name 11/05/22 0741       Triage Assessment (Adult)    Airway WDL WDL       Respiratory WDL    Respiratory WDL WDL       Skin Circulation/Temperature WDL    Skin Circulation/Temperature WDL WDL       Cardiac WDL    Cardiac WDL WDL       Peripheral/Neurovascular WDL    Peripheral Neurovascular WDL WDL       Cognitive/Neuro/Behavioral WDL    Cognitive/Neuro/Behavioral WDL WDL

## 2022-11-07 ENCOUNTER — PATIENT OUTREACH (OUTPATIENT)
Dept: FAMILY MEDICINE | Facility: CLINIC | Age: 37
End: 2022-11-07

## 2022-11-07 ENCOUNTER — TELEPHONE (OUTPATIENT)
Dept: FAMILY MEDICINE | Facility: CLINIC | Age: 37
End: 2022-11-07

## 2022-11-07 DIAGNOSIS — N52.9 IMPOTENCE OF ORGANIC ORIGIN: ICD-10-CM

## 2022-11-07 RX ORDER — TADALAFIL 20 MG/1
TABLET ORAL
Qty: 60 TABLET | Refills: 0 | OUTPATIENT
Start: 2022-11-07

## 2022-11-07 NOTE — TELEPHONE ENCOUNTER
Sorry will not refill, I have not seen this patient for this concern and haven't seen in office for well over one year.  Lashay Yin, CFNP

## 2022-11-07 NOTE — TELEPHONE ENCOUNTER
What type of discharge? Emergency Department  Risk of Hospital admission or ED visit: 50%  Is a TCM episode required? No  When should the patient follow up with PCP? 7 days of discharge.    Yrn Pierre RN  [facility]

## 2022-11-07 NOTE — TELEPHONE ENCOUNTER
Writer left message for patient to return call to clinic.    First attempt.    Morenita GEORGES

## 2022-11-07 NOTE — TELEPHONE ENCOUNTER
Routed back to [facility].    Please provider Humphrey's note.    Yrn GEORGES [facility]

## 2022-11-07 NOTE — TELEPHONE ENCOUNTER
Germain has filled #180 tablets since 8/4/22 and is requesting a refill today after filling #60 tabs 10/10/22.    Pharmacy will begin limiting his fills since the max daily dose is 20mg.  If this order is approved it will not be filled until December.    Please advise if you would like any further restrictions.      Kit Negro, Pharm D  [facility]  [phone]

## 2022-11-12 DIAGNOSIS — N52.9 IMPOTENCE OF ORGANIC ORIGIN: ICD-10-CM

## 2022-11-14 NOTE — TELEPHONE ENCOUNTER
Passes [facility] refill protocol.  Within last month we got notificiation that patient was having too many refills of this medication.

## 2022-11-15 RX ORDER — TADALAFIL 20 MG/1
TABLET ORAL
Qty: 60 TABLET | Refills: 0 | OUTPATIENT
Start: 2022-11-15

## 2022-11-16 ENCOUNTER — VIRTUAL VISIT (OUTPATIENT)
Dept: FAMILY MEDICINE | Facility: CLINIC | Age: 37
End: 2022-11-16

## 2022-11-16 DIAGNOSIS — N52.9 IMPOTENCE OF ORGANIC ORIGIN: ICD-10-CM

## 2022-11-16 DIAGNOSIS — Z71.89 COUNSELING AND COORDINATION OF CARE: Primary | ICD-10-CM

## 2022-11-16 PROCEDURE — 99213 OFFICE O/P EST LOW 20 MIN: CPT | Mod: 95 | Performed by: NURSE PRACTITIONER

## 2022-11-16 RX ORDER — TRAZODONE HYDROCHLORIDE 100 MG/1
200 TABLET ORAL AT BEDTIME
Qty: 180 TABLET | Refills: 3 | Status: CANCELLED | OUTPATIENT
Start: 2022-11-16

## 2022-11-16 RX ORDER — GABAPENTIN 800 MG/1
800 TABLET ORAL AT BEDTIME
Qty: 90 TABLET | Refills: 0 | Status: CANCELLED | OUTPATIENT
Start: 2022-11-16

## 2022-11-16 RX ORDER — TADALAFIL 20 MG/1
TABLET ORAL
Qty: 60 TABLET | Refills: 0 | Status: CANCELLED | OUTPATIENT
Start: 2022-11-16

## 2022-11-16 RX ORDER — TADALAFIL 20 MG/1
20 TABLET ORAL DAILY PRN
Qty: 30 TABLET | Refills: 0 | Status: SHIPPED | OUTPATIENT
Start: 2022-11-16

## 2022-11-16 NOTE — PROGRESS NOTES
"Pt found on \"At Risk Patients Not Enrolled in Care Coordination\" report. Filtered to include pt's with no insurance coverage listed.     No insurance listed.     VV today - stated pt needs an OV for further refills. No future appt scheduled as of yet.     Care Coordination order entered.  "

## 2022-11-16 NOTE — PROGRESS NOTES
"Khurram is a 36 year old who is being evaluated via a billable video visit.      How would you like to obtain your AVS? MyChart  If the video visit is dropped, the invitation should be resent by: Text to cell phone: [phone]  Will anyone else be joining your video visit? No        Assessment & Plan     Impotence of organic origin    - tadalafil (CIALIS) 20 MG tablet; Take 1 tablet (20 mg) by mouth daily as needed (erectile dysfuntion)             Nicotine/Tobacco Cessation:  He reports that he has been smoking cigarettes. He has a 1.50 pack-year smoking history. He has never used smokeless tobacco.  Nicotine/Tobacco Cessation Plan:         BMI:   Estimated body mass index is 27.2 kg/m  as calculated from the following:    Height as of 11/5/22: 1.803 m (5' 11\").    Weight as of 11/5/22: 88.5 kg (195 lb).       See Patient Instructions  Patient Instructions   30 day supply written.  NEED yearly office visit, physical, check up and lab work before any further refills.      Our Clinic hours are:  Mondays    7:20 am - 7 pm  Tues -  Fri  7:20 am - 5 pm    Clinic Phone: [phone]    The clinic lab opens at 7:30 am Mon - Fri and appointments are required.    [facility]  Ph. [phone]  Monday  8 am - 7pm  Tues - Fri 8 am - 5:30 pm           Return in about 2 weeks (around 11/30/2022) for or sooner if symptoms persist or worsen, Physical Exam, Lab Work, Med Check.    KRISTINA Jones [facility]    Rosa Isela Paulson is a 36 year old, presenting for the following health issues:  Refill Request      HPI     Medication Followup of Gabapentin and Cialis    Taking Medication as prescribed: yes    Side Effects:  None    Medication Helping Symptoms:  yes        Review of Systems   Constitutional, HEENT, cardiovascular, pulmonary, gi and gu systems are negative, except as otherwise noted.      Objective           Vitals:  No vitals were obtained today due to virtual " visit.    Physical Exam   GENERAL: Healthy, alert and no distress  EYES: Eyes grossly normal to inspection.  No discharge or erythema, or obvious scleral/conjunctival abnormalities.  RESP: No audible wheeze, cough, or visible cyanosis.  No visible retractions or increased work of breathing.    SKIN: Visible skin clear. No significant rash, abnormal pigmentation or lesions.  NEURO: Cranial nerves grossly intact.  Mentation and speech appropriate for age.  PSYCH: Mentation appears normal, affect normal/bright, judgement and insight intact, normal speech and appearance well-groomed.                Video-Visit Details    Video Start Time: 8:55    Type of service:  Video Visit    Video End Time:9:02 AM    Originating Location (pt. Location): Home    Distant Location (provider location):  Off-site    Platform used for Video Visit: Milagro

## 2022-11-16 NOTE — PATIENT INSTRUCTIONS
30 day supply written.  NEED yearly office visit, physical, check up and lab work before any further refills.      Our Clinic hours are:  Mondays    7:20 am - 7 pm  Tues -  Fri  7:20 am - 5 pm    Clinic Phone: [phone]    The clinic lab opens at 7:30 am Mon - Fri and appointments are required.    [facility]  Ph. [phone]  Monday  8 am - 7pm  Tues - Fri 8 am - 5:30 pm

## 2022-11-17 ENCOUNTER — PATIENT OUTREACH (OUTPATIENT)
Dept: CARE COORDINATION | Facility: CLINIC | Age: 37
End: 2022-11-17

## 2022-11-17 NOTE — PROGRESS NOTES
Clinic Care Coordination Contact  [facility]/Voicemail       Clinical Data: Care Coordinator Outreach  Outreach attempted x 1.  Left message on patient's voicemail with call back information and requested return call.  Plan: Care Coordinator will try to reach patient again in 1-2 business days.    JESSICA Hernandez  [facility] Care Coordination  [facility]  [phone]

## 2022-11-18 NOTE — PROGRESS NOTES
Clinic Care Coordination Contact  Community Health Worker Initial Outreach        Patient accepts CC: No, patient stated he is not interested at this time. Patient will be sent Care Coordination introduction letter for future reference.     JESSICA Hernandez  [facility] Care Coordination  [facility]  [phone]

## 2022-11-20 ENCOUNTER — HEALTH MAINTENANCE LETTER (OUTPATIENT)
Age: 37
End: 2022-11-20

## 2023-04-15 ENCOUNTER — HEALTH MAINTENANCE LETTER (OUTPATIENT)
Age: 38
End: 2023-04-15

## 2024-06-22 ENCOUNTER — HEALTH MAINTENANCE LETTER (OUTPATIENT)
Age: 39
End: 2024-06-22

## 2025-07-12 ENCOUNTER — HEALTH MAINTENANCE LETTER (OUTPATIENT)
Age: 40
End: 2025-07-12

## (undated) DEVICE — SPONGE SURGIFOAM 100 1974

## (undated) DEVICE — PREP POVIDONE-IODINE 7.5% SCRUB 4OZ BOTTLE MDS093945

## (undated) DEVICE — SOL NACL 0.9% IRRIG 500ML BOTTLE 2F7123

## (undated) DEVICE — NDL ANGIOCATH 14GA 1.25" 4048

## (undated) DEVICE — PREP POVIDONE IODINE SOLUTION 10% 4OZ BOTTLE 29906-004

## (undated) DEVICE — SUCTION MANIFOLD NEPTUNE 2 SYS 4 PORT 0702-020-000

## (undated) DEVICE — DRAPE SPLIT SHEET 77X108 REINFORCED 29436

## (undated) DEVICE — ESU ELEC BLADE 2.75" COATED/INSULATED E1455

## (undated) DEVICE — CLOSURE SYS SKIN PREMIERPRO EXOFIN FUSION 4X22CM STRL 3472

## (undated) DEVICE — PREP SKIN SCRUB TRAY 4461A

## (undated) DEVICE — SOL WATER IRRIG 500ML BOTTLE 2F7113

## (undated) DEVICE — NIM ELEC SUBDERMAL NDL 3PAIR/BOX

## (undated) DEVICE — DRSG COTTON BALL 6PK LCB62

## (undated) DEVICE — ESU GROUND PAD ADULT W/CORD E7507

## (undated) DEVICE — WIPE INSTRUMENT MEROCEL 400200

## (undated) DEVICE — DRSG BANDAID 1X3" FABRIC CURITY LATEX FREE KC44101

## (undated) DEVICE — SYR 10ML LL W/O NDL

## (undated) DEVICE — PACK EAR CUSTOM ASC

## (undated) DEVICE — BLADE KNIFE BEAVER MINI BEAVER6400

## (undated) DEVICE — BLADE KNIFE BEAVER TYMPANOPLASTY 2.5MM W/60D DOWN 377200

## (undated) DEVICE — LINEN TOWEL PACK X5 5464

## (undated) DEVICE — SU VICRYL 3-0 X-1 27" UND J458H

## (undated) DEVICE — DRAPE MICROSCOPE LEICA 54X150" AR8033650

## (undated) DEVICE — GLOVE PROTEXIS POWDER FREE SMT 6.5  2D72PT65X

## (undated) RX ORDER — FENTANYL CITRATE 50 UG/ML
INJECTION, SOLUTION INTRAMUSCULAR; INTRAVENOUS
Status: DISPENSED
Start: 2021-01-18

## (undated) RX ORDER — HYDROMORPHONE HYDROCHLORIDE 1 MG/ML
INJECTION, SOLUTION INTRAMUSCULAR; INTRAVENOUS; SUBCUTANEOUS
Status: DISPENSED
Start: 2021-01-18

## (undated) RX ORDER — ONDANSETRON 2 MG/ML
INJECTION INTRAMUSCULAR; INTRAVENOUS
Status: DISPENSED
Start: 2021-01-18

## (undated) RX ORDER — LIDOCAINE HYDROCHLORIDE 20 MG/ML
INJECTION, SOLUTION EPIDURAL; INFILTRATION; INTRACAUDAL; PERINEURAL
Status: DISPENSED
Start: 2021-01-18

## (undated) RX ORDER — GLYCOPYRROLATE 0.2 MG/ML
INJECTION INTRAMUSCULAR; INTRAVENOUS
Status: DISPENSED
Start: 2021-01-18

## (undated) RX ORDER — REMIFENTANIL HYDROCHLORIDE 1 MG/ML
INJECTION, POWDER, LYOPHILIZED, FOR SOLUTION INTRAVENOUS
Status: DISPENSED
Start: 2021-01-18

## (undated) RX ORDER — OXYCODONE HYDROCHLORIDE 5 MG/1
TABLET ORAL
Status: DISPENSED
Start: 2021-01-18

## (undated) RX ORDER — PROPOFOL 10 MG/ML
INJECTION, EMULSION INTRAVENOUS
Status: DISPENSED
Start: 2021-01-18

## (undated) RX ORDER — DEXAMETHASONE SODIUM PHOSPHATE 10 MG/ML
INJECTION, SOLUTION INTRAMUSCULAR; INTRAVENOUS
Status: DISPENSED
Start: 2021-01-18

## (undated) RX ORDER — DEXMEDETOMIDINE HYDROCHLORIDE 4 UG/ML
INJECTION, SOLUTION INTRAVENOUS
Status: DISPENSED
Start: 2021-01-18

## (undated) RX ORDER — ACETAMINOPHEN 325 MG/1
TABLET ORAL
Status: DISPENSED
Start: 2021-01-18

## (undated) RX ORDER — GABAPENTIN 300 MG/1
CAPSULE ORAL
Status: DISPENSED
Start: 2021-01-18

## (undated) RX ORDER — EPINEPHRINE NASAL SOLUTION 1 MG/ML
SOLUTION NASAL
Status: DISPENSED
Start: 2021-01-18

## (undated) RX ORDER — CLINDAMYCIN PHOSPHATE 900 MG/50ML
INJECTION, SOLUTION INTRAVENOUS
Status: DISPENSED
Start: 2021-01-18

## (undated) RX ORDER — BACITRACIN 500 [USP'U]/G
OINTMENT OPHTHALMIC
Status: DISPENSED
Start: 2021-01-18